# Patient Record
Sex: MALE | Race: WHITE | NOT HISPANIC OR LATINO | Employment: OTHER | URBAN - METROPOLITAN AREA
[De-identification: names, ages, dates, MRNs, and addresses within clinical notes are randomized per-mention and may not be internally consistent; named-entity substitution may affect disease eponyms.]

---

## 2018-01-01 ENCOUNTER — APPOINTMENT (OUTPATIENT)
Dept: GENERAL RADIOLOGY | Facility: HOSPITAL | Age: 63
End: 2018-01-01

## 2018-01-01 ENCOUNTER — HOSPITAL ENCOUNTER (INPATIENT)
Facility: HOSPITAL | Age: 63
LOS: 3 days | Discharge: HOME | End: 2018-01-04
Attending: EMERGENCY MEDICINE | Admitting: HOSPITALIST

## 2018-01-01 ENCOUNTER — APPOINTMENT (OUTPATIENT)
Dept: CT IMAGING | Facility: HOSPITAL | Age: 63
End: 2018-01-01

## 2018-01-01 DIAGNOSIS — S06.5XAA SUBDURAL HEMATOMA (HCC): ICD-10-CM

## 2018-01-01 DIAGNOSIS — S06.0X1A CONCUSSION WITH LOSS OF CONSCIOUSNESS OF 30 MINUTES OR LESS, INITIAL ENCOUNTER: ICD-10-CM
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
## 2018-01-01 DIAGNOSIS — S02.119A FRACTURE OF OCCIPITAL BONE OF SKULL WITH LOSS OF CONSCIOUSNESS (HCC): ICD-10-CM

## 2018-01-01 DIAGNOSIS — S06.9X9A FRACTURE OF OCCIPITAL BONE OF SKULL WITH LOSS OF CONSCIOUSNESS (HCC): ICD-10-CM

## 2018-01-01 DIAGNOSIS — S06.6X1A TRAUMATIC SUBARACHNOID HEMORRHAGE WITH LOSS OF CONSCIOUSNESS OF 30 MINUTES OR LESS, INITIAL ENCOUNTER (HCC): Primary | ICD-10-CM

## 2018-01-01 DIAGNOSIS — Z74.09 IMPAIRED FUNCTIONAL MOBILITY, BALANCE, GAIT, AND ENDURANCE: ICD-10-CM

## 2018-01-01 PROBLEM — I60.9 SAH (SUBARACHNOID HEMORRHAGE): Status: ACTIVE | Noted: 2018-01-01

## 2018-01-01 PROBLEM — S09.8XXA BLUNT HEAD TRAUMA: Status: ACTIVE | Noted: 2018-01-01
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
## 2018-01-01 LAB
ALBUMIN SERPL-MCNC: 3.9 G/DL (ref 3.2–4.8)
ALBUMIN/GLOB SERPL: 1.3 G/DL (ref 1.5–2.5)
ALP SERPL-CCNC: 69 U/L (ref 25–100)
ALT SERPL W P-5'-P-CCNC: 47 U/L (ref 7–40)
ANION GAP SERPL CALCULATED.3IONS-SCNC: 9 MMOL/L (ref 3–11)
APTT PPP: <24 SECONDS (ref 24–31)
AST SERPL-CCNC: 43 U/L (ref 0–33)
BACTERIA UR QL AUTO: ABNORMAL /HPF
BASOPHILS # BLD AUTO: 0.05 10*3/MM3 (ref 0–0.2)
BASOPHILS NFR BLD AUTO: 0.2 % (ref 0–1)
BILIRUB SERPL-MCNC: 0.5 MG/DL (ref 0.3–1.2)
BILIRUB UR QL STRIP: NEGATIVE
BUN BLD-MCNC: 12 MG/DL (ref 9–23)
BUN/CREAT SERPL: 12 (ref 7–25)
CALCIUM SPEC-SCNC: 9.2 MG/DL (ref 8.7–10.4)
CHLORIDE SERPL-SCNC: 106 MMOL/L (ref 99–109)
CLARITY UR: CLEAR
CO2 SERPL-SCNC: 25 MMOL/L (ref 20–31)
COLOR UR: YELLOW
CREAT BLD-MCNC: 1 MG/DL (ref 0.6–1.3)
DEPRECATED RDW RBC AUTO: 43.4 FL (ref 37–54)
EOSINOPHIL # BLD AUTO: 0.09 10*3/MM3 (ref 0–0.3)
EOSINOPHIL NFR BLD AUTO: 0.3 % (ref 0–3)
ERYTHROCYTE [DISTWIDTH] IN BLOOD BY AUTOMATED COUNT: 12.3 % (ref 11.3–14.5)
GFR SERPL CREATININE-BSD FRML MDRD: 75 ML/MIN/1.73
GLOBULIN UR ELPH-MCNC: 3 GM/DL
GLUCOSE BLD-MCNC: 161 MG/DL (ref 70–100)
GLUCOSE BLDC GLUCOMTR-MCNC: 192 MG/DL (ref 70–130)
GLUCOSE UR STRIP-MCNC: ABNORMAL MG/DL
HBA1C MFR BLD: 5.3 % (ref 4.8–5.6)
HCT VFR BLD AUTO: 43.5 % (ref 38.9–50.9)
HGB BLD-MCNC: 15.1 G/DL (ref 13.1–17.5)
HGB UR QL STRIP.AUTO: NEGATIVE
HYALINE CASTS UR QL AUTO: ABNORMAL /LPF
IMM GRANULOCYTES # BLD: 0.15 10*3/MM3 (ref 0–0.03)
IMM GRANULOCYTES NFR BLD: 0.6 % (ref 0–0.6)
INR PPP: 1.02
KETONES UR QL STRIP: ABNORMAL
LEUKOCYTE ESTERASE UR QL STRIP.AUTO: NEGATIVE
LYMPHOCYTES # BLD AUTO: 1.3 10*3/MM3 (ref 0.6–4.8)
LYMPHOCYTES NFR BLD AUTO: 4.8 % (ref 24–44)
MCH RBC QN AUTO: 33.5 PG (ref 27–31)
MCHC RBC AUTO-ENTMCNC: 34.7 G/DL (ref 32–36)
MCV RBC AUTO: 96.5 FL (ref 80–99)
MONOCYTES # BLD AUTO: 2.46 10*3/MM3 (ref 0–1)
MONOCYTES NFR BLD AUTO: 9.1 % (ref 0–12)
NEUTROPHILS # BLD AUTO: 22.89 10*3/MM3 (ref 1.5–8.3)
NEUTROPHILS NFR BLD AUTO: 85 % (ref 41–71)
NITRITE UR QL STRIP: NEGATIVE
PH UR STRIP.AUTO: 8.5 [PH] (ref 5–8)
PLATELET # BLD AUTO: 193 10*3/MM3 (ref 150–450)
PMV BLD AUTO: 9.3 FL (ref 6–12)
POTASSIUM BLD-SCNC: 3.6 MMOL/L (ref 3.5–5.5)
PROT SERPL-MCNC: 6.9 G/DL (ref 5.7–8.2)
PROT UR QL STRIP: ABNORMAL
PROTHROMBIN TIME: 11.1 SECONDS (ref 9.6–11.5)
RBC # BLD AUTO: 4.51 10*6/MM3 (ref 4.2–5.76)
RBC # UR: ABNORMAL /HPF
REF LAB TEST METHOD: ABNORMAL
SODIUM BLD-SCNC: 140 MMOL/L (ref 132–146)
SP GR UR STRIP: 1.02 (ref 1–1.03)
SQUAMOUS #/AREA URNS HPF: ABNORMAL /HPF
UROBILINOGEN UR QL STRIP: ABNORMAL
WBC NRBC COR # BLD: 26.94 10*3/MM3 (ref 3.5–10.8)
WBC UR QL AUTO: ABNORMAL /HPF

## 2018-01-01 PROCEDURE — 25010000002 ONDANSETRON PER 1 MG: Performed by: FAMILY MEDICINE

## 2018-01-01 PROCEDURE — 25010000002 DEXAMETHASONE SOD PHOS-NACL 10-0.9 MG/50ML-% SOLUTION: Performed by: NEUROLOGICAL SURGERY

## 2018-01-01 PROCEDURE — 85730 THROMBOPLASTIN TIME PARTIAL: CPT | Performed by: PHYSICIAN ASSISTANT

## 2018-01-01 PROCEDURE — 85610 PROTHROMBIN TIME: CPT | Performed by: PHYSICIAN ASSISTANT

## 2018-01-01 PROCEDURE — 63710000001 INSULIN REGULAR HUMAN PER 5 UNITS: Performed by: INTERNAL MEDICINE

## 2018-01-01 PROCEDURE — 25010000002 PROMETHAZINE PER 50 MG: Performed by: NEUROLOGICAL SURGERY

## 2018-01-01 PROCEDURE — 83036 HEMOGLOBIN GLYCOSYLATED A1C: CPT | Performed by: INTERNAL MEDICINE

## 2018-01-01 PROCEDURE — 25010000002 ONDANSETRON PER 1 MG: Performed by: EMERGENCY MEDICINE

## 2018-01-01 PROCEDURE — G0378 HOSPITAL OBSERVATION PER HR: HCPCS

## 2018-01-01 PROCEDURE — 82962 GLUCOSE BLOOD TEST: CPT

## 2018-01-01 PROCEDURE — 80053 COMPREHEN METABOLIC PANEL: CPT | Performed by: PHYSICIAN ASSISTANT

## 2018-01-01 PROCEDURE — 25810000003 SODIUM CHLORIDE 0.9 % WITH KCL 20 MEQ 20-0.9 MEQ/L-% SOLUTION: Performed by: INTERNAL MEDICINE

## 2018-01-01 PROCEDURE — 71045 X-RAY EXAM CHEST 1 VIEW: CPT

## 2018-01-01 PROCEDURE — 81001 URINALYSIS AUTO W/SCOPE: CPT | Performed by: NEUROLOGICAL SURGERY

## 2018-01-01 PROCEDURE — 99284 EMERGENCY DEPT VISIT MOD MDM: CPT

## 2018-01-01 PROCEDURE — 72125 CT NECK SPINE W/O DYE: CPT

## 2018-01-01 PROCEDURE — 85025 COMPLETE CBC W/AUTO DIFF WBC: CPT | Performed by: PHYSICIAN ASSISTANT

## 2018-01-01 PROCEDURE — 25010000002 DEXAMETHASONE PER 1 MG: Performed by: EMERGENCY MEDICINE

## 2018-01-01 PROCEDURE — 70450 CT HEAD/BRAIN W/O DYE: CPT

## 2018-01-01 PROCEDURE — 25010000002 MORPHINE SULFATE (PF) 2 MG/ML SOLUTION: Performed by: FAMILY MEDICINE

## 2018-01-01 RX ORDER — DEXAMETHASONE IN 0.9 % SOD CHL 10 MG/50ML
10 INTRAVENOUS SOLUTION, PIGGYBACK (ML) INTRAVENOUS ONCE
Status: COMPLETED | OUTPATIENT
Start: 2018-01-01 | End: 2018-01-01

## 2018-01-01 RX ORDER — SODIUM CHLORIDE 0.9 % (FLUSH) 0.9 %
10 SYRINGE (ML) INJECTION AS NEEDED
Status: DISCONTINUED | OUTPATIENT
Start: 2018-01-01 | End: 2018-01-04 | Stop reason: HOSPADM

## 2018-01-01 RX ORDER — NALOXONE HCL 0.4 MG/ML
0.4 VIAL (ML) INJECTION
Status: DISCONTINUED | OUTPATIENT
Start: 2018-01-01 | End: 2018-01-02

## 2018-01-01 RX ORDER — VALSARTAN 160 MG/1
160 TABLET ORAL DAILY
COMMUNITY
End: 2018-02-26

## 2018-01-01 RX ORDER — ACETAMINOPHEN 325 MG/1
650 TABLET ORAL EVERY 4 HOURS PRN
Status: DISCONTINUED | OUTPATIENT
Start: 2018-01-01 | End: 2018-01-04 | Stop reason: HOSPADM

## 2018-01-01 RX ORDER — MAGNESIUM SULFATE HEPTAHYDRATE 40 MG/ML
4 INJECTION, SOLUTION INTRAVENOUS AS NEEDED
Status: DISCONTINUED | OUTPATIENT
Start: 2018-01-01 | End: 2018-01-04 | Stop reason: HOSPADM

## 2018-01-01 RX ORDER — PANTOPRAZOLE SODIUM 40 MG/10ML
40 INJECTION, POWDER, LYOPHILIZED, FOR SOLUTION INTRAVENOUS
Status: DISCONTINUED | OUTPATIENT
Start: 2018-01-02 | End: 2018-01-02

## 2018-01-01 RX ORDER — ENALAPRILAT 2.5 MG/2ML
0.62 INJECTION INTRAVENOUS EVERY 6 HOURS PRN
Status: DISCONTINUED | OUTPATIENT
Start: 2018-01-01 | End: 2018-01-04 | Stop reason: HOSPADM

## 2018-01-01 RX ORDER — ONDANSETRON 2 MG/ML
4 INJECTION INTRAMUSCULAR; INTRAVENOUS EVERY 6 HOURS PRN
Status: DISCONTINUED | OUTPATIENT
Start: 2018-01-01 | End: 2018-01-04 | Stop reason: HOSPADM

## 2018-01-01 RX ORDER — MAGNESIUM SULFATE HEPTAHYDRATE 40 MG/ML
2 INJECTION, SOLUTION INTRAVENOUS AS NEEDED
Status: DISCONTINUED | OUTPATIENT
Start: 2018-01-01 | End: 2018-01-04 | Stop reason: HOSPADM

## 2018-01-01 RX ORDER — SODIUM CHLORIDE 450 MG/100ML
100 INJECTION, SOLUTION INTRAVENOUS CONTINUOUS
Status: DISCONTINUED | OUTPATIENT
Start: 2018-01-01 | End: 2018-01-01

## 2018-01-01 RX ORDER — MORPHINE SULFATE 2 MG/ML
1 INJECTION, SOLUTION INTRAMUSCULAR; INTRAVENOUS EVERY 4 HOURS PRN
Status: DISCONTINUED | OUTPATIENT
Start: 2018-01-01 | End: 2018-01-02

## 2018-01-01 RX ORDER — ACETAMINOPHEN 500 MG
1000 TABLET ORAL ONCE
Status: COMPLETED | OUTPATIENT
Start: 2018-01-01 | End: 2018-01-01

## 2018-01-01 RX ORDER — POTASSIUM CHLORIDE 7.45 MG/ML
10 INJECTION INTRAVENOUS
Status: DISCONTINUED | OUTPATIENT
Start: 2018-01-01 | End: 2018-01-04 | Stop reason: HOSPADM

## 2018-01-01 RX ORDER — ONDANSETRON 2 MG/ML
4 INJECTION INTRAMUSCULAR; INTRAVENOUS EVERY 6 HOURS PRN
Status: DISCONTINUED | OUTPATIENT
Start: 2018-01-01 | End: 2018-01-01 | Stop reason: SDUPTHER

## 2018-01-01 RX ORDER — DEXAMETHASONE SODIUM PHOSPHATE 4 MG/ML
4 INJECTION, SOLUTION INTRA-ARTICULAR; INTRALESIONAL; INTRAMUSCULAR; INTRAVENOUS; SOFT TISSUE EVERY 6 HOURS
Status: DISCONTINUED | OUTPATIENT
Start: 2018-01-01 | End: 2018-01-01 | Stop reason: SDUPTHER

## 2018-01-01 RX ORDER — SODIUM CHLORIDE 0.9 % (FLUSH) 0.9 %
1-10 SYRINGE (ML) INJECTION AS NEEDED
Status: DISCONTINUED | OUTPATIENT
Start: 2018-01-01 | End: 2018-01-04 | Stop reason: HOSPADM

## 2018-01-01 RX ORDER — SODIUM CHLORIDE AND POTASSIUM CHLORIDE 150; 900 MG/100ML; MG/100ML
75 INJECTION, SOLUTION INTRAVENOUS CONTINUOUS
Status: DISCONTINUED | OUTPATIENT
Start: 2018-01-01 | End: 2018-01-04 | Stop reason: HOSPADM

## 2018-01-01 RX ORDER — PANTOPRAZOLE SODIUM 40 MG/10ML
40 INJECTION, POWDER, LYOPHILIZED, FOR SOLUTION INTRAVENOUS EVERY 12 HOURS SCHEDULED
Status: DISCONTINUED | OUTPATIENT
Start: 2018-01-01 | End: 2018-01-01

## 2018-01-01 RX ORDER — NICOTINE POLACRILEX 4 MG
15 LOZENGE BUCCAL
Status: DISCONTINUED | OUTPATIENT
Start: 2018-01-01 | End: 2018-01-04 | Stop reason: HOSPADM

## 2018-01-01 RX ORDER — DEXTROSE MONOHYDRATE 25 G/50ML
25 INJECTION, SOLUTION INTRAVENOUS
Status: DISCONTINUED | OUTPATIENT
Start: 2018-01-01 | End: 2018-01-04 | Stop reason: HOSPADM

## 2018-01-01 RX ORDER — VALSARTAN 160 MG/1
160 TABLET ORAL
Status: DISCONTINUED | OUTPATIENT
Start: 2018-01-01 | End: 2018-01-04 | Stop reason: HOSPADM

## 2018-01-01 RX ORDER — ONDANSETRON 2 MG/ML
4 INJECTION INTRAMUSCULAR; INTRAVENOUS ONCE
Status: COMPLETED | OUTPATIENT
Start: 2018-01-01 | End: 2018-01-01

## 2018-01-01 RX ORDER — HYDROCODONE BITARTRATE AND ACETAMINOPHEN 5; 325 MG/1; MG/1
2 TABLET ORAL EVERY 6 HOURS PRN
Status: DISCONTINUED | OUTPATIENT
Start: 2018-01-01 | End: 2018-01-02

## 2018-01-01 RX ORDER — DEXAMETHASONE SODIUM PHOSPHATE 4 MG/ML
4 INJECTION, SOLUTION INTRA-ARTICULAR; INTRALESIONAL; INTRAMUSCULAR; INTRAVENOUS; SOFT TISSUE EVERY 6 HOURS
Status: DISCONTINUED | OUTPATIENT
Start: 2018-01-01 | End: 2018-01-04 | Stop reason: HOSPADM

## 2018-01-01 RX ORDER — ACETAMINOPHEN 500 MG
1000 TABLET ORAL 3 TIMES DAILY
Status: DISCONTINUED | OUTPATIENT
Start: 2018-01-01 | End: 2018-01-04 | Stop reason: HOSPADM

## 2018-01-01 RX ORDER — OXYCODONE HYDROCHLORIDE AND ACETAMINOPHEN 5; 325 MG/1; MG/1
1 TABLET ORAL EVERY 4 HOURS PRN
Status: DISCONTINUED | OUTPATIENT
Start: 2018-01-01 | End: 2018-01-02

## 2018-01-01 RX ORDER — PROMETHAZINE HYDROCHLORIDE 25 MG/ML
12.5 INJECTION, SOLUTION INTRAMUSCULAR; INTRAVENOUS EVERY 4 HOURS PRN
Status: DISCONTINUED | OUTPATIENT
Start: 2018-01-01 | End: 2018-01-04 | Stop reason: HOSPADM

## 2018-01-01 RX ADMIN — HYDROCODONE BITARTRATE AND ACETAMINOPHEN 2 TABLET: 5; 325 TABLET ORAL at 14:52

## 2018-01-01 RX ADMIN — NICARDIPINE HYDROCHLORIDE 2.5 MG/HR: 25 INJECTION INTRAVENOUS at 21:41

## 2018-01-01 RX ADMIN — POTASSIUM CHLORIDE AND SODIUM CHLORIDE 75 ML/HR: 900; 150 INJECTION, SOLUTION INTRAVENOUS at 16:18

## 2018-01-01 RX ADMIN — OXYCODONE AND ACETAMINOPHEN 1 TABLET: 5; 325 TABLET ORAL at 20:26

## 2018-01-01 RX ADMIN — INSULIN HUMAN 2 UNITS: 100 INJECTION, SOLUTION PARENTERAL at 23:50

## 2018-01-01 RX ADMIN — DEXAMETHASONE SODIUM PHOSPHATE 4 MG: 4 INJECTION, SOLUTION INTRAMUSCULAR; INTRAVENOUS at 20:26

## 2018-01-01 RX ADMIN — ONDANSETRON 4 MG: 2 INJECTION INTRAMUSCULAR; INTRAVENOUS at 13:08

## 2018-01-01 RX ADMIN — Medication 10 MG: at 15:18

## 2018-01-01 RX ADMIN — ACETAMINOPHEN 1000 MG: 500 TABLET ORAL at 20:26

## 2018-01-01 RX ADMIN — HYDROCODONE BITARTRATE AND ACETAMINOPHEN 2 TABLET: 5; 325 TABLET ORAL at 23:50

## 2018-01-01 RX ADMIN — ACETAMINOPHEN 1000 MG: 500 TABLET ORAL at 12:50

## 2018-01-01 RX ADMIN — INSULIN HUMAN 2 UNITS: 100 INJECTION, SOLUTION PARENTERAL at 15:18

## 2018-01-01 RX ADMIN — PROMETHAZINE HYDROCHLORIDE 12.5 MG: 25 INJECTION INTRAMUSCULAR; INTRAVENOUS at 23:55

## 2018-01-01 RX ADMIN — MORPHINE SULFATE 1 MG: 25 INJECTION, SOLUTION, CONCENTRATE INTRAVENOUS at 16:25

## 2018-01-01 RX ADMIN — MORPHINE SULFATE 1 MG: 25 INJECTION, SOLUTION, CONCENTRATE INTRAVENOUS at 21:44

## 2018-01-01 NOTE — PLAN OF CARE
Problem: Pain, Acute (Adult)  Goal: Identify Related Risk Factors and Signs and Symptoms  Outcome: Ongoing (interventions implemented as appropriate)    Goal: Acceptable Pain Control/Comfort Level  Outcome: Ongoing (interventions implemented as appropriate)

## 2018-01-01 NOTE — H&P
"[unfilled]    Patient Care Team:  No Known Provider as PCP - General  No Known Provider as PCP - Family Medicine  [phone]      Chief complaint:  Headache    History of present illness:    This is a 62-year-old male who presented to the emergency department after falling and striking his occiput with the onset of headache, confusion, short-term memory loss and nausea and vomiting.    CT scan was performed and shows the presence of a fracture at the level of the occipital protuberance associated with traumatic subarachnoid hemorrhage it both frontal poles.  There is a small parafalcine subdural hematoma on the left extension along the frontal pole.  No other abnormalities are noted.      Past Medical History:   Diagnosis Date   • GI bleed    • Hypertension    • Ulcer        No Known Allergies      Current Facility-Administered Medications:   •  Insert peripheral IV, , , Once **AND** sodium chloride 0.9 % flush 10 mL, 10 mL, Intravenous, PRN, SUMA Lofton    Current Outpatient Prescriptions:   •  valsartan (DIOVAN) 160 MG tablet, Take 160 mg by mouth Daily., Disp: , Rfl:     Social History     Social History   • Marital status:      Spouse name: N/A   • Number of children: N/A   • Years of education: N/A     Social History Main Topics   • Smoking status: Never Smoker   • Smokeless tobacco: None   • Alcohol use No   • Drug use: No   • Sexual activity: Not Asked     Other Topics Concern   • None     Social History Narrative   • None       History reviewed. No pertinent family history.    Review of Systems  Neurological ROS: negative    Vital Signs  Temp:  [97.5 °F (36.4 °C)] 97.5 °F (36.4 °C)  Heart Rate:  [] 102  Resp:  [16-18] 16  BP: (138-162)/() 153/98  Body mass index is 27.32 kg/(m^2).  No intake or output data in the 24 hours ending 01/01/18 1324  Flowsheet Rows         First Filed Value    Admission Height  175.3 cm (69\") Documented at 01/01/2018 1054    Admission Weight  83.9 kg " (185 lb) Documented at 01/01/2018 1054           Physical Exam:    General Appearance:    Alert, cooperative, in no acute distress   Head:    Has ecchymosis around his left periorbital region.  Patient's scalp incision posteriorly.  Normocephalic, without obvious abnormality, atraumatic   Eyes:            Lids and lashes normal, conjunctivae and sclerae normal, no   icterus, no pallor, corneas clear, PERRLA   Ears:    Ears appear intact with no abnormalities noted   Throat:   No oral lesions, no thrush, oral mucosa moist   Neck:   No adenopathy, supple, trachea midline, no thyromegaly, no   carotid bruit, no JVD   Back:     No kyphosis present, no scoliosis present, no skin lesions,      erythema or scars, no tenderness to percussion or                   palpation,   range of motion normal   Lungs:     Clear to auscultation,respirations regular, even and                  unlabored    Heart:    Regular rhythm and normal rate, normal S1 and S2, no            murmur, no gallop, no rub, no click   Chest Wall:    No abnormalities observed   Abdomen:     Normal bowel sounds, no masses, no organomegaly, soft        non-tender, non-distended, no guarding, no rebound                tenderness   Rectal:     Deferred   Extremities:   Moves all extremities well, no edema, no cyanosis, no             redness   Pulses:   Pulses palpable and equal bilaterally   Skin:   No bleeding, bruising or rash   Lymph nodes:   No palpable adenopathy   Neurologic:   Cranial nerves 2 - 12 grossly intact, sensation intact, DTR       present and equal bilaterally       Results Review:  WBC   Date Value Ref Range Status   01/01/2018 26.94 (H) 3.50 - 10.80 10*3/mm3 Final     RBC   Date Value Ref Range Status   01/01/2018 4.51 4.20 - 5.76 10*6/mm3 Final     Hemoglobin   Date Value Ref Range Status   01/01/2018 15.1 13.1 - 17.5 g/dL Final     Hematocrit   Date Value Ref Range Status   01/01/2018 43.5 38.9 - 50.9 % Final     MCV   Date Value Ref Range  Status   01/01/2018 96.5 80.0 - 99.0 fL Final     MCH   Date Value Ref Range Status   01/01/2018 33.5 (H) 27.0 - 31.0 pg Final     MCHC   Date Value Ref Range Status   01/01/2018 34.7 32.0 - 36.0 g/dL Final     RDW   Date Value Ref Range Status   01/01/2018 12.3 11.3 - 14.5 % Final     RDW-SD   Date Value Ref Range Status   01/01/2018 43.4 37.0 - 54.0 fl Final     MPV   Date Value Ref Range Status   01/01/2018 9.3 6.0 - 12.0 fL Final     Platelets   Date Value Ref Range Status   01/01/2018 193 150 - 450 10*3/mm3 Final     Neutrophil %   Date Value Ref Range Status   01/01/2018 85.0 (H) 41.0 - 71.0 % Final     Lymphocyte %   Date Value Ref Range Status   01/01/2018 4.8 (L) 24.0 - 44.0 % Final     Monocyte %   Date Value Ref Range Status   01/01/2018 9.1 0.0 - 12.0 % Final     Eosinophil %   Date Value Ref Range Status   01/01/2018 0.3 0.0 - 3.0 % Final     Basophil %   Date Value Ref Range Status   01/01/2018 0.2 0.0 - 1.0 % Final     Immature Grans %   Date Value Ref Range Status   01/01/2018 0.6 0.0 - 0.6 % Final     Neutrophils, Absolute   Date Value Ref Range Status   01/01/2018 22.89 (H) 1.50 - 8.30 10*3/mm3 Final     Lymphocytes, Absolute   Date Value Ref Range Status   01/01/2018 1.30 0.60 - 4.80 10*3/mm3 Final     Monocytes, Absolute   Date Value Ref Range Status   01/01/2018 2.46 (H) 0.00 - 1.00 10*3/mm3 Final     Eosinophils, Absolute   Date Value Ref Range Status   01/01/2018 0.09 0.00 - 0.30 10*3/mm3 Final     Basophils, Absolute   Date Value Ref Range Status   01/01/2018 0.05 0.00 - 0.20 10*3/mm3 Final     Immature Grans, Absolute   Date Value Ref Range Status   01/01/2018 0.15 (H) 0.00 - 0.03 10*3/mm3 Final         Patient Active Problem List   Diagnosis Code   • Closed fracture of occipital bone S02.119A   • SDH (subdural hematoma) I62.00   • SAH (subarachnoid hemorrhage) I60.9   • Fall due to ice or snow W00.9XXA   • Blunt head trauma due to fall S09.8XXA   • Traumatic subarachnoid hemorrhage with  loss of consciousness of 30 minutes or less S06.6X1A         Assessment/Plan   1.  Closed head injury   2.  Traumatic subarachnoid hemorrhage.  3.  Subdural hematoma, parafalcine and left frontal pole      He requires intensive care observation over the next 24-46 6 hours.  The concern will be development of cerebral edema and swelling secondary to the traumatic event.          , 01/01/18         1:24 PM

## 2018-01-01 NOTE — ED PROVIDER NOTES
Subjective   HPI Comments: 62-year-old male presents to the emergency department for evaluation after a fall.  The patient reportedly was walking his dog and slipped on ice and fell and hit the back of his head on concrete.  There was positive loss of consciousness.  The neighbors found him confused on the side of the road.  The patient has no recall of the fall or other morning events.  He continually ask what happened.  He is not on any blood thinners.  He has had some mild discomfort in the upper neck.  Past medical history of hypertension.  He is a nonsmoker.  No alcohol use.  He has no current PCP.    Patient is a 62 y.o. male presenting with fall.   History provided by:  Patient  Fall   Mechanism of injury: fall    Injury location:  Head/neck  Head/neck injury location:  Head  Incident location:  Street  Time since incident: just prior to arrival.  Arrived directly from scene: yes    Fall:     Fall occurred: slipped on ice.    Height of fall:  Standing height    Impact surface:  Trosper    Point of impact:  Head  Prior to arrival data:     Orientation at scene: confused.    Loss of consciousness: yes      Amnesic to event: yes    Associated symptoms: headaches, loss of consciousness and neck pain    Associated symptoms: no abdominal pain, no back pain, no chest pain, no difficulty breathing, no nausea, no seizures and no vomiting        Review of Systems   Constitutional: Negative for chills and fever.   HENT: Negative for congestion, ear pain, nosebleeds, rhinorrhea and sore throat.    Eyes: Negative for pain, discharge and visual disturbance.   Respiratory: Negative for shortness of breath and wheezing.    Cardiovascular: Negative for chest pain, palpitations and leg swelling.   Gastrointestinal: Negative for abdominal pain, blood in stool, diarrhea, nausea and vomiting.   Endocrine: Negative.    Genitourinary: Negative for dysuria, hematuria and urgency.   Musculoskeletal: Positive for neck pain. Negative  for arthralgias and back pain.   Skin: Negative for pallor and rash.   Allergic/Immunologic: Negative for immunocompromised state.   Neurological: Positive for loss of consciousness and headaches. Negative for dizziness, seizures, speech difficulty and weakness.   Hematological: Negative for adenopathy. Does not bruise/bleed easily.   Psychiatric/Behavioral: Positive for confusion.       Past Medical History:   Diagnosis Date   • GI bleed    • Hypertension    • Ulcer        No Known Allergies    Past Surgical History:   Procedure Laterality Date   • COLONOSCOPY         History reviewed. No pertinent family history.    Social History     Social History   • Marital status:      Spouse name: N/A   • Number of children: N/A   • Years of education: N/A     Social History Main Topics   • Smoking status: Never Smoker   • Smokeless tobacco: None   • Alcohol use No   • Drug use: No   • Sexual activity: Not Asked     Other Topics Concern   • None     Social History Narrative   • None           Objective   Physical Exam   Constitutional: He appears well-developed and well-nourished. No distress.   HENT:   Right Ear: External ear normal.   Left Ear: External ear normal.   Nose: Nose normal.   Mouth/Throat: Oropharynx is clear and moist.   Small burst laceration to occipital scalp;  No sutures needed.     Eyes: EOM are normal. Pupils are equal, round, and reactive to light. Left eye exhibits no discharge. No scleral icterus.   Neck: Normal range of motion. Neck supple.   Cardiovascular: Normal rate, regular rhythm, normal heart sounds and intact distal pulses.    No murmur heard.  Pulmonary/Chest: Effort normal and breath sounds normal. No respiratory distress. He has no wheezes. He has no rales. He exhibits no tenderness.   Abdominal: Soft. Bowel sounds are normal. There is no tenderness.   Musculoskeletal: Normal range of motion. He exhibits tenderness (mild paravertebral tenderness). He exhibits no edema.    Neurological: He is alert.   Pt has no recall of the fall or this morning's events.  Otherwise, no neuro deficits.     Skin: Skin is warm and dry. He is not diaphoretic.   Psychiatric: He has a normal mood and affect.   Nursing note and vitals reviewed.      Critical Care  Performed by: GONZALO LONG  Authorized by: JENNIFER SOUZA     Critical care provider statement:     Critical care time (minutes):  45    Critical care time was exclusive of:  Separately billable procedures and treating other patients    Critical care was necessary to treat or prevent imminent or life-threatening deterioration of the following conditions: fall with occipital skull fracture and coup-contracoup anterior subdural and subarachnoid hemorrhage.    Critical care was time spent personally by me on the following activities:  Discussions with consultants, development of treatment plan with patient or surrogate, examination of patient, obtaining history from patient or surrogate, ordering and performing treatments and interventions, ordering and review of radiographic studies, pulse oximetry and re-evaluation of patient's condition      Pereyra-Soler score is 1.         ED Course  ED Course    12:48 PM  CT shows non-displaced occipital skull fracture with coup-contracoup anterior subarachnoid and acute subdural hematomas in the bifrontal convexities and parafalcine region.  I spoke with Dr. Bautista who advised to admit to hospitalist and he would see him.      1:18 PM  Per the RN, I was advised that Dr. Bautista was going to admit the pt to ICU instead of a floor bed.  Dr. Wu notified.      No results found for this or any previous visit (from the past 24 hour(s)).  Note: In addition to lab results from this visit, the labs listed above may include labs taken at another facility or during a different encounter within the last 24 hours. Please correlate lab times with ED admission and discharge times for further clarification of the  "services performed during this visit.    CT Cervical Spine Without Contrast   Preliminary Result   1. Partially imaged occipital bone fracture extending to the posterior   margin of the foramen magnum without displacement.   2. No evidence for acute fracture or subluxation of the cervical spine   otherwise identified.       D:  01/01/2018   E:  01/01/2018          CT Head Without Contrast   Preliminary Result   1. Nondisplaced nondepressed fracture of the occipital bone midline   extending to the level of the foramen magnum posterior margin.   2. Coup-Contrecoup type injury with questionable minimal   intraparenchymal contusion at the midline cerebellar region confluence   of sinuses and more significant anterior opposite contrecoup   subarachnoid and acute subdural hematomas noted anteriorly bifrontal   convexities and in parafalcine location.   3. Ventricles and sulci within the anterior and middle cranial fossae   appear somewhat decreased in overall appearance although may be due to   significant subarachnoid hemorrhage although raises suspicion for early   cerebral edema pattern in the setting of acute trauma.       D:  01/01/2018   E:  01/01/2018            Vitals:    01/01/18 1054 01/01/18 1200 01/01/18 1228 01/01/18 1230   BP: 138/89 162/100 144/87 141/88   BP Location: Right arm  Right arm    Patient Position: Lying  Lying    Pulse: 101 91 93 95   Resp: 18  16    Temp: 97.5 °F (36.4 °C)      TempSrc: Oral      SpO2: 98% 94% 97% 96%   Weight: 83.9 kg (185 lb)      Height: 175.3 cm (69\")        Medications   sodium chloride 0.9 % flush 10 mL (not administered)   acetaminophen (TYLENOL) tablet 1,000 mg (not administered)     ECG/EMG Results (last 24 hours)     ** No results found for the last 24 hours. **                    MDM    Final diagnoses:   Traumatic subarachnoid hemorrhage with loss of consciousness of 30 minutes or less, initial encounter   Fracture of occipital bone of skull with loss of " consciousness   Fall, initial encounter   Subdural hematoma   Concussion with loss of consciousness of 30 minutes or less, initial encounter            SUMA Lofton  01/01/18 1249       SUMA Lofton  01/01/18 1322

## 2018-01-01 NOTE — H&P
INTENSIVIST / PULMONARY INITIAL VISIT (CONSULT / H&P) NOTE     Hospital:  LOS: 0 days   Mr. Kt Davis, 62 y.o. male is followed for:   Chief Complaint   Patient presents with   • Fall     Principal Problem:    Closed fracture of occipital bone  Active Problems:    SDH (subdural hematoma)    SAH (subarachnoid hemorrhage)    Fall due to ice or snow    Blunt head trauma due to fall    Traumatic subarachnoid hemorrhage with loss of consciousness of 30 minutes or less         History of Present Illness   63 y/o WF w/ only PMH being HTN and remote UGIB a few years ago, who was out walking his dog this morning around 9:30 am and fell and hit his head (slipped on ice).  He was found by neighbors.  Patient did lose consciousness and was very confused, repeating himself etc.  He seems to be improving now.  CT showed a non-displaced occipital bone fracture, traumatic SAH, and SDH.  He was evaluated by Neurosurgery, Dr. Bautista and they have asked us to help with ICU care.  He will need to be monitored in ICU for cerebral edema.    Past Medical History:   Diagnosis Date   • GI bleed    • Hypertension    • Ulcer      Past Surgical History:   Procedure Laterality Date   • COLONOSCOPY       History reviewed. No pertinent family history.  Social History     Social History   • Marital status:      Spouse name: N/A   • Number of children: N/A   • Years of education: N/A     Occupational History   • Not on file.     Social History Main Topics   • Smoking status: Never Smoker   • Smokeless tobacco: Not on file   • Alcohol use No   • Drug use: No   • Sexual activity: Not on file     Other Topics Concern   • Not on file     Social History Narrative   • No narrative on file     No Known Allergies  No current facility-administered medications on file prior to encounter.      Current Outpatient Prescriptions on File Prior to Encounter   Medication Sig Dispense Refill   • [DISCONTINUED] azithromycin (ZITHROMAX) 250 MG tablet Take 2  tablets the first day, then 1 tablet daily for 4 days. 6 tablet 0   • [DISCONTINUED] MethylPREDNISolone (MEDROL, SERENA,) 4 MG tablet Take as directed on package instructions. 21 tablet 0   • [DISCONTINUED] promethazine-dextromethorphan (PROMETHAZINE-DM) 6.25-15 MG/5ML syrup Take 5 mL by mouth At Night As Needed for Cough. 120 mL 0       ROS:  Per HPI, all other systems were reviewed and were negative        OBJECTIVE     Vital Sign Min/Max for last 24 hours  Temp  Min: 97.5 °F (36.4 °C)  Max: 97.5 °F (36.4 °C)   BP  Min: 138/89  Max: 162/100   Pulse  Min: 89  Max: 102   Resp  Min: 16  Max: 18   SpO2  Min: 94 %  Max: 98 %   No Data Recorded     Telemetry:              General Appearance:  Conversant, in no acute distress  Eyes:  No scleral icterus or pallor, pupils normal  Ears, Nose, Mouth, Throat:  Atraumatic, oropharynx clear  Neck:  Trachea midline, thyroid normal  Respiratory:  Clear to auscultation bilaterally, normal effort, no tenderness to palpation  Cardiovascular:  Regular rate and rhythm, no murmurs, no peripheral edema, no thrill  Gastrointestinal:  Soft, non-tender, non-distended, no hepatosplenomegaly  Skin:  Normal temperature, no rash  Psychiatric:  Alert and oriented x 3, slightly confused  Neuro:  No new focal neurologic deficits observed    SpO2: 97 % SpO2  Min: 94 %  Max: 98 %   Device: room air    Flow Rate:   No Data Recorded     Mechanical Ventilator Settings:                                         Intake/Ouptut 24 hrs (7:00AM - 6:59 AM)  Intake & Output (last 3 days)     None          Lines/Drains/Airways:      Lines, Drains & Airways    Active LDAs     Name:   Placement date:   Placement time:   Site:   Days:    Peripheral IV Line - Single Lumen 01/01/18 1052  18 gauge;1 in length  01/01/18    1052      less than 1                Hematology:    Results from last 7 days  Lab Units 01/01/18  1308   WBC 10*3/mm3 26.94*   HEMOGLOBIN g/dL 15.1   HEMATOCRIT % 43.5   PLATELETS 10*3/mm3 193      Electrolytes, Magnesium and Phosphorus:    Results from last 7 days  Lab Units 01/01/18  1308   SODIUM mmol/L 140   POTASSIUM mmol/L 3.6   CO2 mmol/L 25.0     Renal:    Results from last 7 days  Lab Units 01/01/18  1308   CREATININE mg/dL 1.00   BUN mg/dL 12     Estimated Creatinine Clearance: 89.7 mL/min (by C-G formula based on Cr of 1).  Estimated Creatinine Clearance: 89.7 mL/min (by C-G formula based on Cr of 1).  Hepatic:    Results from last 7 days  Lab Units 01/01/18  1308   ALK PHOS U/L 69   BILIRUBIN mg/dL 0.5   ALT (SGPT) U/L 47*   AST (SGOT) U/L 43*     Arterial Blood Gases:              No results found for: LACTATE    Ct Head Without Contrast    Result Date: 1/1/2018  Narrative: EXAMINATION: CT HEAD WO CONTRAST-  INDICATION: Fall.  TECHNIQUE: Axial CT of the head without intravenous contrast administration.  The radiation dose reduction device was turned on for each scan per the ALARA (As Low as Reasonably Achievable) protocol.  COMPARISON: None.  FINDINGS: No midline shift is identified. Ventricles and sulci appear mildly decreased for expected of a patient of this age concerning for potential early cerebral edema pattern, however, basal cisterns are patent. Scattered subarachnoid hemorrhage within the bifrontal and bilateral temporal lobes along with hyperattenuation along the bifrontal convexities and parafalcine region anteriorly measuring up to 8 mm in maximum thickness consistent with acute subdural hematoma. Minimal high attenuation adjacent to the confluence of the sinuses along with heterogeneous signal attenuation right cerebellar hemisphere posteriorly concerning for subtle intraparenchymal contusion. Fourth ventricle and foramen magnum widely patent. Globes and orbits unremarkable. Visualized paranasal sinuses and mastoid air cells are grossly clear and well pneumatized. Irregular lucency extending through the occipital bone to the level of the foramen magnum concerning for nondisplaced  fracture in the setting of acute trauma without depressed fragment.      Impression: 1. Nondisplaced nondepressed fracture of the occipital bone midline extending to the level of the foramen magnum posterior margin. 2. Coup-Contrecoup type injury with questionable minimal intraparenchymal contusion at the midline cerebellar region confluence of sinuses and more significant anterior opposite contrecoup subarachnoid and acute subdural hematomas noted anteriorly bifrontal convexities and in parafalcine location. 3. Ventricles and sulci within the anterior and middle cranial fossae appear somewhat decreased in overall appearance although may be due to significant subarachnoid hemorrhage although raises suspicion for early cerebral edema pattern in the setting of acute trauma.  D:  01/01/2018 E:  01/01/2018      Ct Cervical Spine Without Contrast    Result Date: 1/1/2018  Narrative: EXAMINATION: CT CERVICAL SPINE WO CONTRAST-  INDICATION: Fall, AMS.  TECHNIQUE: CT cervical spine without intravenous contrast administration.  The radiation dose reduction device was turned on for each scan per the ALARA (As Low as Reasonably Achievable) protocol.  COMPARISON: None.  FINDINGS: There is flattening of the normal cervical lordosis which may represent muscle spasm and/or patient positioning without focal subluxation identified. Vertebral body heights are preserved with intervertebral disc height loss of the mid and lower cervical segments consistent with degenerative change. Facets are well aligned.  No evidence for discrete fracture is identified.  Lateral masses of C1 are well-seated on C2. Dens is normal in appearance. Partially imaged nondisplaced fracture posterior ring of the foramen magnum identified. Axial datasets evaluation without paraspinal hematoma or soft tissue abnormality of concern.      Impression: 1. Partially imaged occipital bone fracture extending to the posterior margin of the foramen magnum without  displacement. 2. No evidence for acute fracture or subluxation of the cervical spine otherwise identified.  D:  01/01/2018 E:  01/01/2018        Relevant imaging studies and labs from 01/01/18 were reviewed and interpreted by me    Medications (drips):    niCARdipine   sodium chloride         acetaminophen 1,000 mg Oral TID   dexamethasone 4 mg Intravenous Q6H   dexamethasone 10 mg Intravenous Once   pantoprazole 40 mg Intravenous Q12H   valsartan 160 mg Oral Q24H       Assessment/Plan   IMPRESSION / PLAN     Inpatient Problem List:  62 y.o.male:  Hospital Problem List     * (Principal)Closed fracture of occipital bone    SDH (subdural hematoma)    SAH (subarachnoid hemorrhage)    Fall due to ice or snow    Blunt head trauma due to fall    Traumatic subarachnoid hemorrhage with loss of consciousness of 30 minutes or less           Impression:  62 y.o.male with relevant PMH of HTN admitted 1/1/2018 w/ Closed Head Injury, Concussion, Traumatic SAH/SDH, non displaced occipital fracture after slipping on ice    Plan:  -Closed Head Injury / Traumatic SAH/SDH - per NS, monitor for cerebral edema    -CXR to screen for aspiration pneumonia    -SCDs / Protonix    -IVF    -NPO    Critical Care time spent in direct patient care: 30 minutes (excluding procedure time, if applicable) including high complexity decision making to assess, manipulate, and support vital organ system failure in this individual who has impairment of one or more vital organ systems such that there is a high probability of imminent or life threatening deterioration in the patient’s condition.       Tucker Yo MD  Intensive Care Medicine  01/01/18 2:01 PM

## 2018-01-02 ENCOUNTER — APPOINTMENT (OUTPATIENT)
Dept: CT IMAGING | Facility: HOSPITAL | Age: 63
End: 2018-01-02

## 2018-01-02 PROBLEM — I10 HYPERTENSION: Status: ACTIVE | Noted: 2018-01-02

## 2018-01-02 LAB
ALBUMIN SERPL-MCNC: 3.9 G/DL (ref 3.2–4.8)
ALBUMIN/GLOB SERPL: 1.3 G/DL (ref 1.5–2.5)
ALP SERPL-CCNC: 56 U/L (ref 25–100)
ALT SERPL W P-5'-P-CCNC: 39 U/L (ref 7–40)
ANION GAP SERPL CALCULATED.3IONS-SCNC: 9 MMOL/L (ref 3–11)
ANION GAP SERPL CALCULATED.3IONS-SCNC: 9 MMOL/L (ref 3–11)
AST SERPL-CCNC: 34 U/L (ref 0–33)
BASOPHILS # BLD AUTO: 0.01 10*3/MM3 (ref 0–0.2)
BASOPHILS NFR BLD AUTO: 0 % (ref 0–1)
BILIRUB SERPL-MCNC: 0.6 MG/DL (ref 0.3–1.2)
BUN BLD-MCNC: 14 MG/DL (ref 9–23)
BUN BLD-MCNC: 14 MG/DL (ref 9–23)
BUN/CREAT SERPL: 14 (ref 7–25)
BUN/CREAT SERPL: 14 (ref 7–25)
CALCIUM SPEC-SCNC: 9.3 MG/DL (ref 8.7–10.4)
CALCIUM SPEC-SCNC: 9.3 MG/DL (ref 8.7–10.4)
CHLORIDE SERPL-SCNC: 104 MMOL/L (ref 99–109)
CHLORIDE SERPL-SCNC: 104 MMOL/L (ref 99–109)
CO2 SERPL-SCNC: 24 MMOL/L (ref 20–31)
CO2 SERPL-SCNC: 24 MMOL/L (ref 20–31)
CREAT BLD-MCNC: 1 MG/DL (ref 0.6–1.3)
CREAT BLD-MCNC: 1 MG/DL (ref 0.6–1.3)
DEPRECATED RDW RBC AUTO: 44.5 FL (ref 37–54)
EOSINOPHIL # BLD AUTO: 0 10*3/MM3 (ref 0–0.3)
EOSINOPHIL NFR BLD AUTO: 0 % (ref 0–3)
ERYTHROCYTE [DISTWIDTH] IN BLOOD BY AUTOMATED COUNT: 12.5 % (ref 11.3–14.5)
GFR SERPL CREATININE-BSD FRML MDRD: 75 ML/MIN/1.73
GFR SERPL CREATININE-BSD FRML MDRD: 75 ML/MIN/1.73
GLOBULIN UR ELPH-MCNC: 2.9 GM/DL
GLUCOSE BLD-MCNC: 154 MG/DL (ref 70–100)
GLUCOSE BLD-MCNC: 154 MG/DL (ref 70–100)
GLUCOSE BLDC GLUCOMTR-MCNC: 126 MG/DL (ref 70–130)
GLUCOSE BLDC GLUCOMTR-MCNC: 142 MG/DL (ref 70–130)
GLUCOSE BLDC GLUCOMTR-MCNC: 144 MG/DL (ref 70–130)
GLUCOSE BLDC GLUCOMTR-MCNC: 148 MG/DL (ref 70–130)
HCT VFR BLD AUTO: 42.6 % (ref 38.9–50.9)
HGB BLD-MCNC: 14.6 G/DL (ref 13.1–17.5)
IMM GRANULOCYTES # BLD: 0.15 10*3/MM3 (ref 0–0.03)
IMM GRANULOCYTES NFR BLD: 0.5 % (ref 0–0.6)
LYMPHOCYTES # BLD AUTO: 0.64 10*3/MM3 (ref 0.6–4.8)
LYMPHOCYTES NFR BLD AUTO: 2.2 % (ref 24–44)
MAGNESIUM SERPL-MCNC: 1.9 MG/DL (ref 1.3–2.7)
MCH RBC QN AUTO: 33.3 PG (ref 27–31)
MCHC RBC AUTO-ENTMCNC: 34.3 G/DL (ref 32–36)
MCV RBC AUTO: 97.3 FL (ref 80–99)
MONOCYTES # BLD AUTO: 1.99 10*3/MM3 (ref 0–1)
MONOCYTES NFR BLD AUTO: 6.8 % (ref 0–12)
NEUTROPHILS # BLD AUTO: 26.46 10*3/MM3 (ref 1.5–8.3)
NEUTROPHILS NFR BLD AUTO: 90.5 % (ref 41–71)
PHOSPHATE SERPL-MCNC: 1.6 MG/DL (ref 2.4–5.1)
PLATELET # BLD AUTO: 167 10*3/MM3 (ref 150–450)
PMV BLD AUTO: 10.1 FL (ref 6–12)
POTASSIUM BLD-SCNC: 4.2 MMOL/L (ref 3.5–5.5)
POTASSIUM BLD-SCNC: 4.2 MMOL/L (ref 3.5–5.5)
PROT SERPL-MCNC: 6.8 G/DL (ref 5.7–8.2)
RBC # BLD AUTO: 4.38 10*6/MM3 (ref 4.2–5.76)
SODIUM BLD-SCNC: 137 MMOL/L (ref 132–146)
SODIUM BLD-SCNC: 137 MMOL/L (ref 132–146)
WBC NRBC COR # BLD: 29.25 10*3/MM3 (ref 3.5–10.8)

## 2018-01-02 PROCEDURE — 84100 ASSAY OF PHOSPHORUS: CPT | Performed by: INTERNAL MEDICINE

## 2018-01-02 PROCEDURE — 82962 GLUCOSE BLOOD TEST: CPT

## 2018-01-02 PROCEDURE — 80053 COMPREHEN METABOLIC PANEL: CPT | Performed by: INTERNAL MEDICINE

## 2018-01-02 PROCEDURE — 92523 SPEECH SOUND LANG COMPREHEN: CPT

## 2018-01-02 PROCEDURE — 25010000002 MORPHINE SULFATE (PF) 2 MG/ML SOLUTION: Performed by: FAMILY MEDICINE

## 2018-01-02 PROCEDURE — 99232 SBSQ HOSP IP/OBS MODERATE 35: CPT | Performed by: INTERNAL MEDICINE

## 2018-01-02 PROCEDURE — 25010000002 DEXAMETHASONE PER 1 MG: Performed by: EMERGENCY MEDICINE

## 2018-01-02 PROCEDURE — 80048 BASIC METABOLIC PNL TOTAL CA: CPT | Performed by: NEUROLOGICAL SURGERY

## 2018-01-02 PROCEDURE — 85025 COMPLETE CBC W/AUTO DIFF WBC: CPT | Performed by: NEUROLOGICAL SURGERY

## 2018-01-02 PROCEDURE — 25810000003 SODIUM CHLORIDE 0.9 % WITH KCL 20 MEQ 20-0.9 MEQ/L-% SOLUTION: Performed by: INTERNAL MEDICINE

## 2018-01-02 PROCEDURE — 83735 ASSAY OF MAGNESIUM: CPT | Performed by: INTERNAL MEDICINE

## 2018-01-02 PROCEDURE — 70450 CT HEAD/BRAIN W/O DYE: CPT

## 2018-01-02 RX ORDER — OXYCODONE HYDROCHLORIDE 5 MG/1
10 TABLET ORAL EVERY 4 HOURS PRN
Status: DISCONTINUED | OUTPATIENT
Start: 2018-01-02 | End: 2018-01-04 | Stop reason: HOSPADM

## 2018-01-02 RX ORDER — OXYCODONE HYDROCHLORIDE 5 MG/1
5 TABLET ORAL EVERY 4 HOURS PRN
Status: DISCONTINUED | OUTPATIENT
Start: 2018-01-02 | End: 2018-01-02

## 2018-01-02 RX ORDER — OXYCODONE AND ACETAMINOPHEN 10; 325 MG/1; MG/1
1 TABLET ORAL EVERY 4 HOURS PRN
Status: DISCONTINUED | OUTPATIENT
Start: 2018-01-02 | End: 2018-01-04 | Stop reason: HOSPADM

## 2018-01-02 RX ADMIN — MORPHINE SULFATE 1 MG: 25 INJECTION, SOLUTION, CONCENTRATE INTRAVENOUS at 02:06

## 2018-01-02 RX ADMIN — DEXAMETHASONE SODIUM PHOSPHATE 4 MG: 4 INJECTION, SOLUTION INTRAMUSCULAR; INTRAVENOUS at 13:39

## 2018-01-02 RX ADMIN — PANTOPRAZOLE SODIUM 40 MG: 40 INJECTION, POWDER, FOR SOLUTION INTRAVENOUS at 06:12

## 2018-01-02 RX ADMIN — DEXAMETHASONE SODIUM PHOSPHATE 4 MG: 4 INJECTION, SOLUTION INTRAMUSCULAR; INTRAVENOUS at 02:05

## 2018-01-02 RX ADMIN — DEXAMETHASONE SODIUM PHOSPHATE 4 MG: 4 INJECTION, SOLUTION INTRAMUSCULAR; INTRAVENOUS at 08:10

## 2018-01-02 RX ADMIN — OXYCODONE HYDROCHLORIDE 10 MG: 5 TABLET ORAL at 20:02

## 2018-01-02 RX ADMIN — MORPHINE SULFATE 1 MG: 25 INJECTION, SOLUTION, CONCENTRATE INTRAVENOUS at 06:16

## 2018-01-02 RX ADMIN — VALSARTAN 160 MG: 160 TABLET, FILM COATED ORAL at 08:10

## 2018-01-02 RX ADMIN — SODIUM PHOSPHATE, MONOBASIC, MONOHYDRATE 30 MMOL: 276; 142 INJECTION, SOLUTION INTRAVENOUS at 11:49

## 2018-01-02 RX ADMIN — ACETAMINOPHEN 1000 MG: 500 TABLET ORAL at 15:09

## 2018-01-02 RX ADMIN — OXYCODONE AND ACETAMINOPHEN 1 TABLET: 5; 325 TABLET ORAL at 04:01

## 2018-01-02 RX ADMIN — POTASSIUM CHLORIDE AND SODIUM CHLORIDE 75 ML/HR: 900; 150 INJECTION, SOLUTION INTRAVENOUS at 04:26

## 2018-01-02 RX ADMIN — DEXAMETHASONE SODIUM PHOSPHATE 4 MG: 4 INJECTION, SOLUTION INTRAMUSCULAR; INTRAVENOUS at 19:44

## 2018-01-02 RX ADMIN — MAGNESIUM SULFATE IN WATER 4 G: 40 INJECTION, SOLUTION INTRAVENOUS at 08:11

## 2018-01-02 NOTE — PLAN OF CARE
Problem: Patient Care Overview (Adult)  Goal: Plan of Care Review  Outcome: Ongoing (interventions implemented as appropriate)   01/02/18 1531   Coping/Psychosocial Response Interventions   Plan Of Care Reviewed With patient   Patient Care Overview   Progress (eval)   Outcome Evaluation   Outcome Summary/Follow up Plan Consult received for cognitive-communication eval due to concussion/confusion. Basic cognitive-communication skills appeared intact. Pt did demonstrate mild difficulty completing reading/writing tasks 2' intermittent diplopia. Provided edu re: how concussions can affect cognitive-communication and strategies to implement. SLP will f/u over next day or 2 as pt cont to recover to provide further edu/high-level assessment, PRN.

## 2018-01-02 NOTE — PROGRESS NOTES
Discharge Planning Assessment  [facility]     Patient Name: Kt Davis  MRN: [id number]  Today's Date: 1/2/2018    Admit Date: 1/1/2018          Discharge Needs Assessment       01/02/18 1100    Living Environment    Lives With spouse    Living Arrangements house    Home Accessibility bed and bath on same level;stairs to enter home    Number of Stairs to Enter Home 3    Stair Railings at Home none    Type of Financial/Environmental Concern --   Pt has [employer] insurance for medication.    Transportation Available car    Living Environment Comment Pt lives with [place] in [place]     Living Environment    Provides Primary Care For no one    Primary Care Provided By spouse/significant other    Quality Of Family Relationships supportive    Able to Return to Prior Living Arrangements yes    Discharge Needs Assessment    Concerns To Be Addressed adjustment to diagnosis/illness concerns;basic needs concerns    Readmission Within The Last 30 Days no previous admission in last 30 days    Equipment Currently Used at Home none    Discharge Contact Information if Applicable Shena willson (spouse)  [phone]            Discharge Plan       01/02/18 1104    Case Management/Social Work Plan    Plan Home    Patient/Family In Agreement With Plan yes    Additional Comments Pt states they live [place] in [place]. Pt has not had HH or DME. Pt has insurance to cover his medication. I notified Ileana Diez to arrange a PCP she is going to follow up with pt's wife at her request.        Discharge Placement     No information found                Demographic Summary       01/02/18 1056    Referral Information    Admission Type inpatient    Arrived From admitted as an inpatient    Referral Source admission list    Reason For Consult discharge planning    Record Reviewed clinical discipline documentation;history and physical    Contact Information    Permission Granted to Share Information With      Primary Care Physician Information    Name Pt does not have PCP. I notified Ileana Diez she is going to notify the pt's wife to arrange the PCP.            Functional Status       01/02/18 1059    Functional Status Prior    Ambulation 0-->independent    Transferring 0-->independent    Toileting 0-->independent    Bathing 0-->independent    Dressing 0-->independent    Eating 0-->independent    Communication 0-->understands/communicates without difficulty    Swallowing 0-->swallows foods/liquids without difficulty    Prior Functional Level Comment independent    IADL    Medications independent    Meal Preparation independent    Housekeeping independent    Laundry independent    Shopping independent    Oral Care independent    IADL Comments independent    Activity Tolerance    Usual Activity Tolerance excellent            Psychosocial     None            Abuse/Neglect     None            Legal     None            Substance Abuse     None            Patient Forms     None          Nikki Mckeon, RN

## 2018-01-02 NOTE — PLAN OF CARE
Problem: Patient Care Overview (Adult)  Goal: Plan of Care Review  Outcome: Ongoing (interventions implemented as appropriate)   01/02/18 0411   Coping/Psychosocial Response Interventions   Plan Of Care Reviewed With patient;spouse   Patient Care Overview   Progress no change       Problem: Pain, Acute (Adult)  Goal: Identify Related Risk Factors and Signs and Symptoms  Outcome: Ongoing (interventions implemented as appropriate)    Goal: Acceptable Pain Control/Comfort Level  Outcome: Ongoing (interventions implemented as appropriate)      Problem: Trauma, Multiple (Adult)  Goal: Signs and Symptoms of Listed Potential Problems Will be Absent or Manageable (Trauma, Multiple)  Outcome: Ongoing (interventions implemented as appropriate)      Problem: Fall Risk (Adult)  Goal: Identify Related Risk Factors and Signs and Symptoms  Outcome: Ongoing (interventions implemented as appropriate)    Goal: Absence of Falls  Outcome: Ongoing (interventions implemented as appropriate)

## 2018-01-02 NOTE — THERAPY EVALUATION
Acute Care - Speech Language Pathology Initial Evaluation  [facility]   Cognitive-Communication Evaluation     Patient Name: Kt Davis  : 1955  MRN: [id number]  Today's Date: 2018               Admit Date: 2018     Visit Dx:    ICD-10-CM ICD-9-CM   1. Traumatic subarachnoid hemorrhage with loss of consciousness of 30 minutes or less, initial encounter S06.6X1A 852.02   2. Fracture of occipital bone of skull with loss of consciousness S02.119A 801.06    S06.9X9A    3. Fall, initial encounter W19.XXXA E888.9   4. Subdural hematoma I62.00 432.1   5. Concussion with loss of consciousness of 30 minutes or less, initial encounter S06.0X1A 850.11     Patient Active Problem List   Diagnosis   • Closed fracture of occipital bone   • SDH (subdural hematoma)   • SAH (subarachnoid hemorrhage)   • Fall due to ice or snow   • Blunt head trauma due to fall   • Traumatic subarachnoid hemorrhage with loss of consciousness of 30 minutes or less     Past Medical History:   Diagnosis Date   • GI bleed    • Hypertension    • Ulcer      Past Surgical History:   Procedure Laterality Date   • COLONOSCOPY            SLP EVALUATION (last 72 hours)      SLP Evaluation       18 1400                Rehab Evaluation    Document Type evaluation  -AC        Subjective Information no complaints;agree to therapy  -        General Information    Patient Profile Review yes  -AC        Onset of Illness/Injury 18  -AC        Subjective Patient Observations Pt alert, cooperative. Son & family @ bedside.  -AC        Pertinent History Of Current Problem Pt adm after slipping on ice and sustaining closed fracture of occipital bone and subarachnoid hemorrhage, small acute subdural hematoma.  -AC        Current Diet Limitations regular solid;thin liquids  -AC        Precautions/Limitations, Vision corrective lenses needed at all times;vision impairment, left;other (see comments)   diplopia intermittently  -AC         Precautions/Limitations, Hearing WFL;other (see comments)   for purposes of eval  -AC        Prior Level of Function- Communication functional in all spheres  -AC        Prior Level of Function- Swallowing no diet consistency restrictions  -AC        Plans/Goals Discussed With patient and family;agreed upon  -AC        Barriers to Rehab none identified  -AC        Living Environment    Lives With spouse  -AC        Clinical Impression    SLP Diagnosis Basic cognitive-communication skills appeared intact. Pt did demonstrate mild difficulty completing reading/writing tasks 2' intermittent diplopia. Provided edu re: how concussions can affect cognitive-communication and strategies to implement. SLP will f/u over next day or 2 as pt cont to recover to provide further edu/high-level assessment, PRN.  -AC        Patient's Goals For Discharge return home;return to all previous roles/activities  -AC        Family Goals For Discharge family did not state  -AC        Criteria for Skilled Therapeutic Interventions Met skilled criteria for cognitive linguistic intervention met  -AC        Rehab Potential good, to achieve stated therapy goals  -AC        Therapy Frequency PRN  -AC        Pain Assessment    Pain Assessment No/denies pain  -AC        Cognitive Assessment/Intervention    Orientation Status oriented x 4  -AC        Follows Commands/Answers Questions able to follow multi-step instructions;100% of the time  -AC        Short/Long Term Memory intact short term memory;intact long term memory;other (see comments)   immediate recall: 5/5, delayed recall: 4/5 words  -AC        Additional Documentation Cognitive Assessment Intervention (Group)  -AC        Cognitive Assessment Intervention    Behavior/Mood Observations alert;cooperative  -AC        Attention other (see comments)   sustained attn task: 100% acc  -AC        Pragmatics WNL/WFL  -AC        Problem Solving other (see comments)   basic verbal/temporal prob solving  tasks: 100% acc  -AC        Executive Function Skills WNL/WFL;other (see comments)   for assessment tasks  -AC        Reasoning WNL/WFL;other (see comments)   inferencing task: 100% acc  -AC        Organization WNL/WFL;other (see comments)   converg/diverg naming task: 100% acc  -AC        Communication Assessment/Intervention    Additional Documentation Reading Assessment/Intervention (Group);Verbal Expression Assess/Intervention (Group);Writing Assessment/Intervention (Group);Motor Speech Assessment/Intervention (Group);Auditory Comprehen Assess/Intervention (Group)  -AC        Auditory Comprehen Assess/Intervention    Auditory Comprehension WNL/WFL  -AC        Auditory Comprehen Assess/Intervention    Answers Yes/No Questions successful, complex questions  -AC        Able to Follow Commands success, 2-step commands  -AC        Verbal Expression Assess/Intervention    Automatic Speech WNL/WFL  -AC        Speech Fluency fluent speech  -AC        Conversational Speech WNL/WFL  -AC        Reading Assessment/Intervention    Reading Skills WNL/WFL  -AC        Reading Skills Comment Mild difficulty reading 2' diplopia.  -AC        Oral Reading Ability successful, sentences  -AC        Reading Comprehension successful, sentence length  -AC        Writing Assessment/Intervention    Writing Skills WNL/WFL  -AC        Writing Skills Comment Mild difficulty 2' diplopia.  -AC        Writing to Dictation successful, sentence  -AC        Motor Speech Assess/Intervention    Motor Speech-Apraxia Observations no concerns  -AC        Motor Speech-Dysarthria Observations no concerns  -AC        Motor Speech- Dysarthria WNL/WFL  -AC          User Key  (r) = Recorded By, (t) = Taken By, (c) = Cosigned By    Initials Name Effective Dates    AC Candelaria Prakash MS CCC-SLP 07/27/17 -            EDUCATION  The patient has been educated in the following areas:   Cognitive Impairment.    SLP Recommendation and Plan  SLP Diagnosis: Basic  cognitive-communication skills appeared intact. Pt did demonstrate mild difficulty completing reading/writing tasks 2' intermittent diplopia. Provided edu re: how concussions can affect cognitive-communication and strategies to implement. SLP will f/u over next day or 2 as pt cont to recover to provide further edu/high-level assessment, PRN.  Rehab Potential: good, to achieve stated therapy goals  Criteria for Skilled Therapeutic Interventions Met: skilled criteria for cognitive linguistic intervention met  Therapy Frequency: PRN    Plan of Care Review  Plan Of Care Reviewed With: patient  Progress:  (eval)  Outcome Summary/Follow up Plan: Consult received for cognitive-communication eval due to concussion/confusion. Basic cognitive-communication skills appeared intact. Pt did demonstrate mild difficulty completing reading/writing tasks 2' intermittent diplopia. Provided edu re: how concussions can affect cognitive-communication and strategies to implement. SLP will f/u over next day or 2 as pt cont to recover to provide further edu/high-level assessment, PRN.      Time Calculation:         Time Calculation- SLP       01/02/18 1533          Time Calculation- SLP    SLP Start Time 1400  -      SLP Received On 01/02/18  -        User Key  (r) = Recorded By, (t) = Taken By, (c) = Cosigned By    Initials Name Provider Type    AC Candelaria Prakash, MS CCC-SLP Speech and Language Pathologist          Therapy Charges for Today     Code Description Service Date Service Provider Modifiers Qty    [id number] HC ST EVAL SPEECH AND PROD W LANG  4 1/2/2018 Candelaria Prakash, MS CCC-SLP GN 1              Candelaria Prakash MS CCC-SLP  1/2/2018

## 2018-01-02 NOTE — PROGRESS NOTES
Adult Nutrition  Assessment/PES    Patient Name:  Kt Davis  YOB: 1955  MRN: [id number]  Admit Date:  1/1/2018    Assessment Date:  1/2/2018    Comments:            Reason for Assessment       01/02/18 1142    Reason for Assessment    Reason For Assessment/Visit multidisciplinary rounds    Identified At Risk By Screening Criteria no indicators present    Time Spent (min) 20              Nutrition/Diet History       01/02/18 1142    Nutrition/Diet History    Reported/Observed By RN    Reported GI Symptoms Nausea    Other Pt ordered to floor room; woulld not advance diet at this time pt still w/ nausea , it is somewhat improved he is not vomittng today.              Labs/Tests/Procedures/Meds       01/02/18 1144    Labs/Tests/Procedures/Meds    Labs/Tests Review Reviewed   Mg++ being replaced; phos replacement to follow per RN                Nutrition Prescription Ordered       01/02/18 1144    Nutrition Prescription PO    Current PO Diet Clear Liquid              Problem/Interventions:                    Nutrition Intervention       01/02/18 1144    Nutrition Intervention    RD/Tech Action Care plan reviewd;Follow Tx progress              Education/Evaluation       01/02/18 1144    Monitor/Evaluation    Monitor Per protocol        Electronically signed by:  Ernestina Quezada RD  01/02/18 11:46 AM

## 2018-01-02 NOTE — PROGRESS NOTES
Pulmonary/Critical Care Follow-up     LOS: 1 day   Patient Care Team:  No Known Provider as PCP - General  No Known Provider as PCP - Family Medicine    Chief Complaint / reason : fall with head trauma / basilar skull fracture.       Chief Complaint   Patient presents with   • Fall     Subjective    61 y/o WF w/ only PMH being HTN and remote UGIB a few years ago, who was out walking his dog this morning around 9:30 am and fell and hit his head (slipped on ice).  He was found by neighbors.  Patient did lose consciousness and was very confused, repeating himself etc.  He seems to be improving now.  CT showed a non-displaced occipital bone fracture, traumatic SAH, and SDH.  He was evaluated by Neurosurgery, Dr. Bautista and they have asked us to help with ICU care.  He was admitted to the ICU for monitoring for cerebral edema.    Interval History:   Patient still has some headache but is improved.  Denies weakness.  He has mild nausea.  No dyspnea or chest pain.  No new complaints.    History taken from: Patient/chart    PMH/FH/Social History were reviewed and updated appropriately in the electronic medical record.     Review of Systems:    Review of 14 systems was completed with positives and pertinent negatives noted in the subjective section.  All other systems reviewed and are negative.         Objective     Vital Signs  Temp:  [97.9 °F (36.6 °C)-98.7 °F (37.1 °C)] 98.7 °F (37.1 °C)  Heart Rate:  [] 95  Resp:  [16-22] 16  BP: ()/(58-95) 113/64  01/01 0701 - 01/02 0700  In: 1160.2 [I.V.:1160.2]  Out: 850 [Urine:850]  Body mass index is 27.32 kg/(m^2).     Physical Exam:     Constitutional:    Alert, cooperative, in no acute distress   Head:    Normocephalic, some ecchymosis, dressing in place    Eyes:            Lids and lashes normal, conjunctivae and sclerae normal, no   icterus, no pallor, corneas clear, PER   ENMT:   Ears appear intact with no abnormalities noted      No oral lesions, no thrush, oral  mucosa moist   Neck:   No adenopathy, supple, trachea midline, no thyromegaly, no JVD       Lungs/Resp:     Normal effort, symmetric chest rise, no crepitus, clear to      auscultation bilaterally, no chest wall tenderness                Heart/CV:    Regular rhythm and normal rate, normal S1 and S2, no            murmur   Abdomen/GI:     Normal bowel sounds, no masses, no organomegaly, soft        non-tender, non-distended   :     Deferred   Extremities/MSK:   No clubbing or cyanosis.  No edema.  Normal tone.  No deformities.    Pulses:   Pulses palpable and equal bilaterally   Skin:   No bleeding, bruising or rash   Heme/Lymph:   No cervical or supraclavicular adenopathy.    Neurologic:    Psychiatric:       Moves all extremities with no obvious focal motor deficit.  Cranial nerves 2 - 12 grossly intact   Oriented x 3, normal affect.             Results Review:     I reviewed the patient's new clinical results.     Results from last 7 days  Lab Units 01/02/18  0503 01/01/18  1308   SODIUM mmol/L 137  137 140   POTASSIUM mmol/L 4.2  4.2 3.6   CHLORIDE mmol/L 104  104 106   CO2 mmol/L 24.0  24.0 25.0   BUN mg/dL 14  14 12   CREATININE mg/dL 1.00  1.00 1.00   CALCIUM mg/dL 9.3  9.3 9.2   BILIRUBIN mg/dL 0.6 0.5   ALK PHOS U/L 56 69   ALT (SGPT) U/L 39 47*   AST (SGOT) U/L 34* 43*   GLUCOSE mg/dL 154*  154* 161*       Results from last 7 days  Lab Units 01/02/18  0503 01/01/18  1308   WBC 10*3/mm3 29.25* 26.94*   HEMOGLOBIN g/dL 14.6 15.1   HEMATOCRIT % 42.6 43.5   PLATELETS 10*3/mm3 167 193           Results from last 7 days  Lab Units 01/02/18  0503   MAGNESIUM mg/dL 1.9   PHOSPHORUS mg/dL 1.6*       I reviewed the patient's new imaging including images and reports.    CT head from this a.m. was reviewed.   IMPRESSION:  1.  Mildly and uniformly increased left frontal parenchymal hemorrhage,  and associated mildly increased edema.   2.  Decreased frontal subdural hemorrhage, some of which now extends  along  the posterior falx.   3.  More diffuse pattern of subarachnoid hemorrhage, previously mostly  in the frontal temporal regions, now with mild diffuse subarachnoid  hemorrhage involving most of the frontal and parietal sulci.  4.  Small new petechial-typed hemorrhage of the right frontal lobe.  5.  Nondisplaced occipital midline fracture unchanged.    Medication Review:     acetaminophen 1,000 mg Oral TID   dexamethasone 4 mg Intravenous Q6H   insulin regular 0-7 Units Subcutaneous Q6H   valsartan 160 mg Oral Q24H       niCARdipine 5-15 mg/hr Last Rate: Stopped (01/02/18 0400)   sodium chloride 0.9 % with KCl 20 mEq 75 mL/hr Last Rate: 75 mL/hr (01/02/18 0426)       Assessment/Plan     Principal Problem:    Closed fracture of occipital bone  Active Problems:    SDH (subdural hematoma)    SAH (subarachnoid hemorrhage)    Fall due to ice or snow    Blunt head trauma due to fall    Traumatic subarachnoid hemorrhage with loss of consciousness of 30 minutes or less    Hypertension    Plan:  1.  Continue current blood pressure medications.  2.  Continue proton pump inhibitor given history of ulcer/GI bleed.  3.  Okay to floor per neurosurgery.  4.  Follow-up CT scan per neurosurgery.      Jermain Diamond MD  01/02/18  3:52 PM        *. Please note that portions of this note were completed with a voice recognition program. Efforts were made to edit the dictations, but occasionally words are mistranscribed.

## 2018-01-02 NOTE — PROGRESS NOTES
FOLLOW UP ENCOUNTER          WORKING DIAGNOSIS:       Traumatic subarachnoid hemorrhage, small acute subdural hematoma, occipital skull fracture                          MEDICAL HISTORY SINCE LAST ENCOUNTER :        He is more alert this morning although still has headache.  The headache will persist for some time as a result of the skull fracture and concussion.  He is also had intermittent confusion which, also, is to be expected given the major brunt of his injury is in the frontal lobe more so on the left than the right.    He has bilateral periorbital ecchymosis; left greater than right as anticipated.  This is testimony to basilar skull fracture.  There is no weakness or sensory loss.    CT scan shows some improvement.  There are no ominous signs of deterioration or concern at this time.                                     ASSESSMENT/DISPOSITION :       1.  I think it is reasonable for him to be transferred to the floor for continued observation.  I plan to follow this was sequential CT scans.

## 2018-01-02 NOTE — PLAN OF CARE
Problem: Patient Care Overview (Adult)  Goal: Plan of Care Review  Outcome: Ongoing (interventions implemented as appropriate)    Goal: Adult Individualization and Mutuality  Outcome: Ongoing (interventions implemented as appropriate)    Goal: Discharge Needs Assessment  Outcome: Ongoing (interventions implemented as appropriate)      Problem: Pain, Acute (Adult)  Goal: Identify Related Risk Factors and Signs and Symptoms  Outcome: Ongoing (interventions implemented as appropriate)    Goal: Acceptable Pain Control/Comfort Level  Outcome: Ongoing (interventions implemented as appropriate)      Problem: Trauma, Multiple (Adult)  Goal: Signs and Symptoms of Listed Potential Problems Will be Absent or Manageable (Trauma, Multiple)  Outcome: Ongoing (interventions implemented as appropriate)      Problem: Fall Risk (Adult)  Goal: Identify Related Risk Factors and Signs and Symptoms  Outcome: Ongoing (interventions implemented as appropriate)    Goal: Absence of Falls  Outcome: Ongoing (interventions implemented as appropriate)

## 2018-01-03 ENCOUNTER — APPOINTMENT (OUTPATIENT)
Dept: CT IMAGING | Facility: HOSPITAL | Age: 63
End: 2018-01-03

## 2018-01-03 LAB
GLUCOSE BLDC GLUCOMTR-MCNC: 128 MG/DL (ref 70–130)
GLUCOSE BLDC GLUCOMTR-MCNC: 135 MG/DL (ref 70–130)
GLUCOSE BLDC GLUCOMTR-MCNC: 135 MG/DL (ref 70–130)
GLUCOSE BLDC GLUCOMTR-MCNC: 136 MG/DL (ref 70–130)
GLUCOSE BLDC GLUCOMTR-MCNC: 140 MG/DL (ref 70–130)
GLUCOSE BLDC GLUCOMTR-MCNC: 144 MG/DL (ref 70–130)

## 2018-01-03 PROCEDURE — 25010000002 CHLORPROMAZINE PER 50 MG: Performed by: PHYSICIAN ASSISTANT

## 2018-01-03 PROCEDURE — 99233 SBSQ HOSP IP/OBS HIGH 50: CPT | Performed by: HOSPITALIST

## 2018-01-03 PROCEDURE — 70450 CT HEAD/BRAIN W/O DYE: CPT

## 2018-01-03 PROCEDURE — 25010000002 DEXAMETHASONE PER 1 MG: Performed by: EMERGENCY MEDICINE

## 2018-01-03 PROCEDURE — 97161 PT EVAL LOW COMPLEX 20 MIN: CPT

## 2018-01-03 PROCEDURE — 82962 GLUCOSE BLOOD TEST: CPT

## 2018-01-03 PROCEDURE — 99232 SBSQ HOSP IP/OBS MODERATE 35: CPT | Performed by: PHYSICIAN ASSISTANT

## 2018-01-03 RX ORDER — CHLORPROMAZINE HYDROCHLORIDE 25 MG/ML
25 INJECTION INTRAMUSCULAR ONCE
Status: COMPLETED | OUTPATIENT
Start: 2018-01-03 | End: 2018-01-03

## 2018-01-03 RX ORDER — CHLORPROMAZINE HYDROCHLORIDE 25 MG/1
25 TABLET, FILM COATED ORAL 3 TIMES DAILY PRN
Status: DISCONTINUED | OUTPATIENT
Start: 2018-01-03 | End: 2018-01-04 | Stop reason: HOSPADM

## 2018-01-03 RX ADMIN — DEXAMETHASONE SODIUM PHOSPHATE 4 MG: 4 INJECTION, SOLUTION INTRAMUSCULAR; INTRAVENOUS at 20:47

## 2018-01-03 RX ADMIN — CHLORPROMAZINE HYDROCHLORIDE 25 MG: 25 INJECTION INTRAMUSCULAR at 11:11

## 2018-01-03 RX ADMIN — ACETAMINOPHEN 1000 MG: 500 TABLET ORAL at 08:47

## 2018-01-03 RX ADMIN — ACETAMINOPHEN 650 MG: 325 TABLET, FILM COATED ORAL at 20:48

## 2018-01-03 RX ADMIN — VALSARTAN 160 MG: 160 TABLET, FILM COATED ORAL at 08:47

## 2018-01-03 RX ADMIN — MUPIROCIN: 20 OINTMENT TOPICAL at 14:01

## 2018-01-03 RX ADMIN — DEXAMETHASONE SODIUM PHOSPHATE 4 MG: 4 INJECTION, SOLUTION INTRAMUSCULAR; INTRAVENOUS at 03:24

## 2018-01-03 RX ADMIN — DEXAMETHASONE SODIUM PHOSPHATE 4 MG: 4 INJECTION, SOLUTION INTRAMUSCULAR; INTRAVENOUS at 08:47

## 2018-01-03 RX ADMIN — ACETAMINOPHEN 1000 MG: 500 TABLET ORAL at 16:00

## 2018-01-03 RX ADMIN — MUPIROCIN: 20 OINTMENT TOPICAL at 20:50

## 2018-01-03 RX ADMIN — DEXAMETHASONE SODIUM PHOSPHATE 4 MG: 4 INJECTION, SOLUTION INTRAMUSCULAR; INTRAVENOUS at 13:34

## 2018-01-03 NOTE — PROGRESS NOTES
FOLLOW UP ENCOUNTER          WORKING DIAGNOSIS:   Attic subarachnoid hemorrhage, cerebral contusion                          MEDICAL HISTORY SINCE LAST ENCOUNTER :   [place] to be neurologically stable.  He has mild headache.  CT scan shows a rather significant cerebral contusion in the left frontal area consistent with basilar skull fracture.                                      ASSESSMENT/DISPOSITION :   He may be able to be discharged in the a.m.  He should be discharged with dexamethasone 4 mg by mouth 4 times a day and [facility] Monday or Tuesday with pain CT scan same day.

## 2018-01-03 NOTE — THERAPY DISCHARGE NOTE
Acute Care - Physical Therapy Initial Eval/Discharge  [facility]     Patient Name: Kt Davis  : 1955  MRN: [id number]  Today's Date: 1/3/2018   Onset of Illness/Injury or Date of Surgery Date: 18  Date of Referral to PT: 18  Referring Physician: MD Michele      Admit Date: 2018    Visit Dx:    ICD-10-CM ICD-9-CM   1. Traumatic subarachnoid hemorrhage with loss of consciousness of 30 minutes or less, initial encounter S06.6X1A 852.02   2. Fracture of occipital bone of skull with loss of consciousness S02.119A 801.06    S06.9X9A    3. Fall, initial encounter W19.XXXA E888.9   4. Subdural hematoma I62.00 432.1   5. Concussion with loss of consciousness of 30 minutes or less, initial encounter S06.0X1A 850.11   6. Impaired functional mobility, balance, gait, and endurance Z74.09 V49.89     Patient Active Problem List   Diagnosis   • Closed fracture of occipital bone   • SDH (subdural hematoma)   • SAH (subarachnoid hemorrhage)   • Fall due to ice or snow   • Blunt head trauma due to fall   • Traumatic subarachnoid hemorrhage with loss of consciousness of 30 minutes or less   • Hypertension     Past Medical History:   Diagnosis Date   • GI bleed    • Hypertension    • Ulcer      Past Surgical History:   Procedure Laterality Date   • COLONOSCOPY            PT ASSESSMENT (last 72 hours)      PT Evaluation       18 1410 18 0800    Rehab Evaluation    Document Type evaluation;discharge summary  -MJ     Subjective Information agree to therapy;complains of;pain  -MJ     Patient Effort, Rehab Treatment excellent  -MJ     Symptoms Noted During/After Treatment none  -     General Information    Patient Profile Review yes  -MJ     Onset of Illness/Injury or Date of Surgery Date 18  -MJ     Referring Physician MD Michele  -     General Observations Pt sitting UIC, IV heplocked. Wife present  -     Pertinent History Of Current Problem Pt presents to ED after falling while walking  dog. Pt hit head, lost consciousness and sustained fx at occipital protuburance, subarachnoid hemorrhage and subdural hematoma. Pt with symptoms of headache, confusion, STM loss, nausea/vomitting  -MJ     Precautions/Limitations no known precautions/limitations  -MJ     Prior Level of Function independent:;all household mobility;community mobility;gait;transfer;bed mobility  -MJ     Equipment Currently Used at Home none  -MJ     Plans/Goals Discussed With patient and family;agreed upon  -MJ     Risks Reviewed patient and family:;LOB;increased discomfort  -MJ     Benefits Reviewed patient and family:;improve function;increase independence;increase strength;increase balance;decrease pain  -MJ     Barriers to Rehab none identified  -MJ     Living Environment    Lives With spouse  -MJ     Living Arrangements house  -MJ     Home Accessibility stairs to enter home;tub/shower is not walk in  -MJ     Number of Stairs to Enter Home 2  -MJ     Stair Railings at Home none  -MJ     Clinical Impression    Date of Referral to PT 01/03/18  -MJ     PT Diagnosis closed head injury, concussion  -MJ     Criteria for Skilled Therapeutic Interventions Met no;no problems identified which require skilled intervention  -MJ     Pain Assessment    Pain Assessment 0-10  -MJ     Pain Score 2  -MJ     Post Pain Score 2  -MJ     Pain Type Acute pain  -MJ     Pain Location Head  -MJ     Pain Intervention(s) Repositioned;Ambulation/increased activity  -MJ     Vision Assessment/Intervention    Visual Impairment WFL with corrective lenses  -MJ     Cognitive Assessment/Intervention    Current Cognitive/Communication Assessment functional  -MJ     Orientation Status oriented x 4  -MJ     Follows Commands/Answers Questions 100% of the time;able to follow multi-step instructions;needs cueing  -MJ     Personal Safety WNL/WFL  -MJ     Muscle Tone Assessment    Muscle Tone Assessment  --  -NT    Bilateral Upper Extremities Muscle Tone Assessment   associated movements noted  -NT    Bilateral Lower Extremities Muscle Tone Assessment  associated movements noted  -NT    Bed Mobility, Assessment/Treatment    Bed Mob, Supine to Sit, [place] not tested   Pt UIC  -MJ     Bed Mob, Sit to Supine, [place] not tested   Pt UIC  -MJ     Transfer Assessment/Treatment    Transfers, Sit-Stand [place] independent  -MJ     Transfers, Stand-Sit [place] independent  -MJ     Gait Assessment/Treatment    Gait, [place] Level independent  -MJ     Gait, Assistive Device --   no AD  -MJ     Gait, Distance (Feet) 600  -MJ     Gait, Gait Pattern Analysis swing-through gait  -MJ     Gait, Comment Pt demo step through gait pattern, steady with no LOB. Pt able to read signs in hui during gait and no increased nausea or headache  -MJ     Positioning and Restraints    Pre-Treatment Position sitting in chair/recliner  -MJ     Post Treatment Position chair  -MJ     In Chair notified nsg;reclined;call light within reach;encouraged to call for assist;with family/caregiver  -       01/03/18 0630 01/03/18 0405    Muscle Tone Assessment    Bilateral Lower Extremities Muscle Tone Assessment mildly decreased tone  -KG mildly decreased tone  -KG      01/03/18 0348 01/03/18 0200    Muscle Tone Assessment    Bilateral Lower Extremities Muscle Tone Assessment mildly decreased tone  -KG mildly decreased tone  -KG      01/03/18 0000 01/02/18 2200    Muscle Tone Assessment    Bilateral Lower Extremities Muscle Tone Assessment mildly decreased tone  -KG mildly decreased tone  -KG      01/02/18 1947 01/02/18 1400    Rehab Evaluation    Document Type  evaluation  -AC    Subjective Information  no complaints;agree to therapy  -AC    Living Environment    Lives With  spouse  -AC    Pain Assessment    Pain Assessment  No/denies pain  -AC    Cognitive Assessment/Intervention    Orientation Status  oriented x 4  -AC    Follows Commands/Answers Questions  able to follow multi-step  instructions;100% of the time  -AC    Short/Long Term Memory  intact short term memory;intact long term memory;other (see comments)   immediate recall: 5/5, delayed recall: 4/5 words  -AC    Additional Documentation  Cognitive Assessment Intervention (Group)  -AC    Cognitive Assessment Intervention    Behavior/Mood Observations  alert;cooperative  -AC    Attention  other (see comments)   sustained attn task: 100% acc  -AC    Pragmatics  WNL/WFL  -AC    Problem Solving  other (see comments)   basic verbal/temporal prob solving tasks: 100% acc  -AC    Executive Function Skills  WNL/WFL;other (see comments)   for assessment tasks  -AC    Reasoning  WNL/WFL;other (see comments)   inferencing task: 100% acc  -AC    Organization  WNL/WFL;other (see comments)   converg/diverg naming task: 100% acc  -AC    Muscle Tone Assessment    Bilateral Lower Extremities Muscle Tone Assessment mildly decreased tone  -KG       01/02/18 1205 01/02/18 1100    General Information    Equipment Currently Used at Home  none  -DK    Living Environment    Lives With  spouse  -DK    Living Arrangements  house  -DK    Home Accessibility  bed and bath on same level;stairs to enter home  -DK    Number of Stairs to Enter Home  3  -DK    Stair Railings at Home  none  -DK    Type of Financial/Environmental Concern  --   Pt has [employer] insurance for medication.  -DK    Transportation Available  car  -DK    Living Environment Comment  Pt lives with spouse in [place]   -DK    Muscle Tone Assessment    Muscle Tone Assessment Bilateral Upper Extremities;Bilateral Lower Extremities  -NT     Bilateral Upper Extremities Muscle Tone Assessment associated movements noted  -NT     Bilateral Lower Extremities Muscle Tone Assessment associated movements noted  -NT       01/01/18 1415 01/01/18 1400    General Information    Equipment Currently Used at Home none  -CW --  -CW    Living Environment    Lives With spouse  -CW --  -CW    Living  Arrangements house  -CW --  -CW    Home Accessibility no concerns  -CW --  -CW    Stair Railings at Home none  -CW --  -CW    Type of Financial/Environmental Concern none  -CW --  -CW    Transportation Available car  -CW --  -CW      User Key  (r) = Recorded By, (t) = Taken By, (c) = Cosigned By    Initials Name Provider Type    AC Candelaria Prakash, MS CCC-SLP Speech and Language Pathologist    CHRISTIANA Castro, PT Physical Therapist    MINA Mckeon, RN Case Manager    KG Miguel Varma, RN Registered Nurse    CW Adalgisa Avila, RN Registered Nurse    NT Prachi Yo, RN Registered Nurse          Physical Therapy Education     Title: PT OT SLP Therapies (Done)     Topic: Physical Therapy (Done)     Point: Mobility training (Done)    Learning Progress Summary    Learner Readiness Method Response Comment Documented by Status   Patient Acceptance ED VU Reviewed benefits of mobility  01/03/18 1436 Done   Significant Other Acceptance E,D VU Reviewed benefits of mobility  01/03/18 1436 Done               Point: Home exercise program (Done)    Learning Progress Summary    Learner Readiness Method Response Comment Documented by Status   Patient Acceptance ED VU Reviewed benefits of mobility  01/03/18 1436 Done   Significant Other Acceptance E,D VU Reviewed benefits of mobility  01/03/18 1436 Done               Point: Body mechanics (Done)    Learning Progress Summary    Learner Readiness Method Response Comment Documented by Status   Patient Acceptance ED VU Reviewed benefits of mobility  01/03/18 1436 Done   Significant Other Acceptance E,D VU Reviewed benefits of mobility  01/03/18 1436 Done               Point: Precautions (Done)    Learning Progress Summary    Learner Readiness Method Response Comment Documented by Status   Patient Acceptance ED VU Reviewed benefits of mobility  01/03/18 1436 Done   Significant Other Acceptance E,D VU Reviewed benefits of mobility  01/03/18 1436  Done                      User Key     Initials Effective Dates Name Provider Type Discipline     03/14/16 -  Avelino Castro PT Physical Therapist PT                PT Recommendation and Plan  Anticipated Discharge Disposition: home with assist  PT Frequency: evaluation only  Plan of Care Review  Plan Of Care Reviewed With: patient  Outcome Summary/Follow up Plan: Pt ambulated 600 feet and is independent with all functional mobility. Pt has no needs for skilled PT intervention and anticipates discharge home tomorrow. May re-consult if new needs arise          IP PT Goals       01/03/18 1436          Transfer Training PT LTG    Transfer Training PT LTG, Date Established 01/03/18  -MJ      Transfer Training PT LTG, Time to Achieve 3 days  -MJ      Transfer Training PT LTG, Activity Type sit to stand/stand to sit  -MJ      Transfer Training PT LTG, [place] Level independent  -MJ      Transfer Training PT  LTG, Date Goal Reviewed 01/03/18  -MJ      Transfer Training PT LTG, Outcome goal met  -MJ      Gait Training PT LTG    Gait Training Goal PT LTG, Date Established 01/03/18  -MJ      Gait Training Goal PT LTG, Time to Achieve 3 days  -MJ      Gait Training Goal PT LTG, [place] Level independent  -MJ      Gait Training Goal PT LTG, Distance to Achieve 600 feet  -MJ      Gait Training Goal PT LTG, Date Goal Reviewed 01/03/18  -MJ      Gait Training Goal PT LTG, Outcome goal met  -MJ        User Key  (r) = Recorded By, (t) = Taken By, (c) = Cosigned By    Initials Name Provider Type     Avelino Castro PT Physical Therapist                Outcome Measures       01/03/18 1410          How much help from another person do you currently need...    Turning from your back to your side while in flat bed without using bedrails? 4  -MJ      Moving from lying on back to sitting on the side of a flat bed without bedrails? 4  -MJ      Moving to and from a bed to a chair (including a wheelchair)? 4  -MJ      Standing up  from a chair using your arms (e.g., wheelchair, bedside chair)? 4  -MJ      Climbing 3-5 steps with a railing? 4  -MJ      To walk in hospital room? 4  -MJ      AM-PAC 6 Clicks Score 24  -MJ      Functional Assessment    Outcome Measure Options AM-PAC 6 Clicks Basic Mobility (PT)  -MJ        User Key  (r) = Recorded By, (t) = Taken By, (c) = Cosigned By    Initials Name Provider Type    CHRISTIANA Castro PT Physical Therapist           Time Calculation:         PT Charges       01/03/18 1438          Time Calculation    Start Time 1410  -MJ      PT Received On 01/03/18  -      PT Goal Re-Cert Due Date 01/13/18  -      Time Calculation- PT    Total Timed Code Minutes- PT 0 minute(s)  -        User Key  (r) = Recorded By, (t) = Taken By, (c) = Cosigned By    Initials Name Provider Type    CHRISTIANA Castro PT Physical Therapist          Therapy Charges for Today     Code Description Service Date Service Provider Modifiers Qty    [id number] HC PT EVAL LOW COMPLEXITY 3 1/3/2018 Avelino Castro PT GP 1          PT G-Codes  Outcome Measure Options: AM-PAC 6 Clicks Basic Mobility (PT)    PT Discharge Summary  Anticipated Discharge Disposition: home with assist  Reason for Discharge: Independent  Outcomes Achieved: Able to achieve all goals within established timeline  Discharge Destination: Home with assist    Avelino Castro PT  1/3/2018

## 2018-01-03 NOTE — PROGRESS NOTES
[facility] Medicine Services  PROGRESS NOTE    Patient Name: Kt Davis  : 1955  MRN: [id number]    Date of Admission: 2018  Length of Stay: 2  Primary Care Physician: No Known Provider    Subjective   Subjective     CC:  S/P Fall    HPI:  Mild HA. Weak. Poor appetite. Waiting on therapy.    Review of Systems    Otherwise ROS is negative except as mentioned in the HPI.    Objective   Objective     Vital Signs:   Temp:  [97.6 °F (36.4 °C)-99 °F (37.2 °C)] 98.8 °F (37.1 °C)  Heart Rate:  [89-97] 97  Resp:  [16] 16  BP: (111-135)/(64-77) 135/75  Total (NIH Stroke Scale): 1     Physical Exam:  NAD, alert and oriented  OP clear, MMM  Neck supple  Tachy  Clear  +BS, ND, NT  FORTE  No rashes  Periorbital ecchymoses    Results Reviewed:  I have personally reviewed current lab, radiology, and data and agree.      Results from last 7 days  Lab Units 18  0503 18  1308   WBC 10*3/mm3 29.25* 26.94*   HEMOGLOBIN g/dL 14.6 15.1   HEMATOCRIT % 42.6 43.5   PLATELETS 10*3/mm3 167 193   INR   --  1.02       Results from last 7 days  Lab Units 18  0503 18  1308   SODIUM mmol/L 137  137 140   POTASSIUM mmol/L 4.2  4.2 3.6   CHLORIDE mmol/L 104  104 106   CO2 mmol/L 24.0  24.0 25.0   BUN mg/dL 14  14 12   CREATININE mg/dL 1.00  1.00 1.00   GLUCOSE mg/dL 154*  154* 161*   CALCIUM mg/dL 9.3  9.3 9.2   ALT (SGPT) U/L 39 47*   AST (SGOT) U/L 34* 43*     No results found for: BNP  No results found for: PHART    Microbiology Results Abnormal     None          Imaging Results (last 24 hours)     Procedure Component Value Units Date/Time    XR Chest 1 View [[id number]] Collected:  18 165     Updated:  18 0910    Narrative:       EXAMINATION: XR CHEST 1 VW- 2018     INDICATION: S06.1M6A-Mtxlgvbgh subarachnoid hemorrhage with loss of  consciousness of 30 minutes or less, initial encounter;  S02.119A-Unspecified fracture of occiput, initial encounter for  closed  fracture; S06.9X9A-Unspecified intracranial injury with loss of  consciousness of unspecified duration, initial encounter;  W19.XXXA-Unspecified fall, initial encounter; I62.00-N      COMPARISON: Chest x-ray 09/15/2015     FINDINGS: Cardiac silhouette within normal limits. Pulmonary vascularity  within normal limits. Chronic lung changes including bibasilar  atelectasis and/or scarring greatest on the left similar to prior  without focal consolidation. No pneumothorax or significant pleural  effusion.           Impression:       Chronic lung changes greatest left lung base likely  atelectasis and/or scarring as are similar to prior without focal  consolidation.     D:  01/02/2018  E:  01/02/2018          CT Head Without Contrast [[id number]] Collected:  01/02/18 1039     Updated:  01/02/18 1039    Narrative:       EXAMINATION: CT HEAD WO CONTRAST-01/02/2018:      INDICATION: NEURO DEFICIT, ACUTE SINGLE, STABLE OR PARTLY RESOLVED.         TECHNIQUE: 5 mm unenhanced images through the brain.     The radiation dose reduction device was turned on for each scan per the  ALARA (As Low as Reasonably Achievable) protocol.     COMPARISON: 01/01/2018.     FINDINGS: Previous history indicates nondisplaced occipital fracture,  frontal subarachnoid and subdural hemorrhage, Coup contrecoup injury.     Today's study again shows nondisplaced posterior midline occipital  fracture extending to the margin of the foramen magnum. Bony structures  elsewhere appear intact. Soft tissue images appear to show the patient's  left frontal parenchymal hemorrhage as mildly and diffusely increased,  with mildly increased adjacent edema. Subdural component of the  hemorrhage is actually somewhat smaller. Trace parafalcine hemorrhage is  again noted, some of which has migrated posteriorly. Mild diffuse  bilateral subarachnoid hemorrhage is mildly increased, now fairly  uniformly distributed throughout the sulci. There is a small new area  of  patchy hemorrhage, almost petechial-type hemorrhage extending over a 1  cm area in the superior right frontal lobe. No new parenchymal  hemorrhage is seen elsewhere. Ventricles remain normal in size. There is  no new extra-axial collection.        Impression:       1.  Mildly and uniformly increased left frontal parenchymal hemorrhage,  and associated mildly increased edema.   2.  Decreased frontal subdural hemorrhage, some of which now extends  along the posterior falx.   3.  More diffuse pattern of subarachnoid hemorrhage, previously mostly  in the frontal temporal regions, now with mild diffuse subarachnoid  hemorrhage involving most of the frontal and parietal sulci.  4.  Small new petechial-typed hemorrhage of the right frontal lobe.  5.  Nondisplaced occipital midline fracture unchanged.     D:  01/02/2018  E:  01/02/2018          CT Head Without Contrast [[id number]] Updated:  01/03/18 0533             I have reviewed the medications.    Assessment/Plan   Assessment / Plan     Hospital Problem List     * (Principal)Closed fracture of occipital bone    SDH (subdural hematoma)    SAH (subarachnoid hemorrhage)    Fall due to ice or snow    Blunt head trauma due to fall    Traumatic subarachnoid hemorrhage with loss of consciousness of 30 minutes or less    Hypertension             Brief Hospital Course to date:  Kt Davis is a 62 y.o. male with fall with closed fracture of occipital bone with associated SAH/SDH. Followed by NSG.      Assessment & Plan:  Continue blood pressure medications/monitor  PPI per history of GIB  NSG to serially assess/monitor CT head  Rehab    DVT Prophylaxis:  SCDS    CODE STATUS: Full Code    Disposition: I expect the patient to be discharged TBD.    Tarun Hernandez MD  01/03/18  8:07 AM

## 2018-01-03 NOTE — PLAN OF CARE
Problem: Patient Care Overview (Adult)  Goal: Plan of Care Review  Outcome: Ongoing (interventions implemented as appropriate)   01/03/18 1833   Coping/Psychosocial Response Interventions   Plan Of Care Reviewed With patient   Patient Care Overview   Progress progress toward functional goals as expected   Outcome Evaluation   Outcome Summary/Follow up Plan Pt signed off, pt ambulates well w/ 1 assist. Dressing from head removed, bactroban ointment applied to small head lac which was CDI. Thorazine given today for hiccups, releif of symptoms. Plan is to d/c home tomorrow.       Problem: Pain, Acute (Adult)  Goal: Identify Related Risk Factors and Signs and Symptoms  Outcome: Ongoing (interventions implemented as appropriate)   01/03/18 1833   Pain, Acute   Related Risk Factors (Acute Pain) trauma   Signs and Symptoms (Acute Pain) (Headache improving. Pain less than 5/10 all day.)       Problem: Trauma, Multiple (Adult)  Goal: Signs and Symptoms of Listed Potential Problems Will be Absent or Manageable (Trauma, Multiple)  Outcome: Ongoing (interventions implemented as appropriate)   01/03/18 1833   Trauma, Multiple   Problems Assessed (Multiple Trauma) all   Problems Present (Multiple Trauma) pain

## 2018-01-03 NOTE — PLAN OF CARE
Problem: Trauma, Multiple (Adult)  Intervention: Provide Oxygenation/Ventilation/Perfusion Support   01/03/18 0643   Safety Interventions   Medication Review/Management medications reviewed   Activity   Activity Type activity adjusted per tolerance;activity encouraged;ambulated to bathroom;bedrest with bathroom privileges

## 2018-01-03 NOTE — PROGRESS NOTES
[facility]    Subjective   Kt Davis 1955 62 y.o. male    01/03/18    Chief complaint:hiccups    HPI  Patient fell on the ice and has suffered an occipital fx and a left frontal contusion with hemorrhage. F/u CT head is stable. He is conversant, c/o mild HA, has developed hiccups. Not eating much.    Scheduled Meds:  acetaminophen 1,000 mg Oral TID   chlorproMAZINE 25 mg Intramuscular Once   dexamethasone 4 mg Intravenous Q6H   insulin regular 0-7 Units Subcutaneous Q6H   valsartan 160 mg Oral Q24H       PRN Meds:.acetaminophen  •  chlorproMAZINE  •  dextrose  •  dextrose  •  enalaprilat  •  glucagon (human recombinant)  •  magnesium sulfate **OR** magnesium sulfate **OR** magnesium sulfate  •  ondansetron  •  oxyCODONE  •  oxyCODONE-acetaminophen  •  potassium chloride  •  potassium phosphate infusion greater than 15 mMoles **OR** potassium phosphate infusion greater than 15 mMoles **OR** potassium phosphate **OR** sodium phosphate IVPB **OR** sodium phosphate IVPB **OR** sodium phosphate IVPB  •  promethazine  •  sodium chloride  •  Insert peripheral IV **AND** sodium chloride    WBC   Date Value Ref Range Status   01/02/2018 29.25 (H) 3.50 - 10.80 10*3/mm3 Final     RBC   Date Value Ref Range Status   01/02/2018 4.38 4.20 - 5.76 10*6/mm3 Final       Patient Active Problem List    Diagnosis   • Hypertension [I10]   • Closed fracture of occipital bone [S02.119A]   • SDH (subdural hematoma) [I62.00]   • SAH (subarachnoid hemorrhage) [I60.9]   • Fall due to ice or snow [W00.9XXA]   • Blunt head trauma due to fall [S09.8XXA]   • Traumatic subarachnoid hemorrhage with loss of consciousness of 30 minutes or less [S06.6X1A]     PE:  Small laceration to the back of head which is stable. Eye swelling is improved. CRANIAL NERVES:  Cranial nerve II:  Visual fields are full to confrontation.  Cranial nerves III, IV and VI:  PERRLADC.  Extraocular movements are  intact.  Nystagmus is not present.  Cranial nerve V:  Facial sensation is intact to light touch.  Cranial nerve VII:  Muscles of facial expression reveal no asymmetry.  Cranial nerve VIII:  Hearing is intact to finger rub bilaterally.  Cranial nerves IX and X:  Palate elevates symmetrically.  Cranial nerve XI:  Shoulder shrug is intact.  Cranial nerve XII:  Tongue is midline without evidence of atrophy or fasciculation.    A/A/O/C    Assessment/Plan   F/u CT head shows large left frontal contusion and occipital fx. He is neurologically intact. His ecchymosis is stable, his eye swelling has improved.He has a small laceration on the back of his head which is stable, he can shower and wash hair today. PT will eval patient today. He has developed singultus and we will treat with thorazine, IM and PO. Would plan for d/c tomorrow. Will re-eval in am.    Nichelle Veloz PA-C

## 2018-01-03 NOTE — PROGRESS NOTES
Continued Stay Note  [facility]     Patient Name: Kt Davis  MRN: [id number]  Today's Date: 1/3/2018    Admit Date: 1/1/2018          Discharge Plan       01/03/18 1608    Case Management/Social Work Plan    Plan Home    Patient/Family In Agreement With Plan yes    Additional Comments Case mgt f/u. PT eval noted, ambulated 600ft independently, no equipment needs. Speech eval noted, will await any further recommendations. His plan is to return home, he and wife are retired, wife is home to assist as needed.. No d/c needs identifed.              Discharge Codes     None        Expected Discharge Date and Time     Expected Discharge Date Expected Discharge Time    Jan 4, 2018             Sonja C Kellerman, RN

## 2018-01-03 NOTE — PLAN OF CARE
Problem: Patient Care Overview (Adult)  Goal: Plan of Care Review  Outcome: Ongoing (interventions implemented as appropriate)   01/03/18 1436   Coping/Psychosocial Response Interventions   Plan Of Care Reviewed With patient   Outcome Evaluation   Outcome Summary/Follow up Plan Pt ambulated 600 feet and is independent with all functional mobility. Pt has no needs for skilled PT intervention and anticipates discharge home tomorrow. May re-consult if new needs arise       Problem: Inpatient Physical Therapy  Goal: Transfer Training Goal 1 LTG- PT  Outcome: Outcome(s) achieved Date Met: 01/03/18 01/03/18 1436   Transfer Training PT LTG   Transfer Training PT LTG, Date Established 01/03/18   Transfer Training PT LTG, Time to Achieve 3 days   Transfer Training PT LTG, Activity Type sit to stand/stand to sit   Transfer Training PT LTG, [place] Level independent   Transfer Training PT LTG, Date Goal Reviewed 01/03/18   Transfer Training PT LTG, Outcome goal met     Goal: Gait Training Goal LTG- PT  Outcome: Outcome(s) achieved Date Met: 01/03/18 01/03/18 1436   Gait Training PT LTG   Gait Training Goal PT LTG, Date Established 01/03/18   Gait Training Goal PT LTG, Time to Achieve 3 days   Gait Training Goal PT LTG, [place] Level independent   Gait Training Goal PT LTG, Distance to Achieve 600 feet   Gait Training Goal PT LTG, Date Goal Reviewed 01/03/18   Gait Training Goal PT LTG, Outcome goal met

## 2018-01-04 ENCOUNTER — TELEPHONE (OUTPATIENT)
Dept: NEUROSURGERY | Facility: CLINIC | Age: 63
End: 2018-01-04

## 2018-01-04 VITALS
HEIGHT: 69 IN | BODY MASS INDEX: 27.4 KG/M2 | TEMPERATURE: 98.8 F | HEART RATE: 88 BPM | WEIGHT: 185 LBS | SYSTOLIC BLOOD PRESSURE: 127 MMHG | DIASTOLIC BLOOD PRESSURE: 74 MMHG | RESPIRATION RATE: 18 BRPM | OXYGEN SATURATION: 98 %

## 2018-01-04 DIAGNOSIS — I60.9 SAH (SUBARACHNOID HEMORRHAGE) (HCC): Primary | ICD-10-CM

## 2018-01-04 LAB
GLUCOSE BLDC GLUCOMTR-MCNC: 135 MG/DL (ref 70–130)
GLUCOSE BLDC GLUCOMTR-MCNC: 144 MG/DL (ref 70–130)

## 2018-01-04 PROCEDURE — 99239 HOSP IP/OBS DSCHRG MGMT >30: CPT | Performed by: NURSE PRACTITIONER

## 2018-01-04 PROCEDURE — 82962 GLUCOSE BLOOD TEST: CPT

## 2018-01-04 PROCEDURE — 25010000002 DEXAMETHASONE PER 1 MG: Performed by: EMERGENCY MEDICINE

## 2018-01-04 RX ORDER — CHLORPROMAZINE HYDROCHLORIDE 25 MG/1
25 TABLET, FILM COATED ORAL 3 TIMES DAILY PRN
Qty: 30 TABLET | Refills: 0 | Status: SHIPPED | OUTPATIENT
Start: 2018-01-04 | End: 2018-01-29

## 2018-01-04 RX ORDER — DEXAMETHASONE 4 MG/1
4 TABLET ORAL 4 TIMES DAILY
Qty: 56 TABLET | Refills: 0 | Status: SHIPPED | OUTPATIENT
Start: 2018-01-04 | End: 2018-01-09

## 2018-01-04 RX ADMIN — DEXAMETHASONE SODIUM PHOSPHATE 4 MG: 4 INJECTION, SOLUTION INTRAMUSCULAR; INTRAVENOUS at 03:00

## 2018-01-04 RX ADMIN — VALSARTAN 160 MG: 160 TABLET, FILM COATED ORAL at 08:13

## 2018-01-04 RX ADMIN — DEXAMETHASONE SODIUM PHOSPHATE 4 MG: 4 INJECTION, SOLUTION INTRAMUSCULAR; INTRAVENOUS at 08:13

## 2018-01-04 RX ADMIN — MUPIROCIN: 20 OINTMENT TOPICAL at 08:13

## 2018-01-04 RX ADMIN — ACETAMINOPHEN 1000 MG: 500 TABLET ORAL at 08:13

## 2018-01-04 NOTE — PLAN OF CARE
Problem: Patient Care Overview (Adult)  Goal: Plan of Care Review  Outcome: Ongoing (interventions implemented as appropriate)      Problem: Pain, Acute (Adult)  Goal: Identify Related Risk Factors and Signs and Symptoms  Outcome: Ongoing (interventions implemented as appropriate)   01/04/18 0124   Pain, Acute   Related Risk Factors (Acute Pain) patient perception;knowledge deficit

## 2018-01-04 NOTE — PAYOR COMM NOTE
"Magaly Lorenzo RN Utilization Review [phone]  Fax# [phone]  Ref# [id number]    Kt Davis (62 y.o. Male)     Date of Birth Social Security Number Address Home Phone MRN    1955  [address] [place] [address] [phone] [id number]    [employer] Marital Status          None        Admission Date Admission Type Admitting Provider Attending Provider Department, Room/Bed    1/1/18 Emergency Tarun Hernandez MD  [address], S[address]/1    Discharge Date Discharge Disposition Discharge Destination        1/4/2018 Home or Self Care Home            Attending Provider: (none)    Allergies:  No Known Allergies    Isolation:  None   Infection:  None   Code Status:  FULL    Ht:  175.3 cm (69\")   Wt:  83.9 kg (185 lb)    Admission Cmt:  None   Principal Problem:  Closed fracture of occipital bone [S02.119A]                 Active Insurance as of 1/1/2018     Primary Coverage     Payor Plan Insurance Group Employer/Plan Group    [facility] [employer] [facility] [employer] [facility] [id number]     Payor Plan Address Payor Plan Phone Number Effective From Effective To    [address] [phone] 1/1/2016     [place] [address]       Subscriber Name Subscriber Birth Date Member ID       KT DAVIS 1955 [id number]                 Emergency Contacts      (Rel.) Home Phone Work Phone Mobile Phone    Shena Davis (Spouse) [phone] -- --            Discharge Summary     No notes of this type exist for this encounter.        Discharge Order     Start     Ordered    01/04/18 1033  Discharge patient  Once     Expected Discharge Date:  01/04/18    Discharge Disposition:  Home or Self Care        01/04/18 1035          "

## 2018-01-04 NOTE — DISCHARGE SUMMARY
[facility] Medicine Services  DISCHARGE SUMMARY    Patient Name: Kt Davis  : 1955  MRN: [id number]    Date of Admission: 2018  Date of Discharge:  2018  Primary Care Physician: No Known Provider    Consults     No orders found from 12/3/2017 to 2018.        Hospital Course     Presenting Problem:   Traumatic subarachnoid hemorrhage with loss of consciousness of 30 minutes or less, initial encounter [S06.6X1A]  Traumatic subarachnoid hemorrhage with loss of consciousness of 30 minutes or less, initial encounter [S06.6X1A]    Active Hospital Problems (** Indicates Principal Problem)    Diagnosis Date Noted   • **Closed fracture of occipital bone [S02.119A] 2018   • Hypertension [I10] 2018   • SDH (subdural hematoma) [I62.00] 2018   • SAH (subarachnoid hemorrhage) [I60.9] 2018   • Fall due to ice or snow [W00.9XXA] 2018   • Blunt head trauma due to fall [S09.8XXA] 2018   • Traumatic subarachnoid hemorrhage with loss of consciousness of 30 minutes or less [S06.6X1A] 2018      Resolved Hospital Problems    Diagnosis Date Noted Date Resolved   No resolved problems to display.          Hospital Course:  Kt Davis is a 62 y.o. male with a PMH of HTN and remote upper GI bleed who was out walking his dog on the morning of 17, when he fell and hit his head after slipping on ice.  He was found by his neighbors and did lose consciousness.  He was noted to be very confused and repeating himself.  He was brought to the ED for evaluation and was found to have a non-displaced occipital bone fracture, traumatic CAH, and SDH.  He was evaluated by Dr. Bautista with neurosurgery and was admitted to the ICU for close monitoring due to the possibility of cerebral edema.  He was monitored closely and was stabilized and sent to the hospital floor for continued observation.  He developed the hiccups during this hospitalization and responded  well to thorazine.  He was also started on dexamethasone by NS as treatment for potential edema.  He is now medically stable and ready to be discharged home.  He will follow up closely with Dr. Bautista at the beginning of next week with a repeat CT scan.  He will be sent home with RX for thorazine and dexamethasone.  He will be set up with a pcp at the time of discharge as well.             Day of Discharge     HPI:   Resting comfortably in bed this morning with no new complaints.  Anxious to go home. Wife at bedside with multiple questions, which I answered to the best of my ability.     Review of Systems  Gen- No fevers, chills  CV- No chest pain, palpitations  Resp- No cough, dyspnea  GI- No N/V/D, abd pain      Otherwise ROS is negative except as mentioned in the HPI.    Vital Signs:   Temp:  [98.4 °F (36.9 °C)-98.8 °F (37.1 °C)] 98.8 °F (37.1 °C)  Heart Rate:  [] 88  Resp:  [16-18] 18  BP: (127-136)/(70-79) 127/74     Physical Exam:    Constitutional: No acute distress, awake, alert, resting comfortably in bed  HENT: NCAT, mucous membranes moist  Respiratory: Clear to auscultation bilaterally, respiratory effort normal   Cardiovascular: RRR, no murmurs, rubs, or gallops, palpable pedal pulses bilaterally  Gastrointestinal: Positive bowel sounds, soft, nontender, nondistended  Musculoskeletal: No bilateral ankle edema  Psychiatric: Appropriate affect, cooperative  Neurologic: Oriented x 3, strength symmetric in all extremities, Cranial Nerves grossly intact to confrontation, speech clear  Skin: No rashes, left orbital ecchymosis noted    Pertinent  and/or Most Recent Results       Results from last 7 days  Lab Units 01/02/18  0503 01/01/18  1308   WBC 10*3/mm3 29.25* 26.94*   HEMOGLOBIN g/dL 14.6 15.1   HEMATOCRIT % 42.6 43.5   PLATELETS 10*3/mm3 167 193   SODIUM mmol/L 137  137 140   POTASSIUM mmol/L 4.2  4.2 3.6   CHLORIDE mmol/L 104  104 106   CO2 mmol/L 24.0  24.0 25.0   BUN mg/dL 14  14 12    CREATININE mg/dL 1.00  1.00 1.00   GLUCOSE mg/dL 154*  154* 161*   CALCIUM mg/dL 9.3  9.3 9.2       Results from last 7 days  Lab Units 01/02/18  0503 01/01/18  1308   BILIRUBIN mg/dL 0.6 0.5   ALK PHOS U/L 56 69   ALT (SGPT) U/L 39 47*   AST (SGOT) U/L 34* 43*   PROTIME Seconds  --  11.1   INR   --  1.02   APTT seconds  --  <24.0*           Invalid input(s): TG, LDLREALC    Results from last 7 days  Lab Units 01/01/18  1308   HEMOGLOBIN A1C % 5.30     Brief Urine Lab Results  (Last result in the past 365 days)      Color   Clarity   Blood   Leuk Est   Nitrite   Protein   CREAT   Urine HCG        01/01/18 1846 Yellow Clear Negative Negative Negative 30 mg/dL (1+)(A)               Microbiology Results Abnormal     None          Imaging Results (all)     Procedure Component Value Units Date/Time    CT Head Without Contrast [[id number]] Collected:  01/01/18 1152     Updated:  01/01/18 1524    Narrative:       EXAMINATION: CT HEAD WO CONTRAST-      INDICATION: Fall.     TECHNIQUE: Axial CT of the head without intravenous contrast  administration.     The radiation dose reduction device was turned on for each scan per the  ALARA (As Low as Reasonably Achievable) protocol.     COMPARISON: None.     FINDINGS: No midline shift is identified. Ventricles and sulci appear  mildly decreased for expected of a patient of this age concerning for  potential early cerebral edema pattern, however, basal cisterns are  patent. Scattered subarachnoid hemorrhage within the bifrontal and  bilateral temporal lobes along with hyperattenuation along the bifrontal  convexities and parafalcine region anteriorly measuring up to 8 mm in  maximum thickness consistent with acute subdural hematoma. Minimal high  attenuation adjacent to the confluence of the sinuses along with  heterogeneous signal attenuation right cerebellar hemisphere posteriorly  concerning for subtle intraparenchymal contusion. Fourth ventricle and  foramen magnum widely  patent. Globes and orbits unremarkable. Visualized  paranasal sinuses and mastoid air cells are grossly clear and well  pneumatized. Irregular lucency extending through the occipital bone to  the level of the foramen magnum concerning for nondisplaced fracture in  the setting of acute trauma without depressed fragment.       Impression:       1. Nondisplaced nondepressed fracture of the occipital bone midline  extending to the level of the foramen magnum posterior margin.  2. Coup-Contrecoup type injury with questionable minimal  intraparenchymal contusion at the midline cerebellar region confluence  of sinuses and more significant anterior opposite contrecoup  subarachnoid and acute subdural hematomas noted anteriorly bifrontal  convexities and in parafalcine location.  3. Ventricles and sulci within the anterior and middle cranial fossae  appear somewhat decreased in overall appearance although may be due to  significant subarachnoid hemorrhage although raises suspicion for early  cerebral edema pattern in the setting of acute trauma.     D:  01/01/2018  E:  01/01/2018     This report was finalized on 1/1/2018 3:22 PM by Dr. José Miguel Aleman.       CT Cervical Spine Without Contrast [[id number]] Collected:  01/01/18 1143     Updated:  01/01/18 1824    Narrative:       EXAMINATION: CT CERVICAL SPINE WO CONTRAST-      INDICATION: Fall, AMS.      TECHNIQUE: CT cervical spine without intravenous contrast  administration.     The radiation dose reduction device was turned on for each scan per the  ALARA (As Low as Reasonably Achievable) protocol.     COMPARISON: None.     FINDINGS: There is flattening of the normal cervical lordosis which may  represent muscle spasm and/or patient positioning without focal  subluxation identified. Vertebral body heights are preserved with  intervertebral disc height loss of the mid and lower cervical segments  consistent with degenerative change. Facets are well aligned.  No  evidence for  discrete fracture is identified.  Lateral masses of C1 are  well-seated on C2. Dens is normal in appearance. Partially imaged  nondisplaced fracture posterior ring of the foramen magnum identified.  Axial datasets evaluation without paraspinal hematoma or soft tissue  abnormality of concern.       Impression:       1. Partially imaged occipital bone fracture extending to the posterior  margin of the foramen magnum without displacement.  2. No evidence for acute fracture or subluxation of the cervical spine  otherwise identified.     D:  01/01/2018  E:  01/01/2018     This report was finalized on 1/1/2018 6:22 PM by Dr. José Miguel Aleman.       XR Chest 1 View [[id number]] Collected:  01/01/18 1656     Updated:  01/03/18 0824    Narrative:       EXAMINATION: XR CHEST 1 VW- 01/01/2018     INDICATION: S06.3B7E-Ormxfjrrp subarachnoid hemorrhage with loss of  consciousness of 30 minutes or less, initial encounter;  S02.119A-Unspecified fracture of occiput, initial encounter for closed  fracture; S06.9X9A-Unspecified intracranial injury with loss of  consciousness of unspecified duration, initial encounter;  W19.XXXA-Unspecified fall, initial encounter; I62.00-N      COMPARISON: Chest x-ray 09/15/2015     FINDINGS: Cardiac silhouette within normal limits. Pulmonary vascularity  within normal limits. Chronic lung changes including bibasilar  atelectasis and/or scarring greatest on the left similar to prior  without focal consolidation. No pneumothorax or significant pleural  effusion.           Impression:       Chronic lung changes greatest left lung base likely  atelectasis and/or scarring as are similar to prior without focal  consolidation.     D:  01/02/2018  E:  01/02/2018     This report was finalized on 1/3/2018 8:22 AM by Dr. José Miguel Aleman.       CT Head Without Contrast [[id number]] Collected:  01/02/18 1039     Updated:  01/03/18 0900    Narrative:       EXAMINATION: CT HEAD WO CONTRAST-01/02/2018:      INDICATION:  NEURO DEFICIT, ACUTE SINGLE, STABLE OR PARTLY RESOLVED.         TECHNIQUE: 5 mm unenhanced images through the brain.     The radiation dose reduction device was turned on for each scan per the  ALARA (As Low as Reasonably Achievable) protocol.     COMPARISON: 01/01/2018.     FINDINGS: Previous history indicates nondisplaced occipital fracture,  frontal subarachnoid and subdural hemorrhage, Coup contrecoup injury.     Today's study again shows nondisplaced posterior midline occipital  fracture extending to the margin of the foramen magnum. Bony structures  elsewhere appear intact. Soft tissue images appear to show the patient's  left frontal parenchymal hemorrhage as mildly and diffusely increased,  with mildly increased adjacent edema. Subdural component of the  hemorrhage is actually somewhat smaller. Trace parafalcine hemorrhage is  again noted, some of which has migrated posteriorly. Mild diffuse  bilateral subarachnoid hemorrhage is mildly increased, now fairly  uniformly distributed throughout the sulci. There is a small new area of  patchy hemorrhage, almost petechial-type hemorrhage extending over a 1  cm area in the superior right frontal lobe. No new parenchymal  hemorrhage is seen elsewhere. Ventricles remain normal in size. There is  no new extra-axial collection.        Impression:       1.  Mildly and uniformly increased left frontal parenchymal hemorrhage,  and associated mildly increased edema.   2.  Decreased frontal subdural hemorrhage, some of which now extends  along the posterior falx.   3.  More diffuse pattern of subarachnoid hemorrhage, previously mostly  in the frontotemporal regions, now with mild diffuse subarachnoid  hemorrhage involving most of the frontal and parietal sulci.  4.  Small new petechial-typed hemorrhage of the right frontal lobe.  5.  Nondisplaced occipital midline fracture unchanged.     D:  01/02/2018  E:  01/02/2018     This report was finalized on 1/3/2018 8:58 AM by   Tarun Nascimento MD.       CT Head Without Contrast [[id number]] Collected:  01/03/18 1031     Updated:  01/03/18 1031    Narrative:       EXAMINATION: CT HEAD WO CONTRAST-      INDICATION: Headache.     TECHNIQUE: 5 mm unenhanced images through the brain.     The radiation dose reduction device was turned on for each scan per the  ALARA (As Low as Reasonably Achievable) protocol.     COMPARISON: 01/02/2018 head CT scan.     FINDINGS: Note is again made of patient's nondisplaced midline occipital  fracture. Calvarium elsewhere appears intact. Paranasal sinuses and  mastoids appear clear. Soft tissue images show stable appearance of the  patient's primarily left-sided foraminal contusion and associated  hemorrhage. Anterior parafalcine hemorrhage appears stable. The more  diffuse posterior parafalcine hemorrhage is largely resolved. The  diffuse subarachnoid hemorrhage seen on yesterday's study is much more  subtle, minimal in extent. Lateral ventricles remain normal in size. No  definite intraventricular hemorrhage is appreciated. Trace hemorrhage in  the right frontal lobe, resembling petechial hemorrhage, is less dense  and less extensive today. There is no evidence of new edema, infarct or  new hemorrhage elsewhere.       Impression:       Stable appearance of patient's primarily left frontal lobe  contusion and associated hemorrhage. Decreasing subdural and  subarachnoid hemorrhage elsewhere. No new or progressive intracranial  abnormality compared to yesterday's exam.     D:  01/03/2018  E:  01/03/2018                    Discharge Details      Kt Davis   Home Medication Instructions JOHN:[id number]    Printed on:01/04/18 1647   Medication Information                      chlorproMAZINE (THORAZINE) 25 MG tablet  Take 1 tablet by mouth 3 (Three) Times a Day As Needed for Nausea.             dexamethasone (DECADRON) 4 MG tablet  Take 1 tablet by mouth 4 (Four) Times a Day for 14 days.             valsartan  (DIOVAN) 160 MG tablet  Take 160 mg by mouth Daily.                   Discharge Disposition:  Home or Self Care    Discharge Diet:  Diet Instructions     Diet: Regular; Thin       Discharge Diet:  Regular   Fluid Consistency:  Thin                 Discharge Activity:   Activity Instructions     Activity as Tolerated                       Future Appointments  Date Time Provider Department Center   1/9/2018 10:30 AM [facility] CT 1 BH [facility] CT SO [facility]   1/9/2018 12:20 PM Kt Bautista MD [facility] None       Additional Instructions for the Follow-ups that You Need to Schedule     Discharge Follow-up with PCP    As directed    Follow Up Details:  please arrange new patient follow up with [facility]           Discharge Follow-up with Specified Provider: Neurosurgery    As directed    To:  Neurosurgery    Follow Up Details:  monday or tuesday with follow up CT scan                     Time Spent on Discharge:  45 min    MK Guo  01/04/18  4:49 PM

## 2018-01-09 ENCOUNTER — HOSPITAL ENCOUNTER (OUTPATIENT)
Dept: CT IMAGING | Facility: HOSPITAL | Age: 63
Discharge: HOME | End: 2018-01-09
Admitting: PHYSICIAN ASSISTANT

## 2018-01-09 ENCOUNTER — OFFICE VISIT (OUTPATIENT)
Dept: NEUROSURGERY | Facility: CLINIC | Age: 63
End: 2018-01-09

## 2018-01-09 VITALS
HEIGHT: 69 IN | TEMPERATURE: 97.7 F | SYSTOLIC BLOOD PRESSURE: 110 MMHG | WEIGHT: 176.8 LBS | DIASTOLIC BLOOD PRESSURE: 75 MMHG | BODY MASS INDEX: 26.19 KG/M2

## 2018-01-09 DIAGNOSIS — S09.8XXD BLUNT HEAD TRAUMA, SUBSEQUENT ENCOUNTER: ICD-10-CM

## 2018-01-09 DIAGNOSIS — I60.9 SAH (SUBARACHNOID HEMORRHAGE) (HCC): ICD-10-CM

## 2018-01-09 DIAGNOSIS — I60.9 SAH (SUBARACHNOID HEMORRHAGE) (HCC): Primary | ICD-10-CM

## 2018-01-09 PROCEDURE — 99213 OFFICE O/P EST LOW 20 MIN: CPT | Performed by: NEUROLOGICAL SURGERY

## 2018-01-09 PROCEDURE — 70450 CT HEAD/BRAIN W/O DYE: CPT

## 2018-01-09 RX ORDER — DEXAMETHASONE 4 MG/1
4 TABLET ORAL 3 TIMES DAILY
Qty: 42 TABLET | Refills: 0 | Status: SHIPPED | OUTPATIENT
Start: 2018-01-09 | End: 2018-01-23

## 2018-01-09 RX ORDER — DEXAMETHASONE 4 MG/1
4 TABLET ORAL 3 TIMES DAILY
Qty: 42 TABLET | Refills: 0
Start: 2018-01-09 | End: 2018-01-09

## 2018-01-09 NOTE — PATIENT INSTRUCTIONS
Call Dr. Bautista on a Monday or Tuesday with an update.   Ask for [place],  and leave a message for  Dr. Bautista.  He will call you back at the end of the day as soon as he can.     [phone]

## 2018-01-09 NOTE — PROGRESS NOTES
Kt Davis  1955  [id number]                       CURRENT WORKING DIAGNOSIS:  [Bifrontal contusion ]         MEDICAL HISTORY SINCE LAST ENCOUNTER:  [He is shown some improvement yet still has some cognitive dysfunction as well as personality issues.  He has very poor appetite.  He reports for CT scan.  He continues with dexamethasone 4 mg 4 times a day. ]           Past Medical History:   Diagnosis Date   • GI bleed    • Hypertension    • Ulcer               Past Surgical History:   Procedure Laterality Date   • APPENDECTOMY  1999   • COLONOSCOPY     • URETHRAL DILATION                Family History   Problem Relation Age of Onset   • Cancer Mother      colon   • Parkinsonism Father    • Heart disease Father               Social History     Social History   • Marital status:      Spouse name: N/A   • Number of children: N/A   • Years of education: N/A     Occupational History   • Not on file.     Social History Main Topics   • Smoking status: Never Smoker   • Smokeless tobacco: Never Used   • Alcohol use No   • Drug use: No   • Sexual activity: Defer     Other Topics Concern   • Not on file     Social History Narrative            No Known Allergies           Current Outpatient Prescriptions:   •  chlorproMAZINE (THORAZINE) 25 MG tablet, Take 1 tablet by mouth 3 (Three) Times a Day As Needed for Nausea., Disp: 30 tablet, Rfl: 0  •  dexamethasone (DECADRON) 4 MG tablet, Take 1 tablet by mouth 3 (Three) Times a Day for 14 days., Disp: 42 tablet, Rfl: 0  •  valsartan (DIOVAN) 160 MG tablet, Take 160 mg by mouth Daily., Disp: , Rfl:          Review of Systems   Constitutional: Positive for appetite change. Negative for activity change, chills, diaphoresis, fatigue, fever and unexpected weight change.   HENT: Positive for tinnitus. Negative for congestion, dental problem, drooling, ear discharge, ear pain, facial swelling, hearing loss, mouth sores, nosebleeds, postnasal drip, rhinorrhea, sinus pressure,  "sneezing, sore throat, trouble swallowing and voice change.    Eyes: Positive for visual disturbance. Negative for photophobia, pain, discharge, redness and itching.   Respiratory: Negative for apnea, cough, choking, chest tightness, shortness of breath, wheezing and stridor.    Cardiovascular: Negative for chest pain, palpitations and leg swelling.   Gastrointestinal: Negative for abdominal distention, abdominal pain, anal bleeding, blood in stool, constipation, diarrhea, nausea, rectal pain and vomiting.   Endocrine: Negative for cold intolerance, heat intolerance, polydipsia, polyphagia and polyuria.   Genitourinary: Negative for decreased urine volume, difficulty urinating, dysuria, enuresis, flank pain, frequency, genital sores, hematuria and urgency.   Musculoskeletal: Negative for arthralgias, back pain, gait problem, joint swelling, myalgias, neck pain and neck stiffness.   Skin: Negative for color change, pallor, rash and wound.   Allergic/Immunologic: Negative for environmental allergies, food allergies and immunocompromised state.   Neurological: Positive for dizziness and headaches. Negative for tremors, seizures, syncope, facial asymmetry, speech difficulty, weakness, light-headedness and numbness.   Hematological: Negative for adenopathy. Does not bruise/bleed easily.   Psychiatric/Behavioral: Negative for agitation, behavioral problems, confusion, decreased concentration, dysphoric mood, hallucinations, self-injury, sleep disturbance and suicidal ideas. The patient is not nervous/anxious and is not hyperactive.                Vitals:    01/09/18 1101   BP: 110/75   BP Location: Right arm   Patient Position: Sitting   Cuff Size: Adult   Temp: 97.7 °F (36.5 °C)   Weight: 80.2 kg (176 lb 12.8 oz)   Height: 175.3 cm (69.02\")               EXAMINATION: The periorbital ecchymosis on the left is slowly receding.  I do not find evidence of weakness or sensory loss.  He does have some issues with his vision; " tinnitus and intermittent vertigo.            MEDICAL DECISION MAKING: CT scan shows hemorrhagic contusion and a left frontal lobe; non-hemorrhagic contusion on the right.  It is stable with very slight improvement.           ASSESSMENT/DISPOSITION: I've suggested reducing dexamethasone to 4 mg by mouth 3 times a day; repeating his CT scan in 2 weeks.  I will see him on the same day.  I would suggest he may benefit from [facility] brain injury outpatient therapy.  Also we'll referred to ophthalmology in 2 weeks.              I APPRECIATE THE OPPORTUNITY OF THIS REFERRAL. PLEASE CALL IF ANY  QUESTIONS [phone]    Scribed for Kt Bautista MD by Radhika Du CMA. 1/9/2018  11:30 AM     I have read and concur with the information provided by the scribe.  Kt Bautista MD

## 2018-01-12 ENCOUNTER — TELEPHONE (OUTPATIENT)
Dept: NEUROSURGERY | Facility: CLINIC | Age: 63
End: 2018-01-12

## 2018-01-12 NOTE — TELEPHONE ENCOUNTER
This is likely secondary to the high dose of steroids patient has been on and should improve as we taper him off.  I talked to his wife about encouraging him to drink water and avoid caffeinated beverages. Sugar-free gum may help stimulate some saliva production during the day.  He has not had much of an appetite since being home.  I suggested even trying some protein shakes to get some nutrients in his system

## 2018-01-15 NOTE — TELEPHONE ENCOUNTER
Pt's wife called and stated her her  now has thrush from all the medication he is taking. I have spoken with Natalie RAMIREZ. And she asked me to order Nystatin solution. I have routed to her for signature and will call the pt's wife and let her know this medicine will be sent to their pharmacy, encounter closed.     His wife also mentioned that he is dizzy upon standing, she mentioned that he has consumed a lot of mountain dew recently and thinks he might be dehydrated. I advised her to hold off on the mountain dew as it will increase dehydration and feed the thrush. I encouraged her to have the pt drink at least 8- 8 oz glasses of plain water for the next three days. I have spoken with Natalie RAMIREZ about this and she agreed. She will call us if his condition worsens. Encounter closed.

## 2018-01-22 ENCOUNTER — TELEPHONE (OUTPATIENT)
Dept: NEUROSURGERY | Facility: CLINIC | Age: 63
End: 2018-01-22

## 2018-01-22 NOTE — TELEPHONE ENCOUNTER
Provider: Michele    Caller: pt's wife  Time of call:  11:24  Phone #:  [phone]  Surgery:  N/A  Surgery Date:  N/A  Last visit: 01/09/18     Next visit: 01/29/18           Reason for call:Pt was seen on 01/09/18 after hospital admit for SAH. He currently does not have an active PCP since retiring from [facility] 2 months ago. His wife called and stated the following:         Pt has been taking Valsartan and his BP typically runs around 130/70. His wife states his BP is now running around 99/55 and he is complaining of increased weakness. She has held off on giving him his BP medicine this morning. I asked her if the pt was still experiencing dizziness since her last call and she stated he isn't but he is still not drinking enough water. He has a scheduled appointment with a new PCP in April, but his wife would like to know what to do in the meantime.

## 2018-01-22 NOTE — TELEPHONE ENCOUNTER
Spoke with Shena   Here in the office, it was 110/75 (on 1/9/18) which was low for him. He was still taking his valsartan.    He is taking his 4mg TID of the steroids at this point.  He has been complaining of fatigue without focal weakness or dizziness. He has to sit down after walking, and is taking more frequent naps.    She held his valsartan today and his bp was 102/59    I advised her to hold the valsartan and monitor his BP twice to three times a day for the next week until their appointment with Dr. Bautista. If the numbers start to climb, they will call back this week. She agreed    (They are supposed to see a new PCP in April, but could get in a few weeks earlier if they need to)

## 2018-01-29 ENCOUNTER — HOSPITAL ENCOUNTER (OUTPATIENT)
Dept: CT IMAGING | Facility: HOSPITAL | Age: 63
Discharge: HOME | End: 2018-01-29
Attending: NEUROLOGICAL SURGERY | Admitting: NEUROLOGICAL SURGERY

## 2018-01-29 ENCOUNTER — OFFICE VISIT (OUTPATIENT)
Dept: NEUROSURGERY | Facility: CLINIC | Age: 63
End: 2018-01-29

## 2018-01-29 VITALS
DIASTOLIC BLOOD PRESSURE: 62 MMHG | SYSTOLIC BLOOD PRESSURE: 122 MMHG | HEIGHT: 69 IN | BODY MASS INDEX: 26.36 KG/M2 | TEMPERATURE: 97.3 F | WEIGHT: 178 LBS

## 2018-01-29 DIAGNOSIS — S09.8XXD BLUNT HEAD TRAUMA, SUBSEQUENT ENCOUNTER: ICD-10-CM

## 2018-01-29 DIAGNOSIS — H83.2X9 VESTIBULAR DISEQUILIBRIUM, UNSPECIFIED LATERALITY: ICD-10-CM

## 2018-01-29 DIAGNOSIS — I60.9 SAH (SUBARACHNOID HEMORRHAGE) (HCC): Primary | ICD-10-CM

## 2018-01-29 PROCEDURE — 99212 OFFICE O/P EST SF 10 MIN: CPT | Performed by: NEUROLOGICAL SURGERY

## 2018-01-29 PROCEDURE — 70450 CT HEAD/BRAIN W/O DYE: CPT

## 2018-01-29 RX ORDER — DEXAMETHASONE 4 MG/1
4 TABLET ORAL 2 TIMES DAILY WITH MEALS
Qty: 30 TABLET | Refills: 0 | Status: SHIPPED | OUTPATIENT
Start: 2018-01-29 | End: 2018-01-29 | Stop reason: SDUPTHER

## 2018-01-29 RX ORDER — DEXAMETHASONE 4 MG/1
4 TABLET ORAL 2 TIMES DAILY WITH MEALS
Qty: 30 TABLET | Refills: 0
Start: 2018-01-29 | End: 2018-02-26

## 2018-01-29 RX ORDER — DEXAMETHASONE 4 MG/1
4 TABLET ORAL 3 TIMES DAILY
COMMUNITY
End: 2018-01-29

## 2018-01-29 NOTE — PATIENT INSTRUCTIONS
Dexamethasone :   Wean down- begin taking 2 tabs per day for 3 days, then one per day until Monday. Then stop.

## 2018-01-29 NOTE — PROGRESS NOTES
Kt MYRICK Ryan  1955  [id number]                       CURRENT WORKING DIAGNOSIS:  [CEREBRAL CONTUSION ]         MEDICAL HISTORY SINCE LAST ENCOUNTER:  [REPORTING FOR FOLLOW UP. OVERALL BETTER. MODERATE VERTIGO; LEFT EAR STOPPED UP. LESS EMOTIONAL LIABILITY. CT SCAN IMPROVED  ]           Past Medical History:   Diagnosis Date   • GI bleed    • Hypertension    • Ulcer               Past Surgical History:   Procedure Laterality Date   • APPENDECTOMY  1999   • COLONOSCOPY     • URETHRAL DILATION                Family History   Problem Relation Age of Onset   • Cancer Mother      colon   • Parkinsonism Father    • Heart disease Father               Social History     Social History   • Marital status:      Spouse name: N/A   • Number of children: N/A   • Years of education: N/A     Occupational History   • Not on file.     Social History Main Topics   • Smoking status: Never Smoker   • Smokeless tobacco: Never Used   • Alcohol use No   • Drug use: No   • Sexual activity: Defer     Other Topics Concern   • Not on file     Social History Narrative            No Known Allergies           Current Outpatient Prescriptions:   •  dexamethasone (DECADRON) 4 MG tablet, Take 4 mg by mouth 3 (Three) Times a Day., Disp: , Rfl:   •  valsartan (DIOVAN) 160 MG tablet, Take 160 mg by mouth Daily., Disp: , Rfl:          Review of Systems   Unable to perform ROS: Other (feet swelling)   Constitutional: Negative for activity change, appetite change, chills, diaphoresis, fatigue, fever and unexpected weight change.   HENT: Positive for congestion. Negative for dental problem, drooling, ear discharge, ear pain, facial swelling, hearing loss, mouth sores, nosebleeds, postnasal drip, rhinorrhea, sinus pressure, sneezing, sore throat, tinnitus, trouble swallowing and voice change.    Eyes: Negative for photophobia, pain, discharge, redness, itching and visual disturbance.   Respiratory: Negative for apnea, cough, choking, chest  "tightness, shortness of breath, wheezing and stridor.    Cardiovascular: Negative for chest pain, palpitations and leg swelling.   Gastrointestinal: Negative for abdominal distention, abdominal pain, anal bleeding, blood in stool, constipation, diarrhea, nausea, rectal pain and vomiting.   Endocrine: Negative for cold intolerance, heat intolerance, polydipsia, polyphagia and polyuria.   Genitourinary: Negative for decreased urine volume, difficulty urinating, dysuria, enuresis, flank pain, frequency, genital sores, hematuria and urgency.   Musculoskeletal: Negative for arthralgias, back pain, gait problem, joint swelling, myalgias, neck pain and neck stiffness.   Skin: Negative for color change, pallor, rash and wound.   Allergic/Immunologic: Negative for environmental allergies, food allergies and immunocompromised state.   Neurological: Positive for dizziness and weakness. Negative for tremors, seizures, syncope, facial asymmetry, speech difficulty, light-headedness, numbness and headaches.   Hematological: Negative for adenopathy. Does not bruise/bleed easily.   Psychiatric/Behavioral: Negative for agitation, behavioral problems, confusion, decreased concentration, dysphoric mood, hallucinations, self-injury, sleep disturbance and suicidal ideas. The patient is not nervous/anxious and is not hyperactive.    All other systems reviewed and are negative.              Vitals:    01/29/18 1004   BP: 122/62   BP Location: Right arm   Patient Position: Sitting   Temp: 97.3 °F (36.3 °C)   TempSrc: Temporal Artery    Weight: 80.7 kg (178 lb)   Height: 175.3 cm (69.02\")               EXAMINATION: [ALERT,BRIGHT NO FOCALITY ]            MEDICAL DECISION MAKING: [ CEREBRAL CONTUSION IMPROVING]           ASSESSMENT/DISPOSITION: [PT FOR INNER EAR EVAL; [facility] BRAIN INJURY OUTPATIENT, REDUCE STEROIDS MRI IN 3 WEEKS ]              I APPRECIATE THE OPPORTUNITY OF THIS REFERRAL. PLEASE CALL IF ANY       QUESTIONS " [phone]  Scribed for Kt Bautista MD by Radhika Du CMA. 1/29/2018  10:32 AM

## 2018-02-22 ENCOUNTER — HOSPITAL ENCOUNTER (OUTPATIENT)
Dept: MRI IMAGING | Facility: HOSPITAL | Age: 63
Discharge: HOME | End: 2018-02-22
Attending: NEUROLOGICAL SURGERY | Admitting: NEUROLOGICAL SURGERY

## 2018-02-22 PROCEDURE — A9577 INJ MULTIHANCE: HCPCS | Performed by: NEUROLOGICAL SURGERY

## 2018-02-22 PROCEDURE — 0 GADOBENATE DIMEGLUMINE 529 MG/ML SOLUTION: Performed by: NEUROLOGICAL SURGERY

## 2018-02-22 PROCEDURE — 70553 MRI BRAIN STEM W/O & W/DYE: CPT

## 2018-02-22 PROCEDURE — 82565 ASSAY OF CREATININE: CPT

## 2018-02-22 RX ADMIN — GADOBENATE DIMEGLUMINE 15 ML: 529 INJECTION, SOLUTION INTRAVENOUS at 10:45

## 2018-02-23 LAB — CREAT BLDA-MCNC: 1 MG/DL (ref 0.6–1.3)

## 2018-02-26 ENCOUNTER — OFFICE VISIT (OUTPATIENT)
Dept: NEUROSURGERY | Facility: CLINIC | Age: 63
End: 2018-02-26

## 2018-02-26 VITALS
SYSTOLIC BLOOD PRESSURE: 124 MMHG | TEMPERATURE: 97.6 F | HEIGHT: 69 IN | DIASTOLIC BLOOD PRESSURE: 78 MMHG | BODY MASS INDEX: 25.92 KG/M2 | WEIGHT: 175 LBS

## 2018-02-26 DIAGNOSIS — S06.332D: Primary | ICD-10-CM

## 2018-02-26 PROCEDURE — 99212 OFFICE O/P EST SF 10 MIN: CPT | Performed by: NEUROLOGICAL SURGERY

## 2018-02-26 NOTE — PROGRESS NOTES
Kt Davis  1955  [id number]                       CURRENT WORKING DIAGNOSIS:   Bifrontal cerebral contusions         MEDICAL HISTORY SINCE LAST ENCOUNTER:  This 63-year-old male is been followed with bifrontal cerebral contusions secondary to an accident.  He is shown significant improvement.  He has been going to [facility] and has achieved at least 90% of his cognitive function.  He reports today for follow-up with MRI.  Denies headache, speech issues or weakness.            Past Medical History:   Diagnosis Date   • GI bleed    • Hypertension    • Ulcer               Past Surgical History:   Procedure Laterality Date   • APPENDECTOMY  1999   • COLONOSCOPY     • URETHRAL DILATION                Family History   Problem Relation Age of Onset   • Cancer Mother      colon   • Parkinsonism Father    • Heart disease Father               Social History     Social History   • Marital status:      Spouse name: N/A   • Number of children: N/A   • Years of education: N/A     Occupational History   • Not on file.     Social History Main Topics   • Smoking status: Never Smoker   • Smokeless tobacco: Never Used   • Alcohol use No   • Drug use: No   • Sexual activity: Defer     Other Topics Concern   • Not on file     Social History Narrative            No Known Allergies         No current outpatient prescriptions on file.         Review of Systems   Unable to perform ROS: Other (feet swelling)   Constitutional: Negative for activity change, appetite change, chills, diaphoresis, fatigue, fever and unexpected weight change.   HENT: Positive for congestion. Negative for dental problem, drooling, ear discharge, ear pain, facial swelling, hearing loss, mouth sores, nosebleeds, postnasal drip, rhinorrhea, sinus pressure, sneezing, sore throat, tinnitus, trouble swallowing and voice change.    Eyes: Negative for photophobia, pain, discharge, redness, itching and visual disturbance.   Respiratory:  "Negative for apnea, cough, choking, chest tightness, shortness of breath, wheezing and stridor.    Cardiovascular: Negative for chest pain, palpitations and leg swelling.   Gastrointestinal: Negative for abdominal distention, abdominal pain, anal bleeding, blood in stool, constipation, diarrhea, nausea, rectal pain and vomiting.   Endocrine: Negative for cold intolerance, heat intolerance, polydipsia, polyphagia and polyuria.   Genitourinary: Negative for decreased urine volume, difficulty urinating, dysuria, enuresis, flank pain, frequency, genital sores, hematuria and urgency.   Musculoskeletal: Negative for arthralgias, back pain, gait problem, joint swelling, myalgias, neck pain and neck stiffness.   Skin: Negative for color change, pallor, rash and wound.   Allergic/Immunologic: Negative for environmental allergies, food allergies and immunocompromised state.   Neurological: Positive for dizziness and weakness. Negative for tremors, seizures, syncope, facial asymmetry, speech difficulty, light-headedness, numbness and headaches.   Hematological: Negative for adenopathy. Does not bruise/bleed easily.   Psychiatric/Behavioral: Negative for agitation, behavioral problems, confusion, decreased concentration, dysphoric mood, hallucinations, self-injury, sleep disturbance and suicidal ideas. The patient is not nervous/anxious and is not hyperactive.    All other systems reviewed and are negative.              Vitals:    02/26/18 1540   BP: 124/78   BP Location: Right arm   Patient Position: Sitting   Cuff Size: Adult   Temp: 97.6 °F (36.4 °C)   TempSrc: Temporal Artery    Weight: 79.4 kg (175 lb)   Height: 175.3 cm (69.02\")               EXAMINATION: No weakness sensory loss or reflex asymmetry noted            MEDICAL DECISION MAKING: MRI shows continued resolution of the frontal contusions.  The marked edema has resolved.           ASSESSMENT/DISPOSITION: Unsteady improvement both radiographically and clinically.  " I do not think to continued neurosurgical follow-up is indicated or warranted at this time.  I have discussed this in detail with the patient and his wife.  I'm happy to see him should have any symptoms and may have been told to call me on as-needed basis.              I APPRECIATE THE OPPORTUNITY OF THIS REFERRAL. PLEASE CALL IF ANY       QUESTIONS [phone]

## 2018-04-02 ENCOUNTER — OFFICE VISIT (OUTPATIENT)
Dept: FAMILY MEDICINE CLINIC | Facility: CLINIC | Age: 63
End: 2018-04-02

## 2018-04-02 VITALS
BODY MASS INDEX: 27.13 KG/M2 | SYSTOLIC BLOOD PRESSURE: 126 MMHG | HEIGHT: 69 IN | WEIGHT: 183.2 LBS | OXYGEN SATURATION: 98 % | DIASTOLIC BLOOD PRESSURE: 82 MMHG | RESPIRATION RATE: 18 BRPM | HEART RATE: 89 BPM

## 2018-04-02 DIAGNOSIS — Z00.00 HEALTHCARE MAINTENANCE: Primary | ICD-10-CM

## 2018-04-02 DIAGNOSIS — Z87.19 HISTORY OF ESOPHAGEAL STRICTURE: ICD-10-CM

## 2018-04-02 LAB
25(OH)D3 SERPL-MCNC: 17.6 NG/ML
ALBUMIN SERPL-MCNC: 3.8 G/DL (ref 3.2–4.8)
ALBUMIN/GLOB SERPL: 1.6 G/DL (ref 1.5–2.5)
ALP SERPL-CCNC: 69 U/L (ref 25–100)
ALT SERPL W P-5'-P-CCNC: 16 U/L (ref 7–40)
ANION GAP SERPL CALCULATED.3IONS-SCNC: 11 MMOL/L (ref 3–11)
ARTICHOKE IGE QN: 105 MG/DL (ref 0–130)
AST SERPL-CCNC: 19 U/L (ref 0–33)
BASOPHILS # BLD AUTO: 0.04 10*3/MM3 (ref 0–0.2)
BASOPHILS NFR BLD AUTO: 0.5 % (ref 0–1)
BILIRUB BLD-MCNC: NEGATIVE MG/DL
BILIRUB SERPL-MCNC: 0.3 MG/DL (ref 0.3–1.2)
BUN BLD-MCNC: 9 MG/DL (ref 9–23)
BUN/CREAT SERPL: 10 (ref 7–25)
CALCIUM SPEC-SCNC: 9.2 MG/DL (ref 8.7–10.4)
CHLORIDE SERPL-SCNC: 105 MMOL/L (ref 99–109)
CHOLEST SERPL-MCNC: 182 MG/DL (ref 0–200)
CLARITY, POC: CLEAR
CO2 SERPL-SCNC: 26 MMOL/L (ref 20–31)
COLOR UR: YELLOW
CREAT BLD-MCNC: 0.9 MG/DL (ref 0.6–1.3)
CRP SERPL-MCNC: 0.43 MG/DL (ref 0–1)
DEPRECATED RDW RBC AUTO: 50.1 FL (ref 37–54)
EOSINOPHIL # BLD AUTO: 0.11 10*3/MM3 (ref 0–0.3)
EOSINOPHIL NFR BLD AUTO: 1.3 % (ref 0–3)
ERYTHROCYTE [DISTWIDTH] IN BLOOD BY AUTOMATED COUNT: 14.3 % (ref 11.3–14.5)
GFR SERPL CREATININE-BSD FRML MDRD: 85 ML/MIN/1.73
GLOBULIN UR ELPH-MCNC: 2.4 GM/DL
GLUCOSE BLD-MCNC: 93 MG/DL (ref 70–100)
GLUCOSE UR STRIP-MCNC: NEGATIVE MG/DL
HBA1C MFR BLD: 4.9 % (ref 4.8–5.6)
HCT VFR BLD AUTO: 37.7 % (ref 38.9–50.9)
HCV AB SER DONR QL: NORMAL
HDLC SERPL-MCNC: 57 MG/DL (ref 40–60)
HGB BLD-MCNC: 11.7 G/DL (ref 13.1–17.5)
HIV1+2 AB SER QL: NORMAL
IMM GRANULOCYTES # BLD: 0.01 10*3/MM3 (ref 0–0.03)
IMM GRANULOCYTES NFR BLD: 0.1 % (ref 0–0.6)
KETONES UR QL: NEGATIVE
LEUKOCYTE EST, POC: NEGATIVE
LYMPHOCYTES # BLD AUTO: 1.96 10*3/MM3 (ref 0.6–4.8)
LYMPHOCYTES NFR BLD AUTO: 24 % (ref 24–44)
MCH RBC QN AUTO: 29.8 PG (ref 27–31)
MCHC RBC AUTO-ENTMCNC: 31 G/DL (ref 32–36)
MCV RBC AUTO: 95.9 FL (ref 80–99)
MONOCYTES # BLD AUTO: 1.02 10*3/MM3 (ref 0–1)
MONOCYTES NFR BLD AUTO: 12.5 % (ref 0–12)
NEUTROPHILS # BLD AUTO: 5.01 10*3/MM3 (ref 1.5–8.3)
NEUTROPHILS NFR BLD AUTO: 61.6 % (ref 41–71)
NITRITE UR-MCNC: NEGATIVE MG/ML
PH UR: 7 [PH] (ref 5–8)
PLATELET # BLD AUTO: 243 10*3/MM3 (ref 150–450)
PMV BLD AUTO: 9.9 FL (ref 6–12)
POTASSIUM BLD-SCNC: 4.2 MMOL/L (ref 3.5–5.5)
PROT SERPL-MCNC: 6.2 G/DL (ref 5.7–8.2)
PROT UR STRIP-MCNC: NEGATIVE MG/DL
PSA SERPL-MCNC: 0.25 NG/ML (ref 0–4)
RBC # BLD AUTO: 3.93 10*6/MM3 (ref 4.2–5.76)
RBC # UR STRIP: NEGATIVE /UL
SODIUM BLD-SCNC: 142 MMOL/L (ref 132–146)
SP GR UR: 1.01 (ref 1–1.03)
T4 FREE SERPL-MCNC: 0.76 NG/DL (ref 0.89–1.76)
TRIGL SERPL-MCNC: 244 MG/DL (ref 0–150)
TSH SERPL DL<=0.05 MIU/L-ACNC: 0.84 MIU/ML (ref 0.35–5.35)
URATE SERPL-MCNC: 5.8 MG/DL (ref 3.7–9.2)
UROBILINOGEN UR QL: NORMAL
WBC NRBC COR # BLD: 8.15 10*3/MM3 (ref 3.5–10.8)

## 2018-04-02 PROCEDURE — 84439 ASSAY OF FREE THYROXINE: CPT | Performed by: FAMILY MEDICINE

## 2018-04-02 PROCEDURE — 83036 HEMOGLOBIN GLYCOSYLATED A1C: CPT | Performed by: FAMILY MEDICINE

## 2018-04-02 PROCEDURE — 86803 HEPATITIS C AB TEST: CPT | Performed by: FAMILY MEDICINE

## 2018-04-02 PROCEDURE — 84550 ASSAY OF BLOOD/URIC ACID: CPT | Performed by: FAMILY MEDICINE

## 2018-04-02 PROCEDURE — 81003 URINALYSIS AUTO W/O SCOPE: CPT | Performed by: FAMILY MEDICINE

## 2018-04-02 PROCEDURE — 86140 C-REACTIVE PROTEIN: CPT | Performed by: FAMILY MEDICINE

## 2018-04-02 PROCEDURE — G0103 PSA SCREENING: HCPCS | Performed by: FAMILY MEDICINE

## 2018-04-02 PROCEDURE — G0432 EIA HIV-1/HIV-2 SCREEN: HCPCS | Performed by: FAMILY MEDICINE

## 2018-04-02 PROCEDURE — 80061 LIPID PANEL: CPT | Performed by: FAMILY MEDICINE

## 2018-04-02 PROCEDURE — 82306 VITAMIN D 25 HYDROXY: CPT | Performed by: FAMILY MEDICINE

## 2018-04-02 PROCEDURE — 85025 COMPLETE CBC W/AUTO DIFF WBC: CPT | Performed by: FAMILY MEDICINE

## 2018-04-02 PROCEDURE — 80053 COMPREHEN METABOLIC PANEL: CPT | Performed by: FAMILY MEDICINE

## 2018-04-02 PROCEDURE — 36415 COLL VENOUS BLD VENIPUNCTURE: CPT | Performed by: FAMILY MEDICINE

## 2018-04-02 PROCEDURE — 99386 PREV VISIT NEW AGE 40-64: CPT | Performed by: FAMILY MEDICINE

## 2018-04-02 PROCEDURE — 84443 ASSAY THYROID STIM HORMONE: CPT | Performed by: FAMILY MEDICINE

## 2018-04-02 NOTE — PROGRESS NOTES
Subjective   Kt Davis is a 63 y.o. male.     History of Present Illness   New patient to the office.  He is here for his annual fasting wellness evaluation.  He describes a traumatic brain injury January 1, 2018 with ongoing neurosurgical evaluations and brain imaging.  He is current on his immunizations.    Review of Systems   Constitutional: Negative.    HENT: Negative.    Eyes: Positive for visual disturbance (plans to see eye doctor).   Respiratory: Negative.    Cardiovascular: Negative.    Gastrointestinal: Negative.    Endocrine: Negative.    Genitourinary: Negative.    Musculoskeletal: Negative.    Skin: Negative.    Allergic/Immunologic: Negative.    Neurological: Negative.    Hematological: Negative.    Psychiatric/Behavioral: Negative.        Objective   Physical Exam   Constitutional: He is oriented to person, place, and time. He appears well-developed and well-nourished. He is cooperative.   HENT:   Head: Normocephalic and atraumatic.   Right Ear: Hearing and external ear normal.   Left Ear: Hearing and external ear normal.   Nose: Nose normal.   Mouth/Throat: Uvula is midline, oropharynx is clear and moist and mucous membranes are normal.   Eyes: Conjunctivae and EOM are normal. Pupils are equal, round, and reactive to light. No scleral icterus.   Neck: Trachea normal and normal range of motion. Neck supple. No JVD present. Carotid bruit is not present. No thyromegaly present.   Cardiovascular: Normal rate, regular rhythm, normal heart sounds and intact distal pulses.    Pulmonary/Chest: Effort normal and breath sounds normal.   Abdominal: Soft. Bowel sounds are normal. There is no hepatosplenomegaly. There is no tenderness.   Musculoskeletal: Normal range of motion.   Lymphadenopathy:     He has no cervical adenopathy.   Neurological: He is alert and oriented to person, place, and time. He has normal strength and normal reflexes. No sensory deficit. Gait normal.   Skin: Skin is warm and dry.    Psychiatric: He has a normal mood and affect. His speech is normal and behavior is normal. Judgment and thought content normal. Cognition and memory are normal.   Nursing note and vitals reviewed.      Assessment/Plan   Diagnoses and all orders for this visit:    Healthcare maintenance  -     POC Urinalysis Dipstick, Automated  -     Comprehensive Metabolic Panel  -     CBC & Differential  -     Lipid Panel  -     TSH  -     T4, Free  -     Uric Acid  -     C-reactive Protein  -     PSA Screen  -     HIV-1 / O / 2 Ag / Antibody 4th Generation  -     Hepatitis C Antibody  -     Hemoglobin A1c  -     Vitamin D 25 Hydroxy    History of esophageal stricture  -     Ambulatory Referral to Gastroenterology    The patient is here for a health maintenance visit.  Currently, the patient consumes a healthy diet and has an adequate exercise regimen. Screening lab work is ordered.  Immunizations are reported as current.  Advice and education is given regarding nutrition, aerobic exercise, routine dental evaluations, routine eye exams, reproductive health, cardiovascular risk reduction, sunscreen use, self skin examination (annual dermatology evaluations) and seat belt use (general overall safety).  Further recommendations after lab evaluation.  Annual wellness evaluations recommended.

## 2018-04-05 NOTE — PROGRESS NOTES
Your vitamin D level is low.  Please take vitamin D 2000 IU daily.  Otherwise the rest of your lab results are all stable.  Please see the gastroenterologist as recommended for your anemia.  If you have any questions or concerns, please don't hesitate to contact us.  Thank you.

## 2018-10-18 ENCOUNTER — FLU SHOT (OUTPATIENT)
Dept: FAMILY MEDICINE CLINIC | Facility: CLINIC | Age: 63
End: 2018-10-18

## 2018-10-18 DIAGNOSIS — Z23 NEED FOR INFLUENZA VACCINATION: ICD-10-CM

## 2018-10-18 PROCEDURE — 90686 IIV4 VACC NO PRSV 0.5 ML IM: CPT | Performed by: FAMILY MEDICINE

## 2018-10-18 PROCEDURE — 90471 IMMUNIZATION ADMIN: CPT | Performed by: FAMILY MEDICINE

## 2020-02-25 ENCOUNTER — OFFICE VISIT (OUTPATIENT)
Dept: FAMILY MEDICINE CLINIC | Facility: CLINIC | Age: 65
End: 2020-02-25

## 2020-02-25 VITALS
HEART RATE: 90 BPM | BODY MASS INDEX: 29.8 KG/M2 | OXYGEN SATURATION: 95 % | TEMPERATURE: 98 F | RESPIRATION RATE: 20 BRPM | SYSTOLIC BLOOD PRESSURE: 142 MMHG | WEIGHT: 196.6 LBS | DIASTOLIC BLOOD PRESSURE: 78 MMHG | HEIGHT: 68 IN

## 2020-02-25 DIAGNOSIS — K40.90 INGUINAL HERNIA OF LEFT SIDE WITHOUT OBSTRUCTION OR GANGRENE: ICD-10-CM

## 2020-02-25 DIAGNOSIS — Z00.00 MEDICARE ANNUAL WELLNESS VISIT, INITIAL: Primary | ICD-10-CM

## 2020-02-25 DIAGNOSIS — I10 ESSENTIAL HYPERTENSION: ICD-10-CM

## 2020-02-25 DIAGNOSIS — Z12.5 ENCOUNTER FOR SCREENING FOR MALIGNANT NEOPLASM OF PROSTATE: ICD-10-CM

## 2020-02-25 PROCEDURE — 80053 COMPREHEN METABOLIC PANEL: CPT | Performed by: FAMILY MEDICINE

## 2020-02-25 PROCEDURE — 83036 HEMOGLOBIN GLYCOSYLATED A1C: CPT | Performed by: FAMILY MEDICINE

## 2020-02-25 PROCEDURE — G0438 PPPS, INITIAL VISIT: HCPCS | Performed by: FAMILY MEDICINE

## 2020-02-25 PROCEDURE — 85025 COMPLETE CBC W/AUTO DIFF WBC: CPT | Performed by: FAMILY MEDICINE

## 2020-02-25 PROCEDURE — G0103 PSA SCREENING: HCPCS | Performed by: FAMILY MEDICINE

## 2020-02-25 PROCEDURE — 80061 LIPID PANEL: CPT | Performed by: FAMILY MEDICINE

## 2020-02-25 PROCEDURE — 84443 ASSAY THYROID STIM HORMONE: CPT | Performed by: FAMILY MEDICINE

## 2020-02-25 RX ORDER — ACETAMINOPHEN 160 MG
1000 TABLET,DISINTEGRATING ORAL DAILY
COMMUNITY
End: 2021-11-09

## 2020-02-25 RX ORDER — VALSARTAN 160 MG/1
160 TABLET ORAL DAILY
Qty: 90 TABLET | Refills: 1 | Status: SHIPPED | OUTPATIENT
Start: 2020-02-25 | End: 2020-08-25

## 2020-02-25 NOTE — PROGRESS NOTES
Kt Davis is a 65 y.o. male who presents today to establish care andcomplete annual exam.    Chief Complaint   Patient presents with   • Establish Care   • Annual Exam        HPI     Review of Systems     PHQ-9 Depression Screening  Little interest or pleasure in doing things? 0   Feeling down, depressed, or hopeless? 0   Trouble falling or staying asleep, or sleeping too much?     Feeling tired or having little energy?     Poor appetite or overeating?     Feeling bad about yourself - or that you are a failure or have let yourself or your family down?     Trouble concentrating on things, such as reading the newspaper or watching television?     Moving or speaking so slowly that other people could have noticed? Or the opposite - being so fidgety or restless that you have been moving around a lot more than usual?     Thoughts that you would be better off dead, or of hurting yourself in some way?     PHQ-9 Total Score 0   If you checked off any problems, how difficult have these problems made it for you to do your work, take care of things at home, or get along with other people?         Past Medical History:   Diagnosis Date   • History of esophageal stricture 2015   • History of gastric ulcer    • Hypertension    • Occipital fracture (CMS/HCC) 2018   • Post-traumatic brain syndrome 2018        Past Surgical History:   Procedure Laterality Date   • APPENDECTOMY  1999   • COLONOSCOPY  2015   • ESOPHAGEAL DILATATION  2015   • TONSILLECTOMY AND ADENOIDECTOMY  1959   • UPPER GASTROINTESTINAL ENDOSCOPY  2015        Family History   Problem Relation Age of Onset   • Colon cancer Mother    • Parkinsonism Father    • Heart disease Father    • Allergies Daughter    • Asthma Daughter    • No Known Problems Son    • Other Maternal Grandmother         Unknown   • Heart block Maternal Grandfather    • Macular degeneration Paternal Grandmother    • Diverticulitis Paternal Grandfather    • Emphysema Paternal Grandfather      "    Social History     Socioeconomic History   • Marital status:      Spouse name: Shena   • Number of children: 2   • Years of education: College   • Highest education level: Not on file   Occupational History   • Occupation:      Employer: [employer]   Tobacco Use   • Smoking status: Never Smoker   • Smokeless tobacco: Never Used   Substance and Sexual Activity   • Alcohol use: No   • Drug use: No   • Sexual activity: Yes     Partners: Female     Comment:         Current Outpatient Medications on File Prior to Visit   Medication Sig Dispense Refill   • cholecalciferol (VITAMIN D3) 25 MCG (1000 UT) tablet Take 1,000 Units by mouth Daily.     • Multiple Vitamins-Minerals (MULTIVITAL PO) Take  by mouth.       No current facility-administered medications on file prior to visit.        No Known Allergies     Visit Vitals  /78   Pulse 90   Temp 98 °F (36.7 °C) (Temporal)   Resp 20   Ht 172.7 cm (68\")   Wt 89.2 kg (196 lb 9.6 oz)   SpO2 95%   BMI 29.89 kg/m²      Body mass index is 29.89 kg/m².    Physical Exam     Results for orders placed or performed in visit on 04/02/18   Comprehensive Metabolic Panel   Result Value Ref Range    Glucose 93 70 - 100 mg/dL    BUN 9 9 - 23 mg/dL    Creatinine 0.90 0.60 - 1.30 mg/dL    Sodium 142 132 - 146 mmol/L    Potassium 4.2 3.5 - 5.5 mmol/L    Chloride 105 99 - 109 mmol/L    CO2 26.0 20.0 - 31.0 mmol/L    Calcium 9.2 8.7 - 10.4 mg/dL    Total Protein 6.2 5.7 - 8.2 g/dL    Albumin 3.80 3.20 - 4.80 g/dL    ALT (SGPT) 16 7 - 40 U/L    AST (SGOT) 19 0 - 33 U/L    Alkaline Phosphatase 69 25 - 100 U/L    Total Bilirubin 0.3 0.3 - 1.2 mg/dL    eGFR Non African Amer 85 >60 mL/min/1.73    Globulin 2.4 gm/dL    A/G Ratio 1.6 1.5 - 2.5 g/dL    BUN/Creatinine Ratio 10.0 7.0 - 25.0    Anion Gap 11.0 3.0 - 11.0 mmol/L   Lipid Panel   Result Value Ref Range    Total Cholesterol 182 0 - 200 mg/dL    Triglycerides 244 (H) 0 - 150 mg/dL    HDL Cholesterol 57 " 40 - 60 mg/dL    LDL Cholesterol  105 0 - 130 mg/dL   TSH   Result Value Ref Range    TSH 0.836 0.350 - 5.350 mIU/mL   T4, Free   Result Value Ref Range    Free T4 0.76 (L) 0.89 - 1.76 ng/dL   Uric Acid   Result Value Ref Range    Uric Acid 5.8 3.7 - 9.2 mg/dL   C-reactive Protein   Result Value Ref Range    C-Reactive Protein 0.43 0.00 - 1.00 mg/dL   PSA Screen   Result Value Ref Range    PSA 0.250 0.000 - 4.000 ng/mL   HIV-1 / O / 2 Ag / Antibody 4th Generation   Result Value Ref Range    HIV-1/ HIV-2 Non-Reactive Non-Reactive   Hepatitis C Antibody   Result Value Ref Range    Hepatitis C Ab Non-Reactive Non-Reactive   Hemoglobin A1c   Result Value Ref Range    Hemoglobin A1C 4.90 4.80 - 5.60 %   Vitamin D 25 Hydroxy   Result Value Ref Range    25 Hydroxy, Vitamin D 17.6 ng/ml   CBC Auto Differential   Result Value Ref Range    WBC 8.15 3.50 - 10.80 10*3/mm3    RBC 3.93 (L) 4.20 - 5.76 10*6/mm3    Hemoglobin 11.7 (L) 13.1 - 17.5 g/dL    Hematocrit 37.7 (L) 38.9 - 50.9 %    MCV 95.9 80.0 - 99.0 fL    MCH 29.8 27.0 - 31.0 pg    MCHC 31.0 (L) 32.0 - 36.0 g/dL    RDW 14.3 11.3 - 14.5 %    RDW-SD 50.1 37.0 - 54.0 fl    MPV 9.9 6.0 - 12.0 fL    Platelets 243 150 - 450 10*3/mm3    Neutrophil % 61.6 41.0 - 71.0 %    Lymphocyte % 24.0 24.0 - 44.0 %    Monocyte % 12.5 (H) 0.0 - 12.0 %    Eosinophil % 1.3 0.0 - 3.0 %    Basophil % 0.5 0.0 - 1.0 %    Immature Grans % 0.1 0.0 - 0.6 %    Neutrophils, Absolute 5.01 1.50 - 8.30 10*3/mm3    Lymphocytes, Absolute 1.96 0.60 - 4.80 10*3/mm3    Monocytes, Absolute 1.02 (H) 0.00 - 1.00 10*3/mm3    Eosinophils, Absolute 0.11 0.00 - 0.30 10*3/mm3    Basophils, Absolute 0.04 0.00 - 0.20 10*3/mm3    Immature Grans, Absolute 0.01 0.00 - 0.03 10*3/mm3   POC Urinalysis Dipstick, Automated   Result Value Ref Range    Color Yellow Yellow, Straw, Dark Yellow, [place]    Clarity, UA Clear Clear    Glucose, UA Negative Negative, 1000 mg/dL (3+) mg/dL    Bilirubin Negative Negative    Ketones, UA  Negative Negative    Specific Gravity  1.015 1.005 - 1.030    Blood, UA Negative Negative    pH, Urine 7.0 5.0 - 8.0    Protein, POC Negative Negative mg/dL    Urobilinogen, UA Normal Normal    Leukocytes Negative Negative    Nitrite, UA Negative Negative        Problems Addressed this Visit     None          No follow-ups on file.    Parts of this office note have been dictated by voice recognition software. Grammatical and/or spelling errors may be present.     Tani Vazquez MD  2/25/2020      Answers for HPI/ROS submitted by the patient on 2/18/2020   What is the primary reason for your visit?: Physical

## 2020-02-25 NOTE — PROGRESS NOTES
The ABCs of the Annual Wellness Visit  Initial Medicare Wellness Visit    Chief Complaint   Patient presents with   • Establish Care   • Annual Exam       Subjective   History of Present Illness:  Kt Davis is a 65 y.o. male who presents for an Initial Medicare Wellness Visit.    The Pt states that he noticed the development of what he believes is a hernia in his left inguinal region. He reports that he had a URI and suffered from several episodes of coughing that seemed to cause the development of the mass in his left groin. He denies pain and erythema and states that he is able to reduce the mass and it is not indurated. He reports that the mass in his left inguinal region has become more prominent and is out more than it stays reduced. He reports there may be mild enlargement over the last 6 months, but otherwise no change in Sx.    HEALTH RISK ASSESSMENT    Recent Hospitalizations:  No hospitalization(s) within the last year.    Current Medical Providers:  Patient Care Team:  Provider, No Known as PCP - General  Provider, No Known as PCP - Family Medicine    Smoking Status:  Social History     Tobacco Use   Smoking Status Never Smoker   Smokeless Tobacco Never Used       Alcohol Consumption:  Social History     Substance and Sexual Activity   Alcohol Use No       Depression Screen:   PHQ-2/PHQ-9 Depression Screening 2/25/2020   Little interest or pleasure in doing things 0   Feeling down, depressed, or hopeless 0   Total Score 0       Fall Risk Screen:  STEADI Fall Risk Assessment has not been completed.    Health Habits and Functional and Cognitive Screening:  Functional & Cognitive Status 2/25/2020   Do you have difficulty preparing food and eating? No   Do you have difficulty bathing yourself, getting dressed or grooming yourself? No   Do you have difficulty using the toilet? No   Do you have difficulty moving around from place to place? No   Do you have trouble with steps or getting out of a bed or a  chair? No   Current Diet Well Balanced Diet   Dental Exam Up to date   Eye Exam Not up to date   Exercise (times per week) 3 times per week   Current Exercise Activities Include Walking   Do you need help using the phone?  No   Are you deaf or do you have serious difficulty hearing?  No   Do you need help with transportation? No   Do you need help shopping? No   Do you need help preparing meals?  No   Do you need help with housework?  No   Do you need help with laundry? No   Do you need help taking your medications? No   Do you need help managing money? No   Do you ever drive or ride in a car without wearing a seat belt? No   Have you felt unusual stress, anger or loneliness in the last month? No   Who do you live with? Spouse   If you need help, do you have trouble finding someone available to you? No   Have you been bothered in the last four weeks by sexual problems? No   Do you have difficulty concentrating, remembering or making decisions? No         Does the patient have evidence of cognitive impairment? No    Asprin use counseling:Does not need ASA (and currently is not on it)    Age-appropriate Screening Schedule:  Refer to the list below for future screening recommendations based on patient's age, sex and/or medical conditions. Orders for these recommended tests are listed in the plan section. The patient has been provided with a written plan.    Health Maintenance   Topic Date Due   • ZOSTER VACCINE (2 of 2) 03/05/2021 (Originally 2/25/2020)   • TDAP/TD VACCINES (2 - Td) 01/01/2023   • COLONOSCOPY  12/02/2025   • INFLUENZA VACCINE  Completed          The following portions of the patient's history were reviewed and updated as appropriate: allergies, current medications, past family history, past medical history, past social history, past surgical history and problem list.    Outpatient Medications Prior to Visit   Medication Sig Dispense Refill   • cholecalciferol (VITAMIN D3) 25 MCG (1000 UT) tablet Take  1,000 Units by mouth Daily.     • Multiple Vitamins-Minerals (MULTIVITAL PO) Take  by mouth.       No facility-administered medications prior to visit.        Patient Active Problem List   Diagnosis   • Closed fracture of occipital bone (CMS/HCC)   • SDH (subdural hematoma) (CMS/[facility])   • SAH (subarachnoid hemorrhage) (CMS/[facility])   • Fall due to ice or snow   • Blunt head trauma due to fall   • Traumatic subarachnoid hemorrhage with loss of consciousness of 30 minutes or less (CMS/[facility])   • Hypertension   • Inguinal hernia of left side without obstruction or gangrene   • Medicare annual wellness visit, initial       Advanced Care Planning:  ACP discussion was held with the patient during this visit. Patient has an advance directive, copy requested.    Review of Systems   Constitutional: Negative for chills, diaphoresis, fatigue, fever and unexpected weight change.   HENT: Negative for congestion, postnasal drip, sinus pressure and sinus pain.    Eyes: Negative for visual disturbance.   Respiratory: Negative for cough, shortness of breath and wheezing.    Cardiovascular: Negative for chest pain, palpitations and leg swelling.   Gastrointestinal: Negative for abdominal pain, blood in stool, constipation, diarrhea, nausea and vomiting.        Left inguinal hernia   Endocrine: Negative for cold intolerance and heat intolerance.   Genitourinary: Negative for difficulty urinating.   Musculoskeletal: Negative for arthralgias and back pain.   Skin: Negative for rash.   Neurological: Negative for dizziness, light-headedness and headaches.   Psychiatric/Behavioral: Negative for suicidal ideas.       Compared to one year ago, the patient feels his physical health is the same.  Compared to one year ago, the patient feels his mental health is the same.    Reviewed chart for potential of high risk medication in the elderly: yes  Reviewed chart for potential of harmful drug interactions in the elderly:yes    Objective         Vitals:  "   02/25/20 1346   BP: 142/78   Pulse: 90   Resp: 20   Temp: 98 °F (36.7 °C)   TempSrc: Temporal   SpO2: 95%   Weight: 89.2 kg (196 lb 9.6 oz)   Height: 172.7 cm (68\")   PainSc: 0-No pain       Body mass index is 29.89 kg/m².  Discussed the patient's BMI with him. The BMI is in the acceptable range.    Physical Exam   Constitutional: He is oriented to person, place, and time. He appears well-developed and well-nourished. No distress.   HENT:   Head: Normocephalic and atraumatic.   Eyes: Pupils are equal, round, and reactive to light. EOM are normal.   Neck: Normal range of motion. Neck supple.   Cardiovascular: Normal rate, regular rhythm, normal heart sounds and intact distal pulses. Exam reveals no gallop and no friction rub.   No murmur heard.  Pulmonary/Chest: Effort normal and breath sounds normal. No stridor. No respiratory distress. He has no wheezes. He has no rales.   Abdominal: Soft. Bowel sounds are normal. He exhibits no distension and no mass. There is no tenderness. There is no rebound and no guarding. A hernia (left inguinal, reducible, non-tender, no erythema, not indurated) is present.   Musculoskeletal: Normal range of motion. He exhibits no edema.   Neurological: He is alert and oriented to person, place, and time.   Skin: Skin is warm and dry. He is not diaphoretic.   Psychiatric: He has a normal mood and affect. His behavior is normal.   Vitals reviewed.            Assessment/Plan   Medicare Risks and Personalized Health Plan  CMS Preventative Services Quick Reference  Advance Directive Discussion  Cardiovascular risk  Dementia/Memory   Depression/Dysphoria  Diabetic Lab Screening   Fall Risk  Prostate Cancer Screening     The above risks/problems have been discussed with the patient.  Pertinent information has been shared with the patient in the After Visit Summary.  Follow up plans and orders are seen below in the Assessment/Plan Section.    Diagnoses and all orders for this visit:    1. " Medicare annual wellness visit, initial (Primary)  Assessment & Plan:  The patient is here for health maintenance visit.  Currently, the patient consumes a healthy diet and has an adequate exercise regimen.  Screening lab work is ordered.  Immunizations were reviewed today.  Advice and education was given regarding nutrition, aerobic exercise, routine dental evaluations, routine eye exams, reproductive health, cardiovascular risk reduction, sunscreen use, self skin examination (annual dermatology evaluations) and seatbelt use (general overall safety).  Further recommendations will be given if needed after lab evaluation.  Annual wellness evaluation is recommended.      Orders:  -     CBC & Differential  -     Comprehensive Metabolic Panel  -     Hemoglobin A1c  -     Lipid Panel  -     TSH Rfx On Abnormal To Free T4  -     PSA Screen  -     CBC Auto Differential    2. Inguinal hernia of left side without obstruction or gangrene  Assessment & Plan:  Reducible inguinal hernia on the left side.  Patient was given referral to general surgery for further evaluation and treatment.  RTC precautions given.    Orders:  -     Ambulatory Referral to General Surgery    3. Essential hypertension  Assessment & Plan:  Hypertension is worsening.  Medication changes per orders.  Blood pressure will be reassessed at the next regular appointment.    Orders:  -     valsartan (DIOVAN) 160 MG tablet; Take 1 tablet by mouth Daily.  Dispense: 90 tablet; Refill: 1    4. Encounter for screening for malignant neoplasm of prostate   -     PSA Screen    Follow Up:  Return in about 6 months (around 8/25/2020) for Follow-up HTN.     An After Visit Summary and PPPS were given to the patient.

## 2020-02-25 NOTE — ASSESSMENT & PLAN NOTE
Reducible inguinal hernia on the left side.  Patient was given referral to general surgery for further evaluation and treatment.  RTC precautions given.

## 2020-02-25 NOTE — PATIENT INSTRUCTIONS
Medicare Wellness  Personal Prevention Plan of Service     Date of Office Visit:  2020  Encounter Provider:  Tani Vazquez MD  Place of Service:  [facility]  Patient Name: Kt Davis  :  1955    As part of the Medicare Wellness portion of your visit today, we are providing you with this personalized preventive plan of services (PPPS). This plan is based upon recommendations of the [place] Preventive Services Task Force (USPSTF) and the Advisory Committee on Immunization Practices (ACIP).    This lists the preventive care services that should be considered, and provides dates of when you are due. Items listed as completed are up-to-date and do not require any further intervention.    Health Maintenance   Topic Date Due   • Pneumococcal Vaccine Once at 65 Years Old  2019   • ZOSTER VACCINE (2 of 2) 2021 (Originally 2020)   • MEDICARE ANNUAL WELLNESS  2021   • TDAP/TD VACCINES (2 - Td) 2023   • COLONOSCOPY  2025   • HEPATITIS C SCREENING  Completed   • INFLUENZA VACCINE  Completed       Orders Placed This Encounter   Procedures   • Comprehensive Metabolic Panel   • Hemoglobin A1c   • Lipid Panel   • TSH Rfx On Abnormal To Free T4   • PSA Screen   • CBC Auto Differential   • Ambulatory Referral to General Surgery     Referral Priority:   Routine     Referral Type:   Consultation     Referral Reason:   Specialty Services Required     Requested Specialty:   General Surgery     Number of Visits Requested:   1   • CBC & Differential     Order Specific Question:   Manual Differential     Answer:   No       Return in about 6 months (around 2020) for Follow-up HTN.

## 2020-02-26 ENCOUNTER — TELEPHONE (OUTPATIENT)
Dept: FAMILY MEDICINE CLINIC | Facility: CLINIC | Age: 65
End: 2020-02-26

## 2020-02-26 LAB
ALBUMIN SERPL-MCNC: 4.4 G/DL (ref 3.5–5.2)
ALBUMIN/GLOB SERPL: 1.7 G/DL
ALP SERPL-CCNC: 73 U/L (ref 39–117)
ALT SERPL W P-5'-P-CCNC: 27 U/L (ref 1–41)
ANION GAP SERPL CALCULATED.3IONS-SCNC: 13 MMOL/L (ref 5–15)
AST SERPL-CCNC: 20 U/L (ref 1–40)
BASOPHILS # BLD AUTO: 0.05 10*3/MM3 (ref 0–0.2)
BASOPHILS NFR BLD AUTO: 0.6 % (ref 0–1.5)
BILIRUB SERPL-MCNC: 0.4 MG/DL (ref 0.2–1.2)
BUN BLD-MCNC: 12 MG/DL (ref 8–23)
BUN/CREAT SERPL: 12.5 (ref 7–25)
CALCIUM SPEC-SCNC: 10.4 MG/DL (ref 8.6–10.5)
CHLORIDE SERPL-SCNC: 102 MMOL/L (ref 98–107)
CHOLEST SERPL-MCNC: 187 MG/DL (ref 0–200)
CO2 SERPL-SCNC: 26 MMOL/L (ref 22–29)
CREAT BLD-MCNC: 0.96 MG/DL (ref 0.76–1.27)
DEPRECATED RDW RBC AUTO: 41.9 FL (ref 37–54)
EOSINOPHIL # BLD AUTO: 0.24 10*3/MM3 (ref 0–0.4)
EOSINOPHIL NFR BLD AUTO: 2.7 % (ref 0.3–6.2)
ERYTHROCYTE [DISTWIDTH] IN BLOOD BY AUTOMATED COUNT: 12 % (ref 12.3–15.4)
GFR SERPL CREATININE-BSD FRML MDRD: 78 ML/MIN/1.73
GLOBULIN UR ELPH-MCNC: 2.6 GM/DL
GLUCOSE BLD-MCNC: 81 MG/DL (ref 65–99)
HBA1C MFR BLD: 5.2 % (ref 4.8–5.6)
HCT VFR BLD AUTO: 46.9 % (ref 37.5–51)
HDLC SERPL-MCNC: 39 MG/DL (ref 40–60)
HGB BLD-MCNC: 16.3 G/DL (ref 13–17.7)
IMM GRANULOCYTES # BLD AUTO: 0.04 10*3/MM3 (ref 0–0.05)
IMM GRANULOCYTES NFR BLD AUTO: 0.4 % (ref 0–0.5)
LDLC SERPL CALC-MCNC: 102 MG/DL (ref 0–100)
LDLC/HDLC SERPL: 2.61 {RATIO}
LYMPHOCYTES # BLD AUTO: 2.35 10*3/MM3 (ref 0.7–3.1)
LYMPHOCYTES NFR BLD AUTO: 26.2 % (ref 19.6–45.3)
MCH RBC QN AUTO: 33.1 PG (ref 26.6–33)
MCHC RBC AUTO-ENTMCNC: 34.8 G/DL (ref 31.5–35.7)
MCV RBC AUTO: 95.1 FL (ref 79–97)
MONOCYTES # BLD AUTO: 1.1 10*3/MM3 (ref 0.1–0.9)
MONOCYTES NFR BLD AUTO: 12.3 % (ref 5–12)
NEUTROPHILS # BLD AUTO: 5.19 10*3/MM3 (ref 1.7–7)
NEUTROPHILS NFR BLD AUTO: 57.8 % (ref 42.7–76)
NRBC BLD AUTO-RTO: 0 /100 WBC (ref 0–0.2)
PLATELET # BLD AUTO: 217 10*3/MM3 (ref 140–450)
PMV BLD AUTO: 9.8 FL (ref 6–12)
POTASSIUM BLD-SCNC: 4.4 MMOL/L (ref 3.5–5.2)
PROT SERPL-MCNC: 7 G/DL (ref 6–8.5)
PSA SERPL-MCNC: 0.35 NG/ML (ref 0–4)
RBC # BLD AUTO: 4.93 10*6/MM3 (ref 4.14–5.8)
SODIUM BLD-SCNC: 141 MMOL/L (ref 136–145)
TRIGL SERPL-MCNC: 232 MG/DL (ref 0–150)
TSH SERPL DL<=0.05 MIU/L-ACNC: 1.82 UIU/ML (ref 0.27–4.2)
VLDLC SERPL-MCNC: 46.4 MG/DL (ref 5–40)
WBC NRBC COR # BLD: 8.97 10*3/MM3 (ref 3.4–10.8)

## 2020-02-26 NOTE — TELEPHONE ENCOUNTER
SPOKE WITH PT ABOUT RECENT LAB RESULTS. PT VOICED UNDERSTANDING. ADVISED TO CALL BACK WITH ANY QUESTIONS.

## 2020-02-26 NOTE — TELEPHONE ENCOUNTER
----- Message from Tani Vazquez MD sent at 2/26/2020  8:56 AM EST -----  Please let the patient know that labs that were drawn at previous visit were unremarkable. Patient should continue current treatment plan.  If patient has any questions they can contact me.

## 2020-06-16 ENCOUNTER — APPOINTMENT (OUTPATIENT)
Dept: PREADMISSION TESTING | Facility: HOSPITAL | Age: 65
End: 2020-06-16

## 2020-06-16 VITALS — HEIGHT: 69 IN | BODY MASS INDEX: 28.05 KG/M2 | WEIGHT: 189.4 LBS

## 2020-06-16 LAB
ANION GAP SERPL CALCULATED.3IONS-SCNC: 10 MMOL/L (ref 5–15)
BUN BLD-MCNC: 14 MG/DL (ref 8–23)
BUN/CREAT SERPL: 13.2 (ref 7–25)
CALCIUM SPEC-SCNC: 9.6 MG/DL (ref 8.6–10.5)
CHLORIDE SERPL-SCNC: 106 MMOL/L (ref 98–107)
CO2 SERPL-SCNC: 24 MMOL/L (ref 22–29)
CREAT BLD-MCNC: 1.06 MG/DL (ref 0.76–1.27)
DEPRECATED RDW RBC AUTO: 41.3 FL (ref 37–54)
ERYTHROCYTE [DISTWIDTH] IN BLOOD BY AUTOMATED COUNT: 11.8 % (ref 12.3–15.4)
GFR SERPL CREATININE-BSD FRML MDRD: 70 ML/MIN/1.73
GLUCOSE BLD-MCNC: 94 MG/DL (ref 65–99)
HCT VFR BLD AUTO: 44.8 % (ref 37.5–51)
HGB BLD-MCNC: 15.2 G/DL (ref 13–17.7)
MCH RBC QN AUTO: 32.9 PG (ref 26.6–33)
MCHC RBC AUTO-ENTMCNC: 33.9 G/DL (ref 31.5–35.7)
MCV RBC AUTO: 97 FL (ref 79–97)
PLATELET # BLD AUTO: 192 10*3/MM3 (ref 140–450)
PMV BLD AUTO: 9.2 FL (ref 6–12)
POTASSIUM BLD-SCNC: 4.6 MMOL/L (ref 3.5–5.2)
RBC # BLD AUTO: 4.62 10*6/MM3 (ref 4.14–5.8)
REF LAB TEST METHOD: NORMAL
SARS-COV-2 RNA RESP QL NAA+PROBE: NOT DETECTED
SODIUM BLD-SCNC: 140 MMOL/L (ref 136–145)
WBC NRBC COR # BLD: 7.97 10*3/MM3 (ref 3.4–10.8)

## 2020-06-16 PROCEDURE — 85027 COMPLETE CBC AUTOMATED: CPT | Performed by: SURGERY

## 2020-06-16 PROCEDURE — 93010 ELECTROCARDIOGRAM REPORT: CPT | Performed by: INTERNAL MEDICINE

## 2020-06-16 PROCEDURE — 80048 BASIC METABOLIC PNL TOTAL CA: CPT | Performed by: SURGERY

## 2020-06-16 PROCEDURE — 93005 ELECTROCARDIOGRAM TRACING: CPT

## 2020-06-16 PROCEDURE — C9803 HOPD COVID-19 SPEC COLLECT: HCPCS

## 2020-06-16 PROCEDURE — U0004 COV-19 TEST NON-CDC HGH THRU: HCPCS

## 2020-06-16 PROCEDURE — U0002 COVID-19 LAB TEST NON-CDC: HCPCS

## 2020-06-16 PROCEDURE — 36415 COLL VENOUS BLD VENIPUNCTURE: CPT

## 2020-06-18 ENCOUNTER — ANESTHESIA EVENT (OUTPATIENT)
Dept: PERIOP | Facility: HOSPITAL | Age: 65
End: 2020-06-18

## 2020-06-18 ENCOUNTER — HOSPITAL ENCOUNTER (OUTPATIENT)
Facility: HOSPITAL | Age: 65
Discharge: HOME | End: 2020-06-18
Attending: SURGERY | Admitting: SURGERY

## 2020-06-18 ENCOUNTER — ANESTHESIA (OUTPATIENT)
Dept: PERIOP | Facility: HOSPITAL | Age: 65
End: 2020-06-18

## 2020-06-18 VITALS
OXYGEN SATURATION: 92 % | SYSTOLIC BLOOD PRESSURE: 132 MMHG | RESPIRATION RATE: 20 BRPM | TEMPERATURE: 97.5 F | DIASTOLIC BLOOD PRESSURE: 81 MMHG | HEART RATE: 77 BPM

## 2020-06-18 DIAGNOSIS — K40.90 INGUINAL HERNIA: ICD-10-CM

## 2020-06-18 PROCEDURE — C1781 MESH (IMPLANTABLE): HCPCS | Performed by: SURGERY

## 2020-06-18 PROCEDURE — 25010000002 BUPRENORPHINE PER 0.1 MG: Performed by: NURSE ANESTHETIST, CERTIFIED REGISTERED

## 2020-06-18 PROCEDURE — 25010000002 DEXAMETHASONE PER 1 MG: Performed by: NURSE ANESTHETIST, CERTIFIED REGISTERED

## 2020-06-18 PROCEDURE — 25010000002 PROPOFOL 10 MG/ML EMULSION: Performed by: NURSE ANESTHETIST, CERTIFIED REGISTERED

## 2020-06-18 PROCEDURE — 25010000002 FENTANYL CITRATE (PF) 100 MCG/2ML SOLUTION: Performed by: NURSE ANESTHETIST, CERTIFIED REGISTERED

## 2020-06-18 PROCEDURE — 25010000003 CEFAZOLIN IN DEXTROSE 2-4 GM/100ML-% SOLUTION: Performed by: SURGERY

## 2020-06-18 PROCEDURE — 88302 TISSUE EXAM BY PATHOLOGIST: CPT | Performed by: SURGERY

## 2020-06-18 PROCEDURE — 25010000002 DEXAMETHASONE SODIUM PHOSPHATE 10 MG/ML SOLUTION: Performed by: NURSE ANESTHETIST, CERTIFIED REGISTERED

## 2020-06-18 DEVICE — BARD MESH
Type: IMPLANTABLE DEVICE | Site: ABDOMEN | Status: FUNCTIONAL
Brand: BARD MESH

## 2020-06-18 DEVICE — BARD SOFT MESH
Type: IMPLANTABLE DEVICE | Site: ABDOMEN | Status: FUNCTIONAL
Brand: BARD SOFT MESH

## 2020-06-18 RX ORDER — PROMETHAZINE HYDROCHLORIDE 25 MG/ML
12.5 INJECTION, SOLUTION INTRAMUSCULAR; INTRAVENOUS ONCE AS NEEDED
Status: DISCONTINUED | OUTPATIENT
Start: 2020-06-18 | End: 2020-06-18 | Stop reason: HOSPADM

## 2020-06-18 RX ORDER — PROMETHAZINE HYDROCHLORIDE 25 MG/1
25 SUPPOSITORY RECTAL ONCE AS NEEDED
Status: DISCONTINUED | OUTPATIENT
Start: 2020-06-18 | End: 2020-06-18 | Stop reason: HOSPADM

## 2020-06-18 RX ORDER — SODIUM CHLORIDE 0.9 % (FLUSH) 0.9 %
10 SYRINGE (ML) INJECTION EVERY 12 HOURS SCHEDULED
Status: DISCONTINUED | OUTPATIENT
Start: 2020-06-18 | End: 2020-06-18

## 2020-06-18 RX ORDER — MAGNESIUM HYDROXIDE 1200 MG/15ML
LIQUID ORAL AS NEEDED
Status: DISCONTINUED | OUTPATIENT
Start: 2020-06-18 | End: 2020-06-18 | Stop reason: HOSPADM

## 2020-06-18 RX ORDER — LIDOCAINE HYDROCHLORIDE 10 MG/ML
0.5 INJECTION, SOLUTION EPIDURAL; INFILTRATION; INTRACAUDAL; PERINEURAL ONCE AS NEEDED
Status: COMPLETED | OUTPATIENT
Start: 2020-06-18 | End: 2020-06-18

## 2020-06-18 RX ORDER — HYDROMORPHONE HYDROCHLORIDE 1 MG/ML
0.5 INJECTION, SOLUTION INTRAMUSCULAR; INTRAVENOUS; SUBCUTANEOUS
Status: DISCONTINUED | OUTPATIENT
Start: 2020-06-18 | End: 2020-06-18 | Stop reason: HOSPADM

## 2020-06-18 RX ORDER — PROMETHAZINE HYDROCHLORIDE 25 MG/1
25 TABLET ORAL ONCE AS NEEDED
Status: DISCONTINUED | OUTPATIENT
Start: 2020-06-18 | End: 2020-06-18 | Stop reason: HOSPADM

## 2020-06-18 RX ORDER — DEXAMETHASONE SODIUM PHOSPHATE 4 MG/ML
INJECTION, SOLUTION INTRA-ARTICULAR; INTRALESIONAL; INTRAMUSCULAR; INTRAVENOUS; SOFT TISSUE AS NEEDED
Status: DISCONTINUED | OUTPATIENT
Start: 2020-06-18 | End: 2020-06-18 | Stop reason: SURG

## 2020-06-18 RX ORDER — FAMOTIDINE 20 MG/1
20 TABLET, FILM COATED ORAL
Status: DISCONTINUED | OUTPATIENT
Start: 2020-06-18 | End: 2020-06-18 | Stop reason: HOSPADM

## 2020-06-18 RX ORDER — BUPRENORPHINE HYDROCHLORIDE 0.32 MG/ML
INJECTION INTRAMUSCULAR; INTRAVENOUS AS NEEDED
Status: DISCONTINUED | OUTPATIENT
Start: 2020-06-18 | End: 2020-06-18 | Stop reason: SURG

## 2020-06-18 RX ORDER — PROPOFOL 10 MG/ML
VIAL (ML) INTRAVENOUS AS NEEDED
Status: DISCONTINUED | OUTPATIENT
Start: 2020-06-18 | End: 2020-06-18 | Stop reason: SURG

## 2020-06-18 RX ORDER — BUPIVACAINE HYDROCHLORIDE 2.5 MG/ML
INJECTION, SOLUTION EPIDURAL; INFILTRATION; INTRACAUDAL AS NEEDED
Status: DISCONTINUED | OUTPATIENT
Start: 2020-06-18 | End: 2020-06-18 | Stop reason: SURG

## 2020-06-18 RX ORDER — LIDOCAINE HYDROCHLORIDE 10 MG/ML
INJECTION, SOLUTION EPIDURAL; INFILTRATION; INTRACAUDAL; PERINEURAL AS NEEDED
Status: DISCONTINUED | OUTPATIENT
Start: 2020-06-18 | End: 2020-06-18 | Stop reason: SURG

## 2020-06-18 RX ORDER — CEFAZOLIN SODIUM 2 G/100ML
2 INJECTION, SOLUTION INTRAVENOUS ONCE
Status: COMPLETED | OUTPATIENT
Start: 2020-06-18 | End: 2020-06-18

## 2020-06-18 RX ORDER — DEXAMETHASONE SODIUM PHOSPHATE 10 MG/ML
INJECTION, SOLUTION INTRAMUSCULAR; INTRAVENOUS AS NEEDED
Status: DISCONTINUED | OUTPATIENT
Start: 2020-06-18 | End: 2020-06-18 | Stop reason: SURG

## 2020-06-18 RX ORDER — HYDROCODONE BITARTRATE AND ACETAMINOPHEN 5; 325 MG/1; MG/1
1 TABLET ORAL EVERY 6 HOURS PRN
Qty: 15 TABLET | Refills: 0 | Status: SHIPPED | OUTPATIENT
Start: 2020-06-18 | End: 2020-08-25

## 2020-06-18 RX ORDER — SODIUM CHLORIDE, SODIUM LACTATE, POTASSIUM CHLORIDE, CALCIUM CHLORIDE 600; 310; 30; 20 MG/100ML; MG/100ML; MG/100ML; MG/100ML
9 INJECTION, SOLUTION INTRAVENOUS CONTINUOUS PRN
Status: DISCONTINUED | OUTPATIENT
Start: 2020-06-18 | End: 2020-06-18 | Stop reason: HOSPADM

## 2020-06-18 RX ORDER — FENTANYL CITRATE 50 UG/ML
INJECTION, SOLUTION INTRAMUSCULAR; INTRAVENOUS AS NEEDED
Status: DISCONTINUED | OUTPATIENT
Start: 2020-06-18 | End: 2020-06-18 | Stop reason: SURG

## 2020-06-18 RX ORDER — MIDAZOLAM HYDROCHLORIDE 1 MG/ML
1 INJECTION INTRAMUSCULAR; INTRAVENOUS
Status: DISCONTINUED | OUTPATIENT
Start: 2020-06-18 | End: 2020-06-18 | Stop reason: HOSPADM

## 2020-06-18 RX ORDER — FENTANYL CITRATE 50 UG/ML
50 INJECTION, SOLUTION INTRAMUSCULAR; INTRAVENOUS
Status: DISCONTINUED | OUTPATIENT
Start: 2020-06-18 | End: 2020-06-18 | Stop reason: HOSPADM

## 2020-06-18 RX ORDER — ONDANSETRON 2 MG/ML
4 INJECTION INTRAMUSCULAR; INTRAVENOUS ONCE AS NEEDED
Status: DISCONTINUED | OUTPATIENT
Start: 2020-06-18 | End: 2020-06-18 | Stop reason: HOSPADM

## 2020-06-18 RX ORDER — HYDROCODONE BITARTRATE AND ACETAMINOPHEN 5; 325 MG/1; MG/1
1 TABLET ORAL ONCE AS NEEDED
Status: DISCONTINUED | OUTPATIENT
Start: 2020-06-18 | End: 2020-06-18 | Stop reason: HOSPADM

## 2020-06-18 RX ORDER — SODIUM CHLORIDE 0.9 % (FLUSH) 0.9 %
10 SYRINGE (ML) INJECTION AS NEEDED
Status: DISCONTINUED | OUTPATIENT
Start: 2020-06-18 | End: 2020-06-18 | Stop reason: HOSPADM

## 2020-06-18 RX ADMIN — FAMOTIDINE 20 MG: 20 TABLET, FILM COATED ORAL at 13:00

## 2020-06-18 RX ADMIN — LIDOCAINE HYDROCHLORIDE 0.2 ML: 10 INJECTION, SOLUTION EPIDURAL; INFILTRATION; INTRACAUDAL; PERINEURAL at 13:00

## 2020-06-18 RX ADMIN — PROPOFOL 200 MG: 10 INJECTION, EMULSION INTRAVENOUS at 14:46

## 2020-06-18 RX ADMIN — SODIUM CHLORIDE, POTASSIUM CHLORIDE, SODIUM LACTATE AND CALCIUM CHLORIDE 9 ML/HR: 600; 310; 30; 20 INJECTION, SOLUTION INTRAVENOUS at 13:00

## 2020-06-18 RX ADMIN — BUPIVACAINE HYDROCHLORIDE 60 ML: 2.5 INJECTION, SOLUTION EPIDURAL; INFILTRATION; INTRACAUDAL; PERINEURAL at 14:48

## 2020-06-18 RX ADMIN — FENTANYL CITRATE 50 MCG: 50 INJECTION, SOLUTION INTRAMUSCULAR; INTRAVENOUS at 14:46

## 2020-06-18 RX ADMIN — SODIUM CHLORIDE, POTASSIUM CHLORIDE, SODIUM LACTATE AND CALCIUM CHLORIDE: 600; 310; 30; 20 INJECTION, SOLUTION INTRAVENOUS at 17:11

## 2020-06-18 RX ADMIN — CEFAZOLIN SODIUM 2 G: 2 INJECTION, SOLUTION INTRAVENOUS at 14:35

## 2020-06-18 RX ADMIN — DEXAMETHASONE SODIUM PHOSPHATE 4 MG: 10 INJECTION INTRAMUSCULAR; INTRAVENOUS at 14:48

## 2020-06-18 RX ADMIN — BUPRENORPHINE HYDROCHLORIDE 0.3 MG: 0.32 INJECTION INTRAMUSCULAR; INTRAVENOUS at 14:48

## 2020-06-18 RX ADMIN — DEXAMETHASONE SODIUM PHOSPHATE 6 MG: 4 INJECTION, SOLUTION INTRAMUSCULAR; INTRAVENOUS at 14:48

## 2020-06-18 RX ADMIN — LIDOCAINE HYDROCHLORIDE 50 MG: 10 INJECTION, SOLUTION EPIDURAL; INFILTRATION; INTRACAUDAL; PERINEURAL at 14:46

## 2020-06-18 NOTE — OP NOTE
General Surgery Operative Note    Kt Davis  [id number]  1955    Date of Surgery:  6/18/2020 17:06    Pre-op Diagnosis: Bilateral inguinal hernia    Post-op Diagnosis: Bilateral inguinal hernia              Right direct with a cord lipoma              Left indirect    Procedure: Open inguinal hernia repair with Prolite atrium mesh, bilateral    Surgeon: Johnathan Durant MD    Anesthesia: General with Block    Assistant: None    Fluids: 1 L of crystalloid    Estimated Blood Loss: Less than 25 mL    Urine Voided: Not recorded    Complications: None apparent    History:   65-year-old gentleman presented to office with complaints of a left inguinal hernia.  On physical exam he was noted to have bilateral inguinal hernia.       The risks and benefits of inguinal hernia repair with mesh were rehashed.  Our discussion included, but was not limited to: bleeding, infection, injury to adjacent viscera (spermatic cord, ilioinguinal/genitofemoral nerves), seroma, mesh complications, chronic pain, testicular loss, need for re-intervention, and medical issues from a cardiopulmonary and deep venous thrombosis standpoint.  All questions were answered and he understands and wishes to proceed.    Procedure:      After informed consent the patient was taken to the operating room and placed in the supine position on the operating table.  General anesthesia was induced, bilateral tap blocks were placed, and the bilateral groins were then prepped and draped in the standard sterile fashion.  An Ioban was placed on the skin.  A timeout was observed.        I began on the patient's left-hand side.  The anterior superior iliac spine and pubic tubercle were identified and a incision parallel to this was created.  I dissected down through Kecia's fascia to the external oblique which was incised in the direction of its fibers through the external ring.  Care was taken to identify the ilioinguinal nerve.  I obtained control of  the cord structures sharply at the level of the pubic tubercle and encircled them with a Penrose drain.  The cremasteric fibers were stripped down.  This demonstrated an indirect inguinal hernia sac.  This was freed from the surrounding tissue back to the level of the internal ring.  The sac was opened and a pursestring suture was placed around the base of the hernia sac taking care to avoid the left colon which one could see with attachments to the hernia sac. This was then amputated and then passed off as specimen. The Prolene mesh was then tacked to the pubic tubercle and run laterally along the inguinal ligament with Novafil suture.  A shutter in the mesh was created with adequate laxity for the cord structures themselves.  Then medially, superiorly, and laterally the mesh was tucked under the external oblique.  The wound was copiously irrigated and meticulous hemostasis obtained.  The external oblique was closed in a running fashion with 3-0 Vicryl, Kecia's fascia was reapproximated with interrupted 3-0 Vicryl, and the skin was closed with a running 4-0 subcuticular Monocryl.       I then went to the patient's right-hand side. The anterior superior iliac spine and pubic tubercle were identified and a incision parallel to this was created.  I dissected down through Kecia's fascia to the external oblique which was incised in the direction of its fibers through the external ring.  Care was taken to identify the ilioinguinal nerve.  I obtained control of the cord structures sharply at the level of the pubic tubercle and encircled them with a Penrose drain.  The cremasteric fibers were stripped down.  This this demonstrated no evidence of an indirect inguinal hernia sac.  He had a small lipoma of the cord which was amputated at its base.  He clearly had evidence of a direct inguinal hernia. The Prolene mesh was then tacked to the pubic tubercle and run laterally along the inguinal ligament with Novafil suture.  A  shutter in the mesh was created with adequate laxity for the cord structures themselves.  Then medially, superiorly, and laterally the mesh was tucked under the external oblique.  The wound was copiously irrigated and meticulous hemostasis obtained.  The external oblique was closed in a running fashion with 3-0 Vicryl, Kecia's fascia was reapproximated with interrupted 3-0 Vicryl, and the skin was closed with a running 4-0 subcuticular Monocryl.      All lap and needle counts were reported as correct at the end of the procedure ×2.  The patient was then transferred to the PACU.     Johnathan Durant MD     Date: 6/18/2020  Time: 17:06

## 2020-06-18 NOTE — INTERVAL H&P NOTE
[facility] Pre-op    Full history and physical note from office is up to date.  See office note attached.    /82 (BP Location: Left arm, Patient Position: Lying)   Pulse 80   Resp 18   SpO2 97%     LAB Results:  Lab Results   Component Value Date    WBC 7.97 06/16/2020    HGB 15.2 06/16/2020    HCT 44.8 06/16/2020    MCV 97.0 06/16/2020     06/16/2020    NEUTROABS 5.19 02/25/2020    GLUCOSE 94 06/16/2020    BUN 14 06/16/2020    CREATININE 1.06 06/16/2020    EGFRIFNONA 70 06/16/2020     06/16/2020    K 4.6 06/16/2020     06/16/2020    CO2 24.0 06/16/2020    MG 1.9 01/02/2018    PHOS 1.6 (L) 01/02/2018    CALCIUM 9.6 06/16/2020    ALBUMIN 4.40 02/25/2020    AST 20 02/25/2020    ALT 27 02/25/2020    BILITOT 0.4 02/25/2020       Cancer Staging (if applicable)  Cancer Patient: __ yes _x_no __unknown__N/A; If yes, clinical stage T:__ N:__M:__, stage group or __N/A    Esmer Boothe, APRN 6/18/2020 12:48     I have reviewed the above note, my prior note(s), appropriate imaging, and labs.  I have again discussed the risks and benefits of bilateral inguinal hernia repair with mesh with the patient.  All of their questions have been answered.  They understand and wish to proceed with surgical intervention.    CBC  Results from last 7 days   Lab Units 06/16/20  1038   WBC 10*3/mm3 7.97   HEMOGLOBIN g/dL 15.2   HEMATOCRIT % 44.8   PLATELETS 10*3/mm3 192       CMP  Results from last 7 days   Lab Units 06/16/20  1038   SODIUM mmol/L 140   POTASSIUM mmol/L 4.6   CHLORIDE mmol/L 106   CO2 mmol/L 24.0   BUN mg/dL 14   CREATININE mg/dL 1.06   CALCIUM mg/dL 9.6   GLUCOSE mg/dL 94       Coagulation Cascade  PTT   Date Value Ref Range Status   01/01/2018 <24.0 (L) 24.0 - 31.0 seconds Final     Comment:     Verified by repeat analysis.      INR   Date Value Ref Range Status   01/01/2018 1.02  Final     Protime   Date Value Ref Range Status   01/01/2018 11.1 9.6 - 11.5 Seconds Final

## 2020-06-22 LAB
CYTO UR: NORMAL
LAB AP CASE REPORT: NORMAL
LAB AP CLINICAL INFORMATION: NORMAL
PATH REPORT.FINAL DX SPEC: NORMAL
PATH REPORT.GROSS SPEC: NORMAL

## 2020-07-12 NOTE — ANESTHESIA POSTPROCEDURE EVALUATION
Patient: Kt Davis    Procedure Summary     Date:  06/18/20 Room / Location:   [facility]    Anesthesia Start:  1440 Anesthesia Stop:  1713    Procedure:  INGUINAL HERNIA REPAIR BILATERAL WITH MESH (Bilateral Abdomen) Diagnosis:      Surgeon:  Johnathan Durant MD Provider:  Yang Ledezma MD    Anesthesia Type:  general with block ASA Status:  2          Anesthesia Type: general with block    Vitals  Vitals Value Taken Time   /81 6/18/2020  7:15 PM   Temp 97.5 °F (36.4 °C) 6/18/2020  5:10 PM   Pulse 77 6/18/2020  7:15 PM   Resp 20 6/18/2020  7:15 PM   SpO2 92 % 6/18/2020  7:15 PM           Post Anesthesia Care and Evaluation    Patient location during evaluation: PACU  Patient participation: complete - patient participated  Level of consciousness: awake and alert  Pain score: 0  Pain management: adequate  Airway patency: patent  Anesthetic complications: No anesthetic complications  PONV Status: none  Cardiovascular status: hemodynamically stable and acceptable  Respiratory status: nonlabored ventilation, acceptable and nasal cannula  Hydration status: acceptable

## 2020-08-25 ENCOUNTER — OFFICE VISIT (OUTPATIENT)
Dept: FAMILY MEDICINE CLINIC | Facility: CLINIC | Age: 65
End: 2020-08-25

## 2020-08-25 VITALS
HEIGHT: 69 IN | SYSTOLIC BLOOD PRESSURE: 114 MMHG | WEIGHT: 187.8 LBS | OXYGEN SATURATION: 97 % | BODY MASS INDEX: 27.81 KG/M2 | RESPIRATION RATE: 14 BRPM | HEART RATE: 90 BPM | DIASTOLIC BLOOD PRESSURE: 76 MMHG | TEMPERATURE: 98 F

## 2020-08-25 DIAGNOSIS — I10 ESSENTIAL HYPERTENSION: ICD-10-CM

## 2020-08-25 PROBLEM — S02.119A CLOSED FRACTURE OF OCCIPITAL BONE (HCC): Status: RESOLVED | Noted: 2018-01-01 | Resolved: 2020-08-25

## 2020-08-25 PROCEDURE — 99213 OFFICE O/P EST LOW 20 MIN: CPT | Performed by: FAMILY MEDICINE

## 2020-08-25 RX ORDER — HYDROCHLOROTHIAZIDE 12.5 MG/1
12.5 TABLET ORAL DAILY
Qty: 90 TABLET | Refills: 1 | Status: SHIPPED | OUTPATIENT
Start: 2020-08-25 | End: 2021-03-01 | Stop reason: SDUPTHER

## 2020-08-25 NOTE — ASSESSMENT & PLAN NOTE
Hypertension is unchanged.  Medication changes per orders.  Ambulatory blood pressure monitoring.  Will discontinue valsartan and start hydrochlorothiazide due to patient having mild cough secondary to valsartan.  He had similar cough with lisinopril.  Blood pressure will be reassessed at the next regular appointment.

## 2020-08-25 NOTE — PROGRESS NOTES
Kt Davis is a 65 y.o. male who presents today for Hypertension    Patient recently had an inguinal hernia repair on 6/18/2020. He reports the procedure went smoothly and has had no problems since the operation. He has been doing well and has no post-operative complaints.       Hypertension   This is a chronic problem. The current episode started more than 1 year ago. The problem has been resolved since onset. The problem is controlled. Pertinent negatives include no anxiety, blurred vision, chest pain, headaches, malaise/fatigue, neck pain, orthopnea, palpitations, peripheral edema, PND, shortness of breath or sweats. Past treatments include ACE inhibitors (dry cough). Current antihypertension treatment includes angiotensin blockers. The current treatment provides significant improvement. There are no compliance problems.         Review of Systems   Constitutional: Negative for chills, fatigue, fever, malaise/fatigue and unexpected weight loss.   HENT: Negative for congestion, ear pain, hearing loss, sore throat and tinnitus.    Eyes: Negative for blurred vision, pain and visual disturbance.   Respiratory: Negative for cough, chest tightness, shortness of breath and wheezing.    Cardiovascular: Negative for chest pain, palpitations, orthopnea, leg swelling and PND.   Gastrointestinal: Negative for abdominal pain, blood in stool, constipation, diarrhea, nausea, vomiting and GERD.   Endocrine: Negative for polydipsia and polyuria.   Genitourinary: Negative for difficulty urinating, dysuria and hematuria.   Musculoskeletal: Negative for arthralgias, joint swelling and neck pain.   Skin: Negative for rash and skin lesions.   Allergic/Immunologic: Negative for environmental allergies.   Neurological: Negative for dizziness, seizures, syncope, light-headedness, headache and confusion.   Hematological: Does not bruise/bleed easily.   Psychiatric/Behavioral: Negative for suicidal ideas.        The following portions  "of the patient's history were reviewed and updated as appropriate: allergies, current medications, past family history, past medical history, past social history, past surgical history and problem list.    Current Outpatient Medications on File Prior to Visit   Medication Sig Dispense Refill   • Cholecalciferol (VITAMIN D3) 50 MCG (2000 UT) capsule Take 1,000 Units by mouth Daily.     • Dextromethorphan-guaiFENesin (MUCUS RELIEF DM PO) Take 1,200 mg by mouth As Needed.     • Multiple Vitamins-Minerals (MULTIVITAL PO) Take 1 dose by mouth Every Morning.     • [DISCONTINUED] valsartan (DIOVAN) 160 MG tablet Take 1 tablet by mouth Daily. 90 tablet 1   • [DISCONTINUED] HYDROcodone-acetaminophen (NORCO) 5-325 MG per tablet Take 1 tablet by mouth Every 6 (Six) Hours As Needed for Moderate Pain  (Pain). 15 tablet 0     No current facility-administered medications on file prior to visit.        Allergies   Allergen Reactions   • Lisinopril Cough   • Valsartan Cough        Visit Vitals  /76   Pulse 90   Temp 98 °F (36.7 °C) (Temporal)   Resp 14   Ht 175.3 cm (69\")   Wt 85.2 kg (187 lb 12.8 oz)   SpO2 97%   BMI 27.73 kg/m²        Physical Exam   Constitutional: He is oriented to person, place, and time. He appears well-developed and well-nourished. No distress.   HENT:   Head: Atraumatic.   Eyes: EOM are normal.   Neck: Normal range of motion. Neck supple.   Cardiovascular: Normal rate, regular rhythm, normal heart sounds and intact distal pulses. Exam reveals no gallop and no friction rub.   No murmur heard.  Pulmonary/Chest: Effort normal and breath sounds normal. No respiratory distress. He has no wheezes. He has no rales.   Abdominal: Soft. Bowel sounds are normal. He exhibits no distension. There is no tenderness.   Musculoskeletal: He exhibits no edema.   Neurological: He is alert and oriented to person, place, and time.   Skin: Skin is warm and dry. He is not diaphoretic.   Psychiatric: He has a normal mood and " affect. His behavior is normal.             Problems Addressed this Visit        Cardiovascular and Mediastinum    Hypertension     Hypertension is unchanged.  Medication changes per orders.  Ambulatory blood pressure monitoring.  Will discontinue valsartan and start hydrochlorothiazide due to patient having mild cough secondary to valsartan.  He had similar cough with lisinopril.  Blood pressure will be reassessed at the next regular appointment.         Relevant Medications    hydroCHLOROthiazide (HYDRODIURIL) 12.5 MG tablet          Return in about 6 months (around 2/25/2021) for Medicare Wellness.    Parts of this office note have been dictated by voice recognition software. Grammatical and/or spelling errors may be present.    Tani Vazquez MD   8/25/2020

## 2021-02-16 ENCOUNTER — IMMUNIZATION (OUTPATIENT)
Dept: VACCINE CLINIC | Facility: HOSPITAL | Age: 66
End: 2021-02-16

## 2021-02-16 PROCEDURE — 91300 HC SARSCOV02 VAC 30MCG/0.3ML IM: CPT | Performed by: INTERNAL MEDICINE

## 2021-02-16 PROCEDURE — 0001A: CPT | Performed by: INTERNAL MEDICINE

## 2021-03-01 ENCOUNTER — HOSPITAL ENCOUNTER (OUTPATIENT)
Dept: GENERAL RADIOLOGY | Facility: HOSPITAL | Age: 66
Discharge: HOME | End: 2021-03-01
Admitting: FAMILY MEDICINE

## 2021-03-01 ENCOUNTER — OFFICE VISIT (OUTPATIENT)
Dept: FAMILY MEDICINE CLINIC | Facility: CLINIC | Age: 66
End: 2021-03-01

## 2021-03-01 VITALS
HEIGHT: 69 IN | WEIGHT: 185 LBS | OXYGEN SATURATION: 98 % | SYSTOLIC BLOOD PRESSURE: 140 MMHG | BODY MASS INDEX: 27.4 KG/M2 | DIASTOLIC BLOOD PRESSURE: 72 MMHG | TEMPERATURE: 98.2 F | HEART RATE: 76 BPM

## 2021-03-01 DIAGNOSIS — M25.551 ACUTE RIGHT HIP PAIN: ICD-10-CM

## 2021-03-01 DIAGNOSIS — I10 ESSENTIAL HYPERTENSION: ICD-10-CM

## 2021-03-01 DIAGNOSIS — Z12.5 ENCOUNTER FOR SCREENING FOR MALIGNANT NEOPLASM OF PROSTATE: ICD-10-CM

## 2021-03-01 DIAGNOSIS — M25.561 ACUTE PAIN OF RIGHT KNEE: ICD-10-CM

## 2021-03-01 DIAGNOSIS — Z00.00 MEDICARE ANNUAL WELLNESS VISIT, INITIAL: Primary | ICD-10-CM

## 2021-03-01 PROCEDURE — G0439 PPPS, SUBSEQ VISIT: HCPCS | Performed by: FAMILY MEDICINE

## 2021-03-01 PROCEDURE — 80053 COMPREHEN METABOLIC PANEL: CPT | Performed by: FAMILY MEDICINE

## 2021-03-01 PROCEDURE — 73502 X-RAY EXAM HIP UNI 2-3 VIEWS: CPT

## 2021-03-01 PROCEDURE — 99214 OFFICE O/P EST MOD 30 MIN: CPT | Performed by: FAMILY MEDICINE

## 2021-03-01 PROCEDURE — G0103 PSA SCREENING: HCPCS | Performed by: FAMILY MEDICINE

## 2021-03-01 PROCEDURE — 85025 COMPLETE CBC W/AUTO DIFF WBC: CPT | Performed by: FAMILY MEDICINE

## 2021-03-01 PROCEDURE — 80061 LIPID PANEL: CPT | Performed by: FAMILY MEDICINE

## 2021-03-01 RX ORDER — DICLOFENAC SODIUM 75 MG/1
75 TABLET, DELAYED RELEASE ORAL 2 TIMES DAILY
Qty: 180 TABLET | Refills: 1 | Status: SHIPPED | OUTPATIENT
Start: 2021-03-01 | End: 2021-11-09 | Stop reason: CLARIF

## 2021-03-01 RX ORDER — HYDROCHLOROTHIAZIDE 25 MG/1
25 TABLET ORAL DAILY
Qty: 90 TABLET | Refills: 1 | Status: SHIPPED | OUTPATIENT
Start: 2021-03-01 | End: 2021-09-08 | Stop reason: SINTOL

## 2021-03-01 NOTE — PATIENT INSTRUCTIONS
Medicare Wellness  Personal Prevention Plan of Service     Date of Office Visit:  2021  Encounter Provider:  Tani Vazquez MD  Place of Service:  [facility]  Patient Name: Kt Davis  :  1955    As part of the Medicare Wellness portion of your visit today, we are providing you with this personalized preventive plan of services (PPPS). This plan is based upon recommendations of the [place] Preventive Services Task Force (USPSTF) and the Advisory Committee on Immunization Practices (ACIP).    This lists the preventive care services that should be considered, and provides dates of when you are due. Items listed as completed are up-to-date and do not require any further intervention.    Health Maintenance   Topic Date Due   • Pneumococcal Vaccine 65+ (1 of 1 - PPSV23) 2019   • ANNUAL WELLNESS VISIT  2021   • ZOSTER VACCINE (2 of 2) 2021 (Originally 2020)   • COVID-19 Vaccine (2 of 2 - Pfizer series) 2021   • TDAP/TD VACCINES (2 - Td) 2023   • COLONOSCOPY  2025   • HEPATITIS C SCREENING  Completed   • INFLUENZA VACCINE  Completed   • MENINGOCOCCAL VACCINE  Aged Out       Orders Placed This Encounter   Procedures   • XR Hip With or Without Pelvis 2 - 3 View Right     Standing Status:   Future     Standing Expiration Date:   3/1/2022     Order Specific Question:   Reason for Exam:     Answer:   right hip pain   • Comprehensive Metabolic Panel   • PSA Screen   • Lipid Panel   • CBC Auto Differential   • CBC & Differential     Order Specific Question:   Manual Differential     Answer:   No       Return in about 6 months (around 2021) for Follow-up HTN, migraines.          Preventive Care 65 Years and Older, Male  Preventive care refers to lifestyle choices and visits with your health care provider that can promote health and wellness. This includes:  · A yearly physical exam. This is also called an annual well  check.  · Regular dental and eye exams.  · Immunizations.  · Screening for certain conditions.  · Healthy lifestyle choices, such as diet and exercise.  What can I expect for my preventive care visit?  Physical exam  Your health care provider will check:  · Height and weight. These may be used to calculate body mass index (BMI), which is a measurement that tells if you are at a healthy weight.  · Heart rate and blood pressure.  · Your skin for abnormal spots.  Counseling  Your health care provider may ask you questions about:  · Alcohol, tobacco, and drug use.  · Emotional well-being.  · Home and relationship well-being.  · Sexual activity.  · Eating habits.  · History of falls.  · Memory and ability to understand (cognition).  · Work and work environment.  What immunizations do I need?    Influenza (flu) vaccine  · This is recommended every year.  Tetanus, diphtheria, and pertussis (Tdap) vaccine  · You may need a Td booster every 10 years.  Varicella (chickenpox) vaccine  · You may need this vaccine if you have not already been vaccinated.  Zoster (shingles) vaccine  · You may need this after age 60.  Pneumococcal conjugate (PCV13) vaccine  · One dose is recommended after age 65.  Pneumococcal polysaccharide (PPSV23) vaccine  · One dose is recommended after age 65.  Measles, mumps, and rubella (MMR) vaccine  · You may need at least one dose of MMR if you were born in 1957 or later. You may also need a second dose.  Meningococcal conjugate (MenACWY) vaccine  · You may need this if you have certain conditions.  Hepatitis A vaccine  · You may need this if you have certain conditions or if you travel or work in places where you may be exposed to hepatitis A.  Hepatitis B vaccine  · You may need this if you have certain conditions or if you travel or work in places where you may be exposed to hepatitis B.  Haemophilus influenzae type b (Hib) vaccine  · You may need this if you have certain conditions.  You may receive  vaccines as individual doses or as more than one vaccine together in one shot (combination vaccines). Talk with your health care provider about the risks and benefits of combination vaccines.  What tests do I need?  Blood tests  · Lipid and cholesterol levels. These may be checked every 5 years, or more frequently depending on your overall health.  · Hepatitis C test.  · Hepatitis B test.  Screening  · Lung cancer screening. You may have this screening every year starting at age 55 if you have a 30-pack-year history of smoking and currently smoke or have quit within the past 15 years.  · Colorectal cancer screening. All adults should have this screening starting at age 50 and continuing until age 75. Your health care provider may recommend screening at age 45 if you are at increased risk. You will have tests every 1-10 years, depending on your results and the type of screening test.  · Prostate cancer screening. Recommendations will vary depending on your family history and other risks.  · Diabetes screening. This is done by checking your blood sugar (glucose) after you have not eaten for a while (fasting). You may have this done every 1-3 years.  · Abdominal aortic aneurysm (AAA) screening. You may need this if you are a current or former smoker.  · Sexually transmitted disease (STD) testing.  Follow these instructions at home:  Eating and drinking  · Eat a diet that includes fresh fruits and vegetables, whole grains, lean protein, and low-fat dairy products. Limit your intake of foods with high amounts of sugar, saturated fats, and salt.  · Take vitamin and mineral supplements as recommended by your health care provider.  · Do not drink alcohol if your health care provider tells you not to drink.  · If you drink alcohol:  ? Limit how much you have to 0-2 drinks a day.  ? Be aware of how much alcohol is in your drink. In the [place] one drink equals one 12 oz bottle of beer (355 mL), one 5 oz glass of wine (148 mL),  or one 1½ oz glass of hard liquor (44 mL).  Lifestyle  · Take daily care of your teeth and gums.  · Stay active. Exercise for at least 30 minutes on 5 or more days each week.  · Do not use any products that contain nicotine or tobacco, such as cigarettes, e-cigarettes, and chewing tobacco. If you need help quitting, ask your health care provider.  · If you are sexually active, practice safe sex. Use a condom or other form of protection to prevent STIs (sexually transmitted infections).  · Talk with your health care provider about taking a low-dose aspirin or statin.  What's next?  · Visit your health care provider once a year for a well check visit.  · Ask your health care provider how often you should have your eyes and teeth checked.  · Stay up to date on all vaccines.  This information is not intended to replace advice given to you by your health care provider. Make sure you discuss any questions you have with your health care provider.  Document Revised: 12/12/2019 Document Reviewed: 12/12/2019  [employer] Patient Education © 2020 [employer]

## 2021-03-01 NOTE — ASSESSMENT & PLAN NOTE
Patient has acute on chronic right hip pain.  He states he has had problems with his right hip off and on but nothing as severe as current symptoms.  Does radiate down causing some knee pain as well.  Patient is a Tylenol with some relief which she will continue.  He was given diclofenac for further treatment.  Patient was given referral to physical therapy for further evaluation and treatment.  X-ray was ordered as well.  If patient fails conservative outpatient therapy will refer him to orthopedic surgery for further evaluation and treatment.

## 2021-03-01 NOTE — PROGRESS NOTES
The ABCs of the Annual Wellness Visit  Subsequent Medicare Wellness Visit    Chief Complaint   Patient presents with   • Medicare Wellness-subsequent   • Pain     right hip and knee       Subjective   History of Present Illness:  Kt Davis is a 66 y.o. male who presents for a Subsequent Medicare Wellness Visit.    Patient complains of right hip and knee pain. Patient states the hip pain is the worst and he has noticed over the time the pain has began to radiate to his knee. Patient states he believes this first started about 2 years ago when he was doing some georgie in his house. Patient states this past week he was helping his daughter paint at her house on a step ladder. Patient says the pain is specifically in his right hip and the lateral side of his right knee. Patient has no pain at rest sitting, but patient has pain with walking. Patient describes the pain as intense and sharp. At it's worst, patient rates the pain as a 7/10. Patient has tried some Tylenol with some relief.     HEALTH RISK ASSESSMENT    Recent Hospitalizations:  No hospitalization(s) within the last year.    Current Medical Providers:  Patient Care Team:  Tani Vazquez MD as PCP - General (Family Medicine)    Smoking Status:  Social History     Tobacco Use   Smoking Status Never Smoker   Smokeless Tobacco Never Used       Alcohol Consumption:  Social History     Substance and Sexual Activity   Alcohol Use No       Depression Screen:   PHQ-2/PHQ-9 Depression Screening 3/1/2021   Little interest or pleasure in doing things 0   Feeling down, depressed, or hopeless 0   Total Score 0       Fall Risk Screen:  SHEILAADI Fall Risk Assessment was completed, and patient is at LOW risk for falls.Assessment completed on:3/1/2021    Health Habits and Functional and Cognitive Screening:  Functional & Cognitive Status 3/1/2021   Do you have difficulty preparing food and eating? No   Do you have difficulty bathing yourself, getting dressed or  grooming yourself? No   Do you have difficulty using the toilet? No   Do you have difficulty moving around from place to place? No   Do you have trouble with steps or getting out of a bed or a chair? No   Current Diet Well Balanced Diet   Dental Exam Up to date   Eye Exam Up to date   Exercise (times per week) 4 times per week   Current Exercise Activities Include Walking   Do you need help using the phone?  No   Are you deaf or do you have serious difficulty hearing?  No   Do you need help with transportation? No   Do you need help shopping? No   Do you need help preparing meals?  No   Do you need help with housework?  No   Do you need help with laundry? No   Do you need help taking your medications? No   Do you need help managing money? No   Do you ever drive or ride in a car without wearing a seat belt? No   Have you felt unusual stress, anger or loneliness in the last month? No   Who do you live with? Spouse   If you need help, do you have trouble finding someone available to you? No   Have you been bothered in the last four weeks by sexual problems? No   Do you have difficulty concentrating, remembering or making decisions? No         Does the patient have evidence of cognitive impairment? No    Asprin use counseling:Does not need ASA (and currently is not on it)    Age-appropriate Screening Schedule:  Refer to the list below for future screening recommendations based on patient's age, sex and/or medical conditions. Orders for these recommended tests are listed in the plan section. The patient has been provided with a written plan.    Health Maintenance   Topic Date Due   • ZOSTER VACCINE (2 of 2) 03/05/2021 (Originally 2/25/2020)   • TDAP/TD VACCINES (2 - Td) 01/01/2023   • COLONOSCOPY  12/02/2025   • INFLUENZA VACCINE  Completed          The following portions of the patient's history were reviewed and updated as appropriate:   He  has a past medical history of History of esophageal stricture (2015), History  of gastric ulcer (2017), History of transfusion (2017), Hypertension, Inguinal hernia, and Post-traumatic brain syndrome (2018).  He does not have any pertinent problems on file.  He  has a past surgical history that includes Colonoscopy (2015); Appendectomy (1999); Esophageal dilation (2015); Upper gastrointestinal endoscopy (2015); tonsillectomy and adenoidectomy (1959); Tonsillectomy; Inguinal Hernia Repair (Bilateral, 6/18/2020); and Inguinal hernia repair.  His family history includes Allergies in his daughter; Asthma in his daughter; Colon cancer in his mother; Diverticulitis in his paternal grandfather; Emphysema in his paternal grandfather; Heart block in his maternal grandfather; Heart disease in his father; Macular degeneration in his paternal grandmother; No Known Problems in his son; Other in his maternal grandmother; Parkinsonism in his father.  He  reports that he has never smoked. He has never used smokeless tobacco. He reports that he does not drink alcohol or use drugs.  Current Outpatient Medications   Medication Sig Dispense Refill   • Cholecalciferol (VITAMIN D3) 50 MCG (2000 UT) capsule Take 1,000 Units by mouth Daily.     • Dextromethorphan-guaiFENesin (MUCUS RELIEF DM PO) Take 1,200 mg by mouth As Needed.     • hydroCHLOROthiazide (HYDRODIURIL) 25 MG tablet Take 1 tablet by mouth Daily. 90 tablet 1   • Multiple Vitamins-Minerals (MULTIVITAL PO) Take 1 dose by mouth Every Morning.     • diclofenac (VOLTAREN) 75 MG EC tablet Take 1 tablet by mouth 2 (Two) Times a Day. 180 tablet 1     No current facility-administered medications for this visit.      Current Outpatient Medications on File Prior to Visit   Medication Sig   • Cholecalciferol (VITAMIN D3) 50 MCG (2000 UT) capsule Take 1,000 Units by mouth Daily.   • Dextromethorphan-guaiFENesin (MUCUS RELIEF DM PO) Take 1,200 mg by mouth As Needed.   • Multiple Vitamins-Minerals (MULTIVITAL PO) Take 1 dose by mouth Every Morning.   • [DISCONTINUED]  hydroCHLOROthiazide (HYDRODIURIL) 12.5 MG tablet Take 1 tablet by mouth Daily.     No current facility-administered medications on file prior to visit.      He is allergic to lisinopril and valsartan.    Outpatient Medications Prior to Visit   Medication Sig Dispense Refill   • Cholecalciferol (VITAMIN D3) 50 MCG (2000 UT) capsule Take 1,000 Units by mouth Daily.     • Dextromethorphan-guaiFENesin (MUCUS RELIEF DM PO) Take 1,200 mg by mouth As Needed.     • Multiple Vitamins-Minerals (MULTIVITAL PO) Take 1 dose by mouth Every Morning.     • hydroCHLOROthiazide (HYDRODIURIL) 12.5 MG tablet Take 1 tablet by mouth Daily. 90 tablet 1     No facility-administered medications prior to visit.        Patient Active Problem List   Diagnosis   • SDH (subdural hematoma) (CMS/HCC)   • SAH (subarachnoid hemorrhage) (CMS/HCC)   • Fall due to ice or snow   • Blunt head trauma due to fall   • Traumatic subarachnoid hemorrhage with loss of consciousness of 30 minutes or less (CMS/HCC)   • Hypertension   • Inguinal hernia of left side without obstruction or gangrene   • Medicare annual wellness visit, initial   • Inguinal hernia   • Encounter for screening for malignant neoplasm of prostate    • Acute right hip pain   • Acute pain of right knee       Advanced Care Planning:  ACP discussion was held with the patient during this visit. Patient has an advance directive in EMR which is still valid.     Review of Systems   Constitutional: Negative for chills, diaphoresis, fatigue and fever.   Respiratory: Negative for cough and shortness of breath.    Cardiovascular: Negative for chest pain and palpitations.       Compared to one year ago, the patient feels his physical health is the same.  Compared to one year ago, the patient feels his mental health is the same.    Reviewed chart for potential of high risk medication in the elderly: yes  Reviewed chart for potential of harmful drug interactions in the elderly:yes    Objective        "  Vitals:    03/01/21 1315   BP: 140/72   BP Location: Right arm   Patient Position: Sitting   Cuff Size: Adult   Pulse: 76   Temp: 98.2 °F (36.8 °C)   SpO2: 98%   Weight: 83.9 kg (185 lb)   Height: 175.3 cm (69\")   PainSc: 0-No pain       Body mass index is 27.32 kg/m².  Discussed the patient's BMI with him. The BMI is in the acceptable range.    Physical Exam  Constitutional:       General: He is not in acute distress.     Appearance: He is well-developed. He is not ill-appearing.   HENT:      Head: Normocephalic and atraumatic.      Right Ear: Hearing normal.      Left Ear: Hearing normal.      Nose: Nose normal.   Eyes:      General: No scleral icterus.        Right eye: No discharge.         Left eye: No discharge.      Pupils: Pupils are equal, round, and reactive to light.   Neck:      Musculoskeletal: Normal range of motion and neck supple.      Thyroid: No thyromegaly.      Vascular: No JVD.      Trachea: No tracheal deviation.   Cardiovascular:      Rate and Rhythm: Normal rate and regular rhythm.      Heart sounds: Normal heart sounds. No murmur. No friction rub. No gallop.    Pulmonary:      Effort: Pulmonary effort is normal. No respiratory distress.      Breath sounds: Normal breath sounds. No stridor. No wheezing or rales.   Abdominal:      General: Bowel sounds are normal. There is no distension.      Palpations: Abdomen is soft. There is no mass.      Tenderness: There is no abdominal tenderness. There is no guarding or rebound.      Hernia: No hernia is present.   Musculoskeletal:         General: No swelling, tenderness, deformity or signs of injury.      Right hip: He exhibits decreased range of motion. He exhibits no tenderness and no crepitus.      Right lower leg: No edema.      Left lower leg: No edema.   Lymphadenopathy:      Cervical: No cervical adenopathy.   Skin:     General: Skin is warm and dry.      Findings: No rash.   Neurological:      Mental Status: He is alert and oriented to " person, place, and time.      Cranial Nerves: No cranial nerve deficit.      Motor: No abnormal muscle tone.      Coordination: Coordination normal.   Psychiatric:         Behavior: Behavior normal.               Assessment/Plan   Medicare Risks and Personalized Health Plan  CMS Preventative Services Quick Reference  Advance Directive Discussion  Cardiovascular risk  Colon Cancer Screening  Dementia/Memory   Depression/Dysphoria  Diabetic Lab Screening   Fall Risk  Immunizations Discussed/Encouraged (specific immunizations; Pneumococcal 23 )  Prostate Cancer Screening     The above risks/problems have been discussed with the patient.  Pertinent information has been shared with the patient in the After Visit Summary.  Follow up plans and orders are seen below in the Assessment/Plan Section.    Diagnoses and all orders for this visit:    1. Medicare annual wellness visit, initial (Primary)  Assessment & Plan:  The patient is here for health maintenance visit.  Currently, the patient consumes a healthy diet and has an adequate exercise regimen.  Screening lab work is ordered.  Immunizations were reviewed today.  Advice and education was given regarding nutrition, aerobic exercise, routine dental evaluations, routine eye exams, reproductive health, cardiovascular risk reduction, sunscreen use, self skin examination (annual dermatology evaluations) and seatbelt use (general overall safety).  Further recommendations will be given if needed after lab evaluation.  Annual wellness evaluation is recommended.      Orders:  -     CBC & Differential  -     Comprehensive Metabolic Panel  -     PSA Screen  -     Lipid Panel  -     CBC Auto Differential    2. Essential hypertension  Assessment & Plan:  Hypertension is worsening.  Medication changes per orders.  Blood pressure will be reassessed at the next regular appointment.    Orders:  -     hydroCHLOROthiazide (HYDRODIURIL) 25 MG tablet; Take 1 tablet by mouth Daily.  Dispense:  90 tablet; Refill: 1    3. Acute right hip pain  Assessment & Plan:  Patient has acute on chronic right hip pain.  He states he has had problems with his right hip off and on but nothing as severe as current symptoms.  Does radiate down causing some knee pain as well.  Patient is a Tylenol with some relief which she will continue.  He was given diclofenac for further treatment.  Patient was given referral to physical therapy for further evaluation and treatment.  X-ray was ordered as well.  If patient fails conservative outpatient therapy will refer him to orthopedic surgery for further evaluation and treatment.    Orders:  -     XR Hip With or Without Pelvis 2 - 3 View Right; Future    4. Acute pain of right knee    5. Encounter for screening for malignant neoplasm of prostate   -     PSA Screen    Other orders  -     diclofenac (VOLTAREN) 75 MG EC tablet; Take 1 tablet by mouth 2 (Two) Times a Day.  Dispense: 180 tablet; Refill: 1    Follow Up:  Return in about 6 months (around 9/1/2021) for Follow-up HTN, migraines.     An After Visit Summary and PPPS were given to the patient.

## 2021-03-02 LAB
ALBUMIN SERPL-MCNC: 4.3 G/DL (ref 3.5–5.2)
ALBUMIN/GLOB SERPL: 1.3 G/DL
ALP SERPL-CCNC: 76 U/L (ref 39–117)
ALT SERPL W P-5'-P-CCNC: 36 U/L (ref 1–41)
ANION GAP SERPL CALCULATED.3IONS-SCNC: 10.8 MMOL/L (ref 5–15)
AST SERPL-CCNC: 27 U/L (ref 1–40)
BASOPHILS # BLD AUTO: 0.07 10*3/MM3 (ref 0–0.2)
BASOPHILS NFR BLD AUTO: 0.6 % (ref 0–1.5)
BILIRUB SERPL-MCNC: 0.3 MG/DL (ref 0–1.2)
BUN SERPL-MCNC: 15 MG/DL (ref 8–23)
BUN/CREAT SERPL: 15.6 (ref 7–25)
CALCIUM SPEC-SCNC: 9.8 MG/DL (ref 8.6–10.5)
CHLORIDE SERPL-SCNC: 102 MMOL/L (ref 98–107)
CHOLEST SERPL-MCNC: 154 MG/DL (ref 0–200)
CO2 SERPL-SCNC: 27.2 MMOL/L (ref 22–29)
CREAT SERPL-MCNC: 0.96 MG/DL (ref 0.76–1.27)
DEPRECATED RDW RBC AUTO: 39.3 FL (ref 37–54)
EOSINOPHIL # BLD AUTO: 0.2 10*3/MM3 (ref 0–0.4)
EOSINOPHIL NFR BLD AUTO: 1.7 % (ref 0.3–6.2)
ERYTHROCYTE [DISTWIDTH] IN BLOOD BY AUTOMATED COUNT: 11.7 % (ref 12.3–15.4)
GFR SERPL CREATININE-BSD FRML MDRD: 78 ML/MIN/1.73
GLOBULIN UR ELPH-MCNC: 3.2 GM/DL
GLUCOSE SERPL-MCNC: 78 MG/DL (ref 65–99)
HCT VFR BLD AUTO: 46.4 % (ref 37.5–51)
HDLC SERPL-MCNC: 36 MG/DL (ref 40–60)
HGB BLD-MCNC: 16.6 G/DL (ref 13–17.7)
IMM GRANULOCYTES # BLD AUTO: 0.05 10*3/MM3 (ref 0–0.05)
IMM GRANULOCYTES NFR BLD AUTO: 0.4 % (ref 0–0.5)
LDLC SERPL CALC-MCNC: 80 MG/DL (ref 0–100)
LDLC/HDLC SERPL: 2.01 {RATIO}
LYMPHOCYTES # BLD AUTO: 1.97 10*3/MM3 (ref 0.7–3.1)
LYMPHOCYTES NFR BLD AUTO: 16.3 % (ref 19.6–45.3)
MCH RBC QN AUTO: 33.6 PG (ref 26.6–33)
MCHC RBC AUTO-ENTMCNC: 35.8 G/DL (ref 31.5–35.7)
MCV RBC AUTO: 93.9 FL (ref 79–97)
MONOCYTES # BLD AUTO: 1.31 10*3/MM3 (ref 0.1–0.9)
MONOCYTES NFR BLD AUTO: 10.9 % (ref 5–12)
NEUTROPHILS NFR BLD AUTO: 70.1 % (ref 42.7–76)
NEUTROPHILS NFR BLD AUTO: 8.46 10*3/MM3 (ref 1.7–7)
NRBC BLD AUTO-RTO: 0 /100 WBC (ref 0–0.2)
PLATELET # BLD AUTO: 278 10*3/MM3 (ref 140–450)
PMV BLD AUTO: 9.9 FL (ref 6–12)
POTASSIUM SERPL-SCNC: 4.8 MMOL/L (ref 3.5–5.2)
PROT SERPL-MCNC: 7.5 G/DL (ref 6–8.5)
PSA SERPL-MCNC: 0.48 NG/ML (ref 0–4)
RBC # BLD AUTO: 4.94 10*6/MM3 (ref 4.14–5.8)
SODIUM SERPL-SCNC: 140 MMOL/L (ref 136–145)
TRIGL SERPL-MCNC: 228 MG/DL (ref 0–150)
VLDLC SERPL-MCNC: 38 MG/DL (ref 5–40)
WBC # BLD AUTO: 12.06 10*3/MM3 (ref 3.4–10.8)

## 2021-03-04 DIAGNOSIS — M25.551 ACUTE RIGHT HIP PAIN: Primary | ICD-10-CM

## 2021-03-09 ENCOUNTER — IMMUNIZATION (OUTPATIENT)
Dept: VACCINE CLINIC | Facility: HOSPITAL | Age: 66
End: 2021-03-09

## 2021-03-09 PROCEDURE — 91300 HC SARSCOV02 VAC 30MCG/0.3ML IM: CPT | Performed by: INTERNAL MEDICINE

## 2021-03-09 PROCEDURE — 0002A: CPT | Performed by: INTERNAL MEDICINE

## 2021-03-11 ENCOUNTER — OFFICE VISIT (OUTPATIENT)
Dept: ORTHOPEDIC SURGERY | Facility: CLINIC | Age: 66
End: 2021-03-11

## 2021-03-11 VITALS
BODY MASS INDEX: 27.4 KG/M2 | SYSTOLIC BLOOD PRESSURE: 170 MMHG | HEIGHT: 69 IN | WEIGHT: 185 LBS | DIASTOLIC BLOOD PRESSURE: 88 MMHG | HEART RATE: 87 BPM

## 2021-03-11 DIAGNOSIS — M87.051 AVASCULAR NECROSIS OF BONE OF RIGHT HIP (HCC): ICD-10-CM

## 2021-03-11 DIAGNOSIS — M16.11 PRIMARY OSTEOARTHRITIS OF RIGHT HIP: Primary | ICD-10-CM

## 2021-03-11 PROCEDURE — 99203 OFFICE O/P NEW LOW 30 MIN: CPT | Performed by: ORTHOPAEDIC SURGERY

## 2021-03-11 NOTE — PROGRESS NOTES
Orthopaedic Clinic Note: Hip New Patient    Chief Complaint   Patient presents with   • Right Hip - Pain        HPI    Kt Davis is a 66 y.o. male who presents with right hip pain for 2 year(s). Onset atraumatic and gradual in nature. Pain is localized to lateral trochanter and is radiating down leg and is a 7/10 on the pain scale.Pain is described as stabbing. Associated symptoms include pain and stiffness.  The pain is worse with walking, climbing stairs, working and leisure; pain medication and/or NSAID improve the pain. Previous treatments have included: NSAIDS since symptom onset. Although some transient relief was reported with these interventions, these conservative measures have failed and symptoms have persisted. The patient is limited in daily activities and has had a significant decrease in quality of life as a result. He denies fevers, chills, or constitutional symptoms.    I have reviewed the following portions of the patient's history:History of Present Illness    Past Medical History:   Diagnosis Date   • History of esophageal stricture 2015   • History of gastric ulcer 2017   • History of transfusion 2017    4 units due to GI bleed    • Hypertension    • Inguinal hernia    • Post-traumatic brain syndrome 2018    r/t falling outside in winter. 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      Past Surgical History:   Procedure Laterality Date   • APPENDECTOMY  1999   • COLONOSCOPY  2015   • ESOPHAGEAL DILATATION  2015   • INGUINAL HERNIA REPAIR Bilateral 6/18/2020    Procedure: INGUINAL HERNIA REPAIR BILATERAL WITH MESH;  Surgeon: Johnathan Durant MD;  Location: [facility];  Service: General;  Laterality: Bilateral;   • INGUINAL HERNIA REPAIR     • TONSILLECTOMY     • TONSILLECTOMY AND ADENOIDECTOMY  1959   • UPPER GASTROINTESTINAL ENDOSCOPY  2015      Family History   Problem Relation Age of Onset   • Colon cancer Mother    • Parkinsonism Father    • Heart disease Father    • Allergies Daughter    • Asthma  Daughter    • No Known Problems Son    • Other Maternal Grandmother         Unknown   • Heart block Maternal Grandfather    • Macular degeneration Paternal Grandmother    • Diverticulitis Paternal Grandfather    • Emphysema Paternal Grandfather      Social History     Socioeconomic History   • Marital status:      Spouse name: Shena   • Number of children: 2   • Years of education: College   • Highest education level: Not on file   Tobacco Use   • Smoking status: Never Smoker   • Smokeless tobacco: Never Used   Substance and Sexual Activity   • Alcohol use: No   • Drug use: No   • Sexual activity: Defer     Partners: Female     Comment:       Current Outpatient Medications on File Prior to Visit   Medication Sig Dispense Refill   • Cholecalciferol (VITAMIN D3) 50 MCG (2000 UT) capsule Take 1,000 Units by mouth Daily.     • Dextromethorphan-guaiFENesin (MUCUS RELIEF DM PO) Take 1,200 mg by mouth As Needed.     • diclofenac (VOLTAREN) 75 MG EC tablet Take 1 tablet by mouth 2 (Two) Times a Day. 180 tablet 1   • hydroCHLOROthiazide (HYDRODIURIL) 25 MG tablet Take 1 tablet by mouth Daily. 90 tablet 1   • Multiple Vitamins-Minerals (MULTIVITAL PO) Take 1 dose by mouth Every Morning.       No current facility-administered medications on file prior to visit.      Allergies   Allergen Reactions   • Lisinopril Cough   • Valsartan Cough        Review of Systems   Constitutional: Negative.    HENT: Negative.    Eyes: Negative.    Respiratory: Negative.    Cardiovascular: Negative.    Gastrointestinal: Negative.    Endocrine: Negative.    Genitourinary: Negative.    Musculoskeletal: Positive for arthralgias.   Skin: Negative.    Allergic/Immunologic: Negative.    Neurological: Negative.    Hematological: Negative.    Psychiatric/Behavioral: Negative.         The patient's Review of Systems was personally reviewed and confirmed as accurate.    The following portions of the patient's history were reviewed and updated  "as appropriate: allergies, current medications, past family history, past medical history, past social history, past surgical history and problem list.    Physical Exam  Blood pressure 170/88, pulse 87, height 175.3 cm (69\"), weight 83.9 kg (185 lb).    Body mass index is 27.32 kg/m².    GENERAL APPEARANCE: awake, alert & oriented x 3, in no acute distress and well developed, well nourished  PSYCH: normal affect  LUNGS:  breathing nonlabored  EYES: sclera anicteric  CARDIOVASCULAR: palpable dorsalis pedis, palpable posterior tibial bilaterally. Capillary refill less than 2 seconds  EXTREMITIES: no clubbing, cyanosis  GAIT:  Normal           Right Hip Exam:  RANGE OF MOTION:   FLEXION CONTRACTURE: None   FLEXION: 110 degrees   INTERNAL ROTATION: 20 degrees at 90 degrees of flexion   EXTERNAL ROTATION: 40 degrees at 90 degrees of flexion    PAIN WITH HIP MOTION: Yes, laterally at trochanter  PAIN WITH LOGROLL: no  STINCHFIELD TEST: negative    KNEE EXAM: full knee ROM (0-120 degrees), stable to varus and valgus stress at terminal extension and 30 degrees flexion     STRENGTH:  5/5 hip adduction, abduction, flexion. 5/5 strength knee flexion, extension. 5/5 strength ankle dorsiflexion and plantarflexion.     GREATER TROCHANTER BURSAL PAIN:  no     REFLEXES:   PATELLAR 2+/4   ACHILLES 2+/4    CLONUS: negative  STRAIGHT LEG TEST:   negative    SENSATION TO LIGHT TOUCH:  DEEP PERONEAL/SUPERFICIAL PERONEAL/SURAL/SAPHENOUS/TIBIAL:  intact    EDEMA:   no  ERYTHEMA:  no  WOUNDS/INCISIONS: no overlying skin problems.      Left Hip Exam:   RANGE OF MOTION:   FLEXION CONTRACTURE: None   FLEXION: 110 degrees   INTERNAL ROTATION: 20 degrees at 90 degrees of flexion   EXTERNAL ROTATION: 40 degrees at 90 degrees of flexion    PAIN WITH HIP MOTION: no  PAIN WITH LOGROLL: no  STINCHFIELD TEST: negative    KNEE EXAM: full knee ROM (0-120 degrees), stable to varus and valgus stress at terminal extension and 30 degrees flexion "     STRENGTH:  5/5 hip adduction, abduction, flexion. 5/5 strength knee flexion, extension. 5/5 strength ankle dorsiflexion and plantarflexion.     GREATER TROCHANTER BURSAL PAIN:  no     REFLEXES:   PATELLAR 2+/4   ACHILLES 2+/4    CLONUS: negative  STRAIGHT LEG TEST:   negative    SENSATION TO LIGHT TOUCH:  DEEP PERONEAL/SUPERFICIAL PERONEAL/SURAL/SAPHENOUS/TIBIAL:  intact    EDEMA:   no  ERYTHEMA:  no  WOUNDS/INCISIONS: no overlying skin problems.      ------------------------------------------------------------------    LEG LENGTHS:  equal  _____________________________________________________  _____________________________________________________    RADIOGRAPHIC FINDINGS:   AP pelvis and right hip radiographs in 3/1/2021 personally reviewed.  Radiographs demonstrate moderate arthritis of the right hip with joint space narrowing.  Lateral view demonstrates what appears to be evidence of avascular necrosis with subchondral collapse.  Left hip demonstrates mild arthritic changes.    Assessment/Plan:   Diagnosis Plan   1. Primary osteoarthritis of right hip     2. Avascular necrosis of bone of right hip (CMS/HCC)       Patient is experiencing arthritic pain of the right hip with associated avascular necrosis.  Since starting the anti-inflammatory, diclofenac, his symptoms have greatly improved.  Overall, he does not feel symptoms are severe enough to warrant additional intervention at this time.  I recommend he continue the anti-inflammatory for the next week to 2 weeks and then wean as his symptoms allow.  He is agreeable to this plan.  He will follow-up with me as needed.    Davin Swenson MD  03/11/21  11:10 EST

## 2021-04-28 ENCOUNTER — OFFICE VISIT (OUTPATIENT)
Dept: FAMILY MEDICINE CLINIC | Facility: CLINIC | Age: 66
End: 2021-04-28

## 2021-04-28 VITALS
TEMPERATURE: 97.7 F | BODY MASS INDEX: 27.58 KG/M2 | RESPIRATION RATE: 16 BRPM | HEIGHT: 69 IN | OXYGEN SATURATION: 99 % | HEART RATE: 78 BPM | WEIGHT: 186.2 LBS | DIASTOLIC BLOOD PRESSURE: 90 MMHG | SYSTOLIC BLOOD PRESSURE: 130 MMHG

## 2021-04-28 DIAGNOSIS — M62.81 QUADRICEPS WEAKNESS: ICD-10-CM

## 2021-04-28 DIAGNOSIS — M25.562 ACUTE PAIN OF BOTH KNEES: Primary | ICD-10-CM

## 2021-04-28 DIAGNOSIS — M25.561 ACUTE PAIN OF BOTH KNEES: Primary | ICD-10-CM

## 2021-04-28 DIAGNOSIS — M87.051 AVASCULAR NECROSIS OF HIP, RIGHT (HCC): ICD-10-CM

## 2021-04-28 PROCEDURE — 99213 OFFICE O/P EST LOW 20 MIN: CPT | Performed by: FAMILY MEDICINE

## 2021-04-28 NOTE — PROGRESS NOTES
Kt Davis is a 66 y.o. male who presents today for Follow-up (leg pain)      He is following-up on right hip pain due to AVN and arthritis. He went to see Dr. Swenson with ortho after his annual visit and was advised that the degeneration was significant and he will eventually need a hip replacement. He thinks that the Xrays he had done where only of his right hip. The pain has been significantly decreased with scheduled PO Volteran so far. However, he noticied an acute onset weakness in his bilateral quadriceps musclesd as well for the past about 3 weeks which he is concerned about today.  It has been persistent, but fluctuates in severity. At its worst he notices he has to use a shorter gait and has nearly cause him to fall. He had his second COVID shot March 9th and wonders if it is related. He has also been having a dull aching pain in his knees and shins, right greater than left, which is keeping him up at night. He does note increased activity as of late, painting and climbing ladders. He denies fever, chills, weakness, or shortness of breath today.        The following portions of the patient's history were reviewed and updated as appropriate: allergies, current medications, past family history, past medical history, past social history, past surgical history and problem list.    Current Outpatient Medications on File Prior to Visit   Medication Sig Dispense Refill   • Cholecalciferol (VITAMIN D3) 50 MCG (2000 UT) capsule Take 1,000 Units by mouth Daily.     • Dextromethorphan-guaiFENesin (MUCUS RELIEF DM PO) Take 1,200 mg by mouth As Needed.     • diclofenac (VOLTAREN) 75 MG EC tablet Take 1 tablet by mouth 2 (Two) Times a Day. 180 tablet 1   • hydroCHLOROthiazide (HYDRODIURIL) 25 MG tablet Take 1 tablet by mouth Daily. 90 tablet 1   • Multiple Vitamins-Minerals (MULTIVITAL PO) Take 1 dose by mouth Every Morning.       No current facility-administered medications on file prior to visit.       Allergies  "  Allergen Reactions   • Lisinopril Cough   • Valsartan Cough        Visit Vitals  /90   Pulse 78   Temp 97.7 °F (36.5 °C)   Resp 16   Ht 175.3 cm (69.02\")   Wt 84.5 kg (186 lb 3.2 oz)   SpO2 99%   BMI 27.48 kg/m²        Physical Exam  Constitutional:       General: He is not in acute distress.     Appearance: He is well-developed. He is not diaphoretic.   HENT:      Head: Atraumatic.   Cardiovascular:      Rate and Rhythm: Normal rate and regular rhythm.      Heart sounds: Normal heart sounds. No murmur heard.   No friction rub. No gallop.    Pulmonary:      Effort: Pulmonary effort is normal. No respiratory distress.      Breath sounds: Normal breath sounds. No wheezing or rales.   Musculoskeletal:      Cervical back: Normal range of motion and neck supple.      Right hip: Decreased range of motion (2/2 pain). Normal strength.      Left hip: Decreased range of motion (2/2 pain). Normal strength.      Right upper leg: Normal.      Left upper leg: Normal.      Right knee: Normal.      Left knee: Normal.   Skin:     General: Skin is warm and dry.   Neurological:      Mental Status: He is alert and oriented to person, place, and time.   Psychiatric:         Behavior: Behavior normal.          Problems Addressed this Visit        Musculoskeletal and Injuries    Acute pain of both knees - Primary     Subjective weakness in the legs strength was 5 out of 5 throughout.  Feel that the knee pain is likely secondary to abnormality in gait from hip pain.  Patient was given referral to physical therapy for further evaluation and treatment.  Patient will continue to take diclofenac BID as needed.         Relevant Orders    Ambulatory Referral to Physical Therapy Evaluate and treat    Avascular necrosis of hip, right (CMS/[facility])    Relevant Orders    Ambulatory Referral to Physical Therapy Evaluate and treat    Quadriceps weakness    Relevant Orders    Ambulatory Referral to Physical Therapy Evaluate and treat      Diagnoses "       Codes Comments    Acute pain of both knees    -  Primary ICD-10-CM: M25.561, M25.562  ICD-9-CM: 338.19, 719.46     Quadriceps weakness     ICD-10-CM: M62.81  ICD-9-CM: 728.87     Avascular necrosis of hip, right (CMS/HCC)     ICD-10-CM: M87.051  ICD-9-CM: 733.42           Return if symptoms worsen or fail to improve.    Parts of this office note have been dictated by voice recognition software. Grammatical and/or spelling errors may be present.    Tani Vazquez MD   4/29/2021

## 2021-04-29 NOTE — ASSESSMENT & PLAN NOTE
Subjective weakness in the legs strength was 5 out of 5 throughout.  Feel that the knee pain is likely secondary to abnormality in gait from hip pain.  Patient was given referral to physical therapy for further evaluation and treatment.  Patient will continue to take diclofenac BID as needed.

## 2021-05-06 ENCOUNTER — TREATMENT (OUTPATIENT)
Dept: PHYSICAL THERAPY | Facility: CLINIC | Age: 66
End: 2021-05-06

## 2021-05-06 DIAGNOSIS — M79.604 PAIN IN BOTH LOWER EXTREMITIES: Primary | ICD-10-CM

## 2021-05-06 DIAGNOSIS — M79.605 PAIN IN BOTH LOWER EXTREMITIES: Primary | ICD-10-CM

## 2021-05-06 PROCEDURE — 97162 PT EVAL MOD COMPLEX 30 MIN: CPT | Performed by: PHYSICAL THERAPIST

## 2021-05-06 PROCEDURE — 97110 THERAPEUTIC EXERCISES: CPT | Performed by: PHYSICAL THERAPIST

## 2021-05-06 NOTE — PROGRESS NOTES
Physical Therapy Initial Evaluation and Plan of Care      Patient: Kt Davis   : 1955  Diagnosis/ICD-10 Code:  Pain in both lower extremities [M79.604, M79.605]  Referring practitioner: Tani Vazquez MD  Date of Initial Visit: 2021  Today's Date: 2021  Patient seen for 1 sessions           Subjective Questionnaire: LEFS: 42/80  Subjective Evaluation    History of Present Illness  Mechanism of injury: Several years of R hip pn. End of , had done some work on a ladder, developed pn throughout the R leg including his thigh and shin.Was referred to ortho who recommend an eventual CRUZ. Was prescribed Diclofenac by his PCP which has helped quite a bit w/ his R hip pn. However has continued to have generalized pn in both legs. Reports pn B shins/knees R>L, at times along his anterior thigh and what feels like his distal quad. When pn is bad he has to pull himself up stairs using handrails. Usually a dull ache, sharp w/ trying to stand from a chair or crossing his legs. Denies N/T, gait instability.    Subjective comment: BLE pain  Patient Occupation: Retired.  Enjoys walking, yard work, wood working, house repair Quality of life: good    Pain  Current pain ratin  At best pain ratin  At worst pain ratin  Quality: dull ache  Relieving factors: rest  Aggravating factors: stairs, standing and ambulation  Progression: no change    Social Support  Lives in: multiple-level home    Hand dominance: right    Treatments  Previous treatment: medication  Current treatment: medication  Patient Goals  Patient goals for therapy: decreased pain, increased motion, increased strength, independence with ADLs/IADLs and return to sport/leisure activities           Treatment  Exercise 1  Exercise Name 1: review/practice HEP         Objective          Neurological Testing     Sensation     Lumbar   Left   Intact: light touch    Right   Intact: light touch    Reflexes   Left   Patellar (L4): normal  (2+)  Achilles (S1): trace (1+)    Right   Patellar (L4): normal (2+)  Achilles (S1): trace (1+)    Active Range of Motion   Left Hip   Flexion: 100 degrees   Abduction: 40 degrees   External rotation (90/90): 30 degrees   Internal rotation (90/90): 24 degrees     Right Hip   Flexion: 100 degrees   Abduction: 40 degrees   External rotation (90/90): 32 degrees   Internal rotation (90/90): 22 degrees   Left Knee   Flexion: 140 degrees with pain  Extension: 0 degrees     Right Knee   Flexion: 140 degrees with pain  Extension: 0 degrees     Additional Active Range of Motion Details  Trunk Flxn 75% without pn  Trunk Extn 75% without pn  Trunk Rotation L 100%  R 100% without pn  Trunk SB L fingertips to KJL   R fingertips to KJL without pn    Ant knee pn w/ flxn    Patellar Mobility   Left Knee Patellar tendons within functional limits include the medial, lateral, superior and inferior.     Right Knee Patellar tendons within functional limits include the medial, lateral, superior and inferior.     Strength/Myotome Testing     Left Hip   Planes of Motion   Flexion: 5  Extension: 4-  Abduction: 4+  External rotation: 5    Right Hip   Planes of Motion   Flexion: 5  Extension: 4-  Abduction: 4+  External rotation: 5    Left Knee   Flexion: 5  Extension: 4+    Right Knee   Flexion: 5  Extension: 5    Left Ankle/Foot   Dorsiflexion: 5  Great toe extension: 4+    Right Ankle/Foot   Dorsiflexion: 5  Great toe extension: 4+    Tests     Left Hip   Positive LANCE (ant/lat hip pn) and FADIR.   Negative scour.     Right Hip   Positive LANCE (ant/lat hip pn), FADIR and scour.     Left Knee   Negative anterior drawer, lateral Hari, medial Hari and posterior drawer.     Right Knee   Negative anterior drawer, lateral Hari, medial Hari and posterior drawer.     Left Hip Flexibility Comments:   Hamstring: moderate impairment  Quad: mild impairment  Hip Flexor: mild impairment  Piriformis: moderate impairment  IT Band: mild  impairment  Gastroc: mild impairment  Soleus: mild impairment      Right Hip Flexibility Comments:   Hamstring: moderate impairment  Quad: mild impairment  Hip Flexor: mild impairment  Piriformis: moderate impairment  IT Band: mild impairment  Gastroc: mild impairment  Soleus: mild impairment      Ambulation     Comments   Ambulates independently, exhibits shortened stride length, decreased stance time on RLE.          Assessment & Plan     Assessment  Impairments: abnormal gait, abnormal or restricted ROM, activity intolerance, impaired physical strength, lacks appropriate home exercise program and pain with function  Assessment details: Pt is a 66 YOM who presents to PT w/ evolving symptoms of moderate complexity. Findings most consistent w/ B mechanical knee pn. Pt's primary complaints are B knee, shin, and thigh pn that is worst w/ stair navigation and performing transfers, improved w/ sitting and rest. He exhibits functional trunk mobility, hip/knee ROM but reports increased knee pn w/ active knee fln/extn. Neuro screen unremarkable, symptoms unchanged by repeated or sustained flxn/extn. Strength mostly intact, but pt exhibits marked deficits in LE flexibility, altered gait/transfer mechanics. Pt's pn and deficits limit tolerance to daily and recreational activities. Pt would benefit from skilled PT services to address deficits, decrease pn, and maximize function.  Prognosis: good  Functional Limitations: walking, uncomfortable because of pain, standing and stooping  Goals  Plan Goals: STG 4 wks  1) Pt to be compliant w/ initial HEP for ROM, strength and symptom mgmt.  2) Pt to report pn w/ daily activities no greater than 6/10.  3) Pt to report little to no pn w/ active knee ROM.  4) Pt to improve LEFS score to 48/80 or better to reflect improved pn and function.    LTG 8 wks  1) Pt to be independent w/ long term HEP and self mgmt.  2) Pt to report pn w/ daily activities no greater than 3/10.  3) Pt to exhibit  improved LE flexibility from baseline measures.  4) Pt to improve LEFS score to 54/80 or better to reflect improved pn and function.      Plan  Therapy options: will be seen for skilled physical therapy services  Planned modality interventions: cryotherapy, TENS, thermotherapy (hydrocollator packs), ultrasound and dry needling  Planned therapy interventions: balance/weight-bearing training, flexibility, functional ROM exercises, gait training, home exercise program, joint mobilization, manual therapy, neuromuscular re-education, soft tissue mobilization, strengthening, stretching, therapeutic activities and transfer training  Frequency: 2x week  Duration in visits: 15  Treatment plan discussed with: patient  Plan details: TE/TA/NMR/MT/gait training to address noted deficits, modalities as indicated for pn control        Timed:  Manual Therapy:    0     mins  71864;  Therapeutic Exercise:    10     mins  19497;     Neuromuscular Cristhian:    0    mins  33149;    Therapeutic Activity:     0     mins  73388;     Gait Trainin     mins  06678;     Ultrasound:     0     mins  75659;    Electrical Stimulation:    0     mins  29079 ( );    Untimed:  Electrical Stimulation:    0     mins  20778 ( );  Mechanical Traction:    0     mins  38754;     Timed Treatment:   10   mins   Total Treatment:     45   mins    PT SIGNATURE: Jacinta Chris, PT   DATE TREATMENT INITIATED: 2021    Initial Certification  Certification Period: 2021  I certify that the therapy services are furnished while this patient is under my care.  The services outlined above are required by this patient, and will be reviewed every 90 days.     PHYSICIAN: Tani Vazquez MD      DATE:     Please sign and return via fax to [phone]. Thank you, [facility].

## 2021-05-06 NOTE — PATIENT INSTRUCTIONS
Access Code: [email or web address]    URL: [email or web address]  Date: 05/06/2021  Prepared by: Jacinta Chris    Exercises  Hip Flexor Stretch on Step - 1-2 x daily - 3 reps - 20 sec hold  Seated Hamstring Stretch - 1-2 x daily - 3 reps - 20 sec hold  Prone Knee Flexion AAROM with Overpressure - 1-2 x daily - 3 reps - 20 sec hold

## 2021-05-13 ENCOUNTER — TREATMENT (OUTPATIENT)
Dept: PHYSICAL THERAPY | Facility: CLINIC | Age: 66
End: 2021-05-13

## 2021-05-13 DIAGNOSIS — M79.605 PAIN IN LEFT LEG: ICD-10-CM

## 2021-05-13 DIAGNOSIS — M79.604 PAIN IN RIGHT LEG: Primary | ICD-10-CM

## 2021-05-13 PROCEDURE — 97530 THERAPEUTIC ACTIVITIES: CPT | Performed by: PHYSICAL THERAPIST

## 2021-05-13 PROCEDURE — 97110 THERAPEUTIC EXERCISES: CPT | Performed by: PHYSICAL THERAPIST

## 2021-05-13 NOTE — PROGRESS NOTES
Physical Therapy Daily Progress Note    Patient: Kt Davis   : 1955  Diagnosis/ICD-10 Code:  Pain in right leg [M79.604]  Referring practitioner: Tani Vazquez MD  Date of Initial Visit: Type: THERAPY  Noted: 2021  Today's Date: 2021  Patient seen for 2 sessions         Kt Davis reports having pain in the thighs and knees, and occasionally in the right shin that has not improved since last visit.      Subjective     Objective   See Exercise, Manual, and Modality Logs for complete treatment.     *DTRs:  2+ patellar tendons B/L  *(-) Delacruz and clonus B/L    Assessment/Plan  Extensive discussion regarding participation in ADLs/iADLs as it relates to the B/L thigh and shin pain.  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  Including ladder work.  Upgraded HEP with written and verbal instruction (time included in total time billed as TE below).       Manual Therapy:         mins  46210;  Therapeutic Exercise:    15     mins  74487;     Neuromuscular Cristhian:        mins  64969;    Therapeutic Activity:     10     mins  84639;     Gait Training:           mins  30680;     Ultrasound:          mins  09030;    Electrical Stimulation:         mins  76935 ( );  Dry Needling          mins self-pay    Timed Treatment:   25   mins   Total Treatment:     25   mins    Navdeep Stone PT  Physical Therapist

## 2021-05-20 ENCOUNTER — TREATMENT (OUTPATIENT)
Dept: PHYSICAL THERAPY | Facility: CLINIC | Age: 66
End: 2021-05-20

## 2021-05-20 DIAGNOSIS — M25.561 CHRONIC PAIN OF BOTH KNEES: Primary | ICD-10-CM

## 2021-05-20 DIAGNOSIS — M25.562 CHRONIC PAIN OF BOTH KNEES: Primary | ICD-10-CM

## 2021-05-20 DIAGNOSIS — G89.29 CHRONIC PAIN OF BOTH KNEES: Primary | ICD-10-CM

## 2021-05-20 PROCEDURE — 97110 THERAPEUTIC EXERCISES: CPT | Performed by: PHYSICAL THERAPIST

## 2021-05-20 PROCEDURE — 97530 THERAPEUTIC ACTIVITIES: CPT | Performed by: PHYSICAL THERAPIST

## 2021-05-21 NOTE — PROGRESS NOTES
Physical Therapy Daily Progress Note    Patient: Kt Davis   : 1955  Diagnosis/ICD-10 Code:  Chronic pain of both knees [M25.561, M25.562, G89.29]  Referring practitioner: Tani Vazquez MD  Date of Initial Visit: Type: THERAPY  Noted: 2021  Today's Date: 2021  Patient seen for 3 sessions         Kt Davis reports that his shin pain is gone.  Has no thigh pain on the right.  Has more stiffness in the left thigh.  Pain is focal to both knees now.  Sleep quality/duration has improved.    Subjective     Objective   See Exercise, Manual, and Modality Logs for complete treatment.       Assessment/Plan  Focused on improving proximal hip strength and loading tolerance to tension of LQ soft tissues.  Taped B/L arches to reduce aberrant loading throughout B/L LEs.  Recommended custom orthotics.    Updated HEP with written and verbal instruction (included in total time billed as TE).       Manual Therapy:         mins  24331;  Therapeutic Exercise:    25     mins  78964;     Neuromuscular Cristhian:        mins  24561;    Therapeutic Activity:     14     mins  18650;     Gait Training:           mins  11219;     Ultrasound:          mins  49816;    Electrical Stimulation:         mins  94410 ( );  Dry Needling          mins self-pay    Timed Treatment:   39   mins   Total Treatment:     39   mins    Navdeep Stone PT  Physical Therapist

## 2021-05-27 ENCOUNTER — TREATMENT (OUTPATIENT)
Dept: PHYSICAL THERAPY | Facility: CLINIC | Age: 66
End: 2021-05-27

## 2021-05-27 DIAGNOSIS — M25.561 CHRONIC PAIN OF BOTH KNEES: Primary | ICD-10-CM

## 2021-05-27 DIAGNOSIS — M25.562 CHRONIC PAIN OF BOTH KNEES: Primary | ICD-10-CM

## 2021-05-27 DIAGNOSIS — G89.29 CHRONIC PAIN OF BOTH KNEES: Primary | ICD-10-CM

## 2021-05-27 PROCEDURE — 97112 NEUROMUSCULAR REEDUCATION: CPT | Performed by: PHYSICAL THERAPIST

## 2021-05-27 PROCEDURE — 97110 THERAPEUTIC EXERCISES: CPT | Performed by: PHYSICAL THERAPIST

## 2021-05-27 NOTE — PROGRESS NOTES
Physical Therapy Daily Progress Note  Visit: 4      Patient: Kt Davis   : 1955  Referring practitioner: Tani Vazquez MD  Visit Diagnosis:     ICD-10-CM ICD-9-CM   1. Chronic pain of both knees  M25.561 719.46    M25.562 338.29    G89.29      Date of Initial Visit: Type: THERAPY  Noted: 2021  Today's Date: 2021        Subjective     Kt Davis reports: No noted improvement w/ foot taping from last visit. Travelled 7 hours in car to [place] last weekend, climbed a lot of stairs. After returning from trip had quite a bit of thigh/knee pn. Also noticed occasional sharp shooting pn from L knee to groin while walking. Not noticing today. Has taken Diclofenac daily since, and today has felt the best he has in a while.     Treatment  Pre Treatment Pn Score: 0  Post Treatment Pn Score:  0      Exercise 1  Exercise Name 1: prone quad stretch  Sets/Reps 1: 2  Time: 15 sec  Exercise 2  Exercise Name 2: clamshells  Equipment/Resistance 2: blue TB  Sets/Reps 2: 30  Exercise 3  Exercise Name 3: bridge w/ exs ball  Sets/Reps 3: 30  Exercise 4  Exercise Name 4: standing hip abd  Equipment/Resistance 4: RTB  Sets/Reps 4: 20 ea  Exercise 5  Exercise Name 5: lunge hip flexor stretch  Sets/Reps 5: 2  Time 5: 15 sec  Exercise 6  Exercise Name 6: stairs  Equipment/Resistance 6: 1 flight  Exercise 7  Exercise Name 7: TG squats  Sets/Reps 7: 30                    Objective      Noted mild Trendelenburg gait pattern during R stance     Ascends/descends stairs reciprocally w/ use of HR, no deviation or pn       Assessment & Plan     Assessment  Assessment details: Recent symptom exacerbation after road trip and navigating several stairs. Improved today. Pn more localized to knees recently. Continued today w/ stretching and hip stabilization/strengthening. Incorporated hip abductor strengthening, TG squats, and hip flexor stretching in addition to previous exercises. He had great tolerance to exercise  today, noting fatigue but little to no joint discomfort. Issued RTB loop and recommended adding hip abd, hip flexor stretch to HEP.    Plan  Plan details: reassess               Timed:  Manual Therapy:    0     mins  91558;  Therapeutic Exercise:    34     mins  68539;     Neuromuscular Cristhian:    8    mins  48972;    Therapeutic Activity:     0     mins  95701;     Gait Trainin     mins  94181;     Ultrasound:     0     mins  31987;    Electrical Stimulation:    0     mins  63620 ( );    Untimed:  Electrical Stimulation:    0     mins  60807 ( );  Mechanical Traction:    0     mins  36803;     Timed Treatment:   42   mins   Total Treatment:     42   mins      Jacinta Chris, PT  Physical Therapist

## 2021-06-03 ENCOUNTER — TREATMENT (OUTPATIENT)
Dept: PHYSICAL THERAPY | Facility: CLINIC | Age: 66
End: 2021-06-03

## 2021-06-03 DIAGNOSIS — M25.562 CHRONIC PAIN OF BOTH KNEES: Primary | ICD-10-CM

## 2021-06-03 DIAGNOSIS — G89.29 CHRONIC PAIN OF BOTH KNEES: Primary | ICD-10-CM

## 2021-06-03 DIAGNOSIS — M25.561 CHRONIC PAIN OF BOTH KNEES: Primary | ICD-10-CM

## 2021-06-03 PROCEDURE — 97110 THERAPEUTIC EXERCISES: CPT | Performed by: PHYSICAL THERAPIST

## 2021-06-03 PROCEDURE — 97530 THERAPEUTIC ACTIVITIES: CPT | Performed by: PHYSICAL THERAPIST

## 2021-06-03 NOTE — PROGRESS NOTES
Re-Assessment / Re-Certification      Patient: Kt Davis   : 1955  Diagnosis/ICD-10 Code:  Chronic pain of both knees [M25.561, M25.562, G89.29]  Referring practitioner: Tani Vazquez MD  Date of Initial Visit: Type: THERAPY  Noted: 2021  Today's Date: 6/3/2021  Patient seen for 5 sessions      Subjective:   Subjective Questionnaire: LEFS: 47/80  Clinical Progress: improved  Home Program Compliance: Yes  Treatment has included: therapeutic exercise, neuromuscular re-education, manual therapy, therapeutic activity and dry needling    Subjective    Kt Davis reports: Pt states that overall he is somewhat better. Was on his feet for several hours yesterday shopping and was difficult. Noticed stiffness. Pn up to 5/10. Currently not having pn but reports stiffness in B thins, knees, and shins.    Pre tx pn score: 0  Post tx pn score: 0      Treatment  Exercise 1  Exercise Name 1: Reassessment  Exercise 2  Exercise Name 2: review/practice HEP, discussion POC, education re DN         Dry Needling 1  Region/Structure 1: VL/VMO-bilat  Technique 1: in/out; supine w/ bolster under knees  Depth 1: 1 in  Needle Size 1: 1 in  Quantity of Needles 1: 4  Response 1: tolerated well      Objective          Neurological Testing     Sensation     Lumbar   Left   Intact: light touch    Right   Intact: light touch    Reflexes   Left   Patellar (L4): normal (2+)  Achilles (S1): trace (1+)    Right   Patellar (L4): normal (2+)  Achilles (S1): trace (1+)    Active Range of Motion   Left Hip   Flexion: 100 degrees   Abduction: 40 degrees   External rotation (90/90): 30 degrees   Internal rotation (90/90): 24 degrees     Right Hip   Flexion: 100 degrees   Abduction: 40 degrees   External rotation (90/90): 32 degrees   Internal rotation (90/90): 22 degrees   Left Knee   Flexion: 140 degrees with pain  Extension: 0 degrees     Right Knee   Flexion: 140 degrees with pain  Extension: 0 degrees     Additional Active Range  of Motion Details  Trunk Flxn 75% without pn  Trunk Extn 75% without pn  Trunk Rotation L 100% R 100% without pn  Trunk SB L fingertips to KJL   R fingertips to KJL without pn    Ant knee pn w/ flxn    Patellar Mobility   Left Knee Patellar tendons within functional limits include the medial, lateral, superior and inferior.     Right Knee Patellar tendons within functional limits include the medial, lateral, superior and inferior.     Strength/Myotome Testing     Left Hip   Planes of Motion   Flexion: 5  Extension: 4  Abduction: 4+  External rotation: 5    Right Hip   Planes of Motion   Flexion: 5  Extension: 4  Abduction: 4+  External rotation: 4-    Left Knee   Flexion: 5  Extension: 4+    Right Knee   Flexion: 5  Extension: 5    Left Ankle/Foot   Dorsiflexion: 5  Great toe extension: 5    Right Ankle/Foot   Dorsiflexion: 5  Great toe extension: 5    Tests     Left Hip   Positive LANCE (ant/lat hip pn) and FADIR.   Negative scour.     Right Hip   Positive LANCE (ant/lat hip pn), FADIR and scour.     Left Knee   Negative anterior drawer, lateral Hari, medial Hari and posterior drawer.     Right Knee   Negative anterior drawer, lateral Hari, medial Hari and posterior drawer.     Left Hip Flexibility Comments:   Hamstring: moderate impairment  Quad: mild impairment  Hip Flexor: mild impairment  Piriformis: moderate impairment  IT Band: mild impairment  Gastroc: mild impairment  Soleus: mild impairment      Right Hip Flexibility Comments:   Hamstring: moderate impairment  Quad: mild impairment  Hip Flexor: mild impairment  Piriformis: moderate impairment  IT Band: mild impairment  Gastroc: mild impairment  Soleus: mild impairment      Ambulation     Comments   Ambulates independently, exhibits shortened stride length, decreased stance time on RLE.          Assessment & Plan     Assessment  Impairments: abnormal gait, abnormal or restricted ROM, activity intolerance, impaired physical strength, lacks  appropriate home exercise program and pain with function  Assessment details: Reassessment performed today. Pt has completed 5 PT visits w/ interventions focusing on LE strengthening and flexibility. Pt has made gains w/ both and subjectively reports gradual improvement in pn level and tolerance to daily activities. His primary deficits remain mild weakness, most pronounced in the R hip musculature, and decreased flexibility. He is tolerating exercise well. At his request initiated DN, focusing on the knee joint space and quadricep to improve blood flow and facilitate muscle function. He would benefit from continued PT to address his remaining deficits and maximize function.  Prognosis: good  Functional Limitations: walking, uncomfortable because of pain, standing and stooping  Goals  Plan Goals: STG 4 wks  1) Pt to be compliant w/ initial HEP for ROM, strength and symptom mgmt.-MET  2) Pt to report pn w/ daily activities no greater than 6/10.-MET  3) Pt to report little to no pn w/ active knee ROM.-MET  4) Pt to improve LEFS score to 48/80 or better to reflect improved pn and function.-PROGRESSING    LTG 8 wks  1) Pt to be independent w/ long term HEP and self mgmt.-PROGRESSING  2) Pt to report pn w/ daily activities no greater than 3/10.-PROGRESSING  3) Pt to exhibit improved LE flexibility from baseline measures.-PARTIALLY MET  4) Pt to improve LEFS score to 54/80 or better to reflect improved pn and function.-PROGRESSING      Plan  Plan details: Continue w/ focus on maximizing strength and flexibility; assess response to DN      Progress toward previous goals: Partially Met        PT Signature: Jacinta Chris, PT        Timed:  Manual Therapy:    0     mins  73393;  Therapeutic Exercise:    8     mins  75227;     Neuromuscular Cristhian:    0    mins  44249;    Therapeutic Activity:     30     mins  12210;     Gait Trainin     mins  34916;     Ultrasound:     0     mins  62899;    Electrical Stimulation:     0     mins  79808 ( );    Untimed:  Electrical Stimulation:    0     mins  22849 ( );  Mechanical Traction:    0     mins  16368;     Timed Treatment:   38   mins   Total Treatment:     45   mins

## 2021-06-10 ENCOUNTER — TREATMENT (OUTPATIENT)
Dept: PHYSICAL THERAPY | Facility: CLINIC | Age: 66
End: 2021-06-10

## 2021-06-10 DIAGNOSIS — M25.561 CHRONIC PAIN OF BOTH KNEES: Primary | ICD-10-CM

## 2021-06-10 DIAGNOSIS — M25.562 CHRONIC PAIN OF BOTH KNEES: Primary | ICD-10-CM

## 2021-06-10 DIAGNOSIS — G89.29 CHRONIC PAIN OF BOTH KNEES: Primary | ICD-10-CM

## 2021-06-10 PROCEDURE — 97110 THERAPEUTIC EXERCISES: CPT | Performed by: PHYSICAL THERAPIST

## 2021-06-10 PROCEDURE — 97530 THERAPEUTIC ACTIVITIES: CPT | Performed by: PHYSICAL THERAPIST

## 2021-06-10 NOTE — PROGRESS NOTES
Physical Therapy Daily Progress Note  Visit: 6      Patient: Kt Davis   : 1955  Referring practitioner: Tani Vazquez MD  Visit Diagnosis:     ICD-10-CM ICD-9-CM   1. Chronic pain of both knees  M25.561 719.46    M25.562 338.29    G89.29      Date of Initial Visit: Type: THERAPY  Noted: 2021  Today's Date: 6/10/2021        Subjective     Kt Davis reports: Knee pn is better, but continues to report stiffness ant and post thigh bilaterally. Overall has fewer bad days and pn on those days is less intense than before. Lately has noticed weakness w/ trying to stand from a kneeling/squatting position    Treatment  Pre Treatment Pn Score: 2  Post Treatment Pn Score:  0      Exercise 1  Exercise Name 1: bridge w/ hip abd  Equipment/Resistance 1: GTB  Sets/Reps 1: 30  Exercise 2  Exercise Name 2: seated hamstring stretch  Sets/Reps 2: 3  Time 2: 15 sec  Exercise 3  Exercise Name 3: lateral step up  Equipment/Resistance 3: 6 in  Sets/Reps 3: 15 ea  Exercise 4  Exercise Name 4: fwd step up  Equipment/Resistance 4: 4 in  Sets/Reps 4: 10 ea  Exercise 5  Exercise Name 5: sidestep (knees bent)  Equipment/Resistance 5: RTB below knees  Exercise 6  Exercise Name 6: review HEP             Dry Needling 1  Region/Structure 1: VL/VMO-bilat  Technique 1: in/out; seated  Depth 1: 1 in  Needle Size 1: 1 in  Quantity of Needles 1: 4  Response 1: tolerated well    Dry Needling 2  Region/Structure 2: med/lat knee joint space-bilat  Technique 2: in/out  Depth 2: 1 in  Needle Size 2: 1 in  Quantity of Needles 2: 4      Objective      No TTP KJL bilaterally  Mild TTP VL bilaterally       Assessment & Plan     Assessment  Assessment details: Subjectively reports continued gradual improvement. No issues after DN initiated last visit, and reports improved pn along KJL. Primary complaint today is stiffness B thighs and weakness w/ standing from a squatted position. Continued today w/ strengthening progression,  focusing on B glut, quad, hamstring training. Pt noted mild L anterior knee discomfort w/ forward step up, but tolerated lateral step up w/o issue. He appeared to be quite a bit more challenged w/ today's CKC activities. No pn noted at end of visit. Issued/reviewed updated HEP.    Plan  Plan details: Cont w/ CKC strengthening, flexibility, DN               Timed:  Manual Therapy:    0     mins  63799;  Therapeutic Exercise:    24     mins  52429;     Neuromuscular Cristhian:    0    mins  24203;    Therapeutic Activity:     14     mins  85357;     Gait Trainin     mins  54309;     Ultrasound:     0     mins  04125;    Electrical Stimulation:    0     mins  88920 ( );    Untimed:  Electrical Stimulation:    0     mins  84501 ( );  Mechanical Traction:    0     mins  60949;     Timed Treatment:   38   mins   Total Treatment:     45   mins      Jacinta Chris, PT  Physical Therapist

## 2021-06-10 NOTE — PATIENT INSTRUCTIONS
Access Code: [email or web address]  URL: [email or web address]  Date: 06/10/2021  Prepared by: Jacinta Chris    Exercises  Hip Flexor Stretch on Step - 1 x daily - 3 reps - 20 sec hold  Seated Hamstring Stretch - 1 x daily - 3 reps - 20 sec hold  Prone Quadriceps Stretch with Strap - 1 x daily - 3 reps - 15-20 sec hold  Supine Bridge with Resistance Band - 1 x daily - 15-30 reps  Side Stepping with Resistance at Thighs - 1 x daily - 15-30 reps  Lateral Step Up - 1 x daily - 15-30 reps

## 2021-06-17 ENCOUNTER — TREATMENT (OUTPATIENT)
Dept: PHYSICAL THERAPY | Facility: CLINIC | Age: 66
End: 2021-06-17

## 2021-06-17 DIAGNOSIS — G89.29 CHRONIC PAIN OF BOTH KNEES: Primary | ICD-10-CM

## 2021-06-17 DIAGNOSIS — M25.562 CHRONIC PAIN OF BOTH KNEES: Primary | ICD-10-CM

## 2021-06-17 DIAGNOSIS — M25.561 CHRONIC PAIN OF BOTH KNEES: Primary | ICD-10-CM

## 2021-06-17 PROCEDURE — 97530 THERAPEUTIC ACTIVITIES: CPT | Performed by: PHYSICAL THERAPIST

## 2021-06-17 PROCEDURE — 97112 NEUROMUSCULAR REEDUCATION: CPT | Performed by: PHYSICAL THERAPIST

## 2021-06-17 PROCEDURE — 97110 THERAPEUTIC EXERCISES: CPT | Performed by: PHYSICAL THERAPIST

## 2021-06-17 NOTE — PROGRESS NOTES
Physical Therapy Daily Progress Note  Visit: 7      Patient: Kt Davis   : 1955  Referring practitioner: Tani Vazquez MD  Visit Diagnosis:     ICD-10-CM ICD-9-CM   1. Chronic pain of both knees  M25.561 719.46    M25.562 338.29    G89.29      Date of Initial Visit: Type: THERAPY  Noted: 2021  Today's Date: 2021        Subjective     Kt Davis reports: Felt good after his last visit. No pn for a few days. Saturday walked around 4 miles and had no problem, which is unusual for him.  felt ok but did have to take a couple of tylenol. Monday had a bad day, experienced quite a bit of pn L thigh, knee, shin. Unsure of any unusual activity that would have provoked it. Currently reports discomfort deep L ant thigh, worse w/ WBing. Rarely has issues w/ sitting.    Treatment  Pre Treatment Pn Score: 4-5  Post Treatment Pn Score:  3      Exercise 1  Exercise Name 1: bridge w/ hip abd  Equipment/Resistance 1: GTB  Sets/Reps 1: 30  Exercise 2  Exercise Name 2: bridge w/ alternating knee extn  Sets/Reps 2: 20 ea  Exercise 3  Exercise Name 3: hip hikes  Sets/Reps 3: 20  Exercise 4  Exercise Name 4: hip burners-neutral  Sets/Reps 4: 10 ea  Exercise 5  Exercise Name 5: wall ball squat  Sets/Reps 5: 30  Exercise 6  Exercise Name 6: lateral step up  Equipment/Resistance 6: 8 in  Sets/Reps 6: 30 ea  Exercise 7  Exercise Name 7: forward step down  Equipment/Resistance 7: 4 in  Sets/Reps 7: 15 ea             Dry Needling 1  Region/Structure 1: VL/VMO-bilat  Technique 1: in/out; seated  Depth 1: 1 in  Needle Size 1: 1 in  Quantity of Needles 1: 6  Response 1: tolerated well  Dry Needling 2  Region/Structure 2: med/lat knee joint space-bilat  Technique 2: in/out  Depth 2: 1 in  Needle Size 2: 1 in  Quantity of Needles 2: 4      Objective         Assessment & Plan     Assessment  Assessment details: Pt reported reduced pn/stiffness for a few days after his last PT appt, even with walking 4 miles.  However had increased pn late in the weekend w/o any change in activity that he is aware of. Continued today w/ focus on CKC strengthening, targeting glut med/glut max/quad activation to improve gait mechanics, load/activity tolerance. Pt subjectively reported improved gait mechanics after bridge exercises on plinth table. He continues to demonstrate a trendelenburg pattern on his R. Discussed adding hip burners at wall to HEP.    Plan  Plan details: Cont w/ POC-may consider estim w/ DN               Timed:  Manual Therapy:    0     mins  15141;  Therapeutic Exercise:    20     mins  49701;     Neuromuscular Cristhian:    8    mins  92528;    Therapeutic Activity:     15     mins  64072;     Gait Trainin     mins  39128;     Ultrasound:     0     mins  71919;    Electrical Stimulation:    0     mins  81873 ( );    Untimed:  Electrical Stimulation:    0     mins  45435 ( );  Mechanical Traction:    0     mins  49894;     Timed Treatment:   43   mins   Total Treatment:     50   mins      Jacinta Chris, PT  Physical Therapist

## 2021-06-24 ENCOUNTER — TREATMENT (OUTPATIENT)
Dept: PHYSICAL THERAPY | Facility: CLINIC | Age: 66
End: 2021-06-24

## 2021-06-24 DIAGNOSIS — G89.29 CHRONIC PAIN OF BOTH KNEES: Primary | ICD-10-CM

## 2021-06-24 DIAGNOSIS — M25.562 CHRONIC PAIN OF BOTH KNEES: Primary | ICD-10-CM

## 2021-06-24 DIAGNOSIS — M25.561 CHRONIC PAIN OF BOTH KNEES: Primary | ICD-10-CM

## 2021-06-24 PROCEDURE — 97110 THERAPEUTIC EXERCISES: CPT | Performed by: PHYSICAL THERAPIST

## 2021-06-24 PROCEDURE — 97112 NEUROMUSCULAR REEDUCATION: CPT | Performed by: PHYSICAL THERAPIST

## 2021-06-24 NOTE — PROGRESS NOTES
"   Physical Therapy Daily Progress Note  Visit: 8      Patient: Kt Davis   : 1955  Referring practitioner: Tani Vazquez MD  Visit Diagnosis:     ICD-10-CM ICD-9-CM   1. Chronic pain of both knees  M25.561 719.46    M25.562 338.29    G89.29      Date of Initial Visit: Type: THERAPY  Noted: 2021  Today's Date: 2021        Subjective     Kt Davis reports: Felt slightly stiff after his last visit but no soreness. Feels that the strength in his legs varies w/ different activities and from day to day. Has not had any L anterior thigh pn but has noticed over the past couple of days more L knee pn. No \"horrible days\" over the past week.    Treatment  Pre Treatment Pn Score: 4  Post Treatment Pn Score:  0      Exercise 1  Exercise Name 1: hip hikes off step edge  Sets/Reps 1: 2x10  Exercise 2  Exercise Name 2: lateral step up  Equipment/Resistance 2: 8 in  Sets/Reps 2: 25  Exercise 3  Exercise Name 3: hip burners  Sets/Reps 3: 10 ea  Exercise 4  Exercise Name 4: hip extn  Equipment/Resistance 4: YTB  Sets/Reps 4: 25   Exercise 5  Exercise Name 5: wall ball squat  Sets/Reps 5: 25      Manual Rx 1  Manual Rx 1 Location: manual R piriformis stretch  Manual Rx 1 Duration: 2 min      Dry Needling 1  Region/Structure 1: L med/lat knee joint line  Technique 1: in/out  Depth 1: 1 in  Needle Size 1: 1 in  Quantity of Needles 1: 3  Response 1: tolerated well      Objective      L knee pn w/ initial contact    R hip abd 4/5; L hip abd 5/5       Assessment & Plan     Assessment  Assessment details: Pt tolerated tx session well. Pn more localized to L knee since last visit. Pt continues to demo weakness in the R hip abductors, contributing to Trendelenburg gait pattern. He reports L knee pn upon initial contact w/ ground during gait, more prominent when limp more pronounced, reduced as gait mechanics improve throughout tx visit. Focused today on hip abduction strengthening and facilitation of " stability/neuromuscular hip control. No pn noted at end of visit.    Plan  Plan details: Reassess next visit               Timed:  Manual Therapy:    2     mins  91105;  Therapeutic Exercise:    25     mins  01989;     Neuromuscular Cristhian:    12    mins  36427;    Therapeutic Activity:     0     mins  32310;     Gait Trainin     mins  36980;     Ultrasound:     0     mins  48472;    Electrical Stimulation:    0     mins  80646 ( );      Untimed:  Electrical Stimulation:    0     mins  98975 ( );  Mechanical Traction:    0     mins  35352;     Timed Treatment:   39   mins   Total Treatment:     45   mins      Jacinta Chris, PT  Physical Therapist

## 2021-07-02 ENCOUNTER — TREATMENT (OUTPATIENT)
Dept: PHYSICAL THERAPY | Facility: CLINIC | Age: 66
End: 2021-07-02

## 2021-07-02 DIAGNOSIS — G89.29 CHRONIC PAIN OF BOTH KNEES: Primary | ICD-10-CM

## 2021-07-02 DIAGNOSIS — M25.562 CHRONIC PAIN OF BOTH KNEES: Primary | ICD-10-CM

## 2021-07-02 DIAGNOSIS — M25.561 CHRONIC PAIN OF BOTH KNEES: Primary | ICD-10-CM

## 2021-07-02 PROCEDURE — 97110 THERAPEUTIC EXERCISES: CPT | Performed by: PHYSICAL THERAPIST

## 2021-07-02 NOTE — PROGRESS NOTES
Re-Assessment / Re-Certification      Patient: Kt Davis   : 1955  Diagnosis/ICD-10 Code:  Chronic pain of both knees [M25.561, M25.562, G89.29]  Referring practitioner: Tani Vazquez MD  Date of Initial Visit: Type: THERAPY  Noted: 2021  Today's Date: 2021  Patient seen for 9 sessions      Subjective:   Subjective Questionnaire: LEFS: 47/80  Clinical Progress: improved  Home Program Compliance: Yes  Treatment has included: therapeutic exercise, neuromuscular re-education, manual therapy, therapeutic activity and dry needling    Subjective    Kt Davis reports: States that he is doing about the same as last week. Pn seems to be most prominent in the L knee but radiates into front of his thigh as well. Pn is worst when walking. Does not bother him at rest. Also seems to be more intense when he walks with a limp.    Pre tx pn score: 2-3  Post tx pn score: 2      Treatment  Exercise 1  Exercise Name 1: Reassessment  Exercise 2  Exercise Name 2: hip flexor stretching-lunge, modified marlena, dynamic w/ long stride walking  Exercise 3  Exercise Name 3: piriformis stretch-EOB  Exercise 4  Exercise Name 4: review HEP         Functional Testing  Functional Tests Options: Lower Extremity Functional Index         Objective          Neurological Testing     Sensation     Lumbar   Left   Intact: light touch    Right   Intact: light touch    Reflexes   Left   Patellar (L4): normal (2+)  Achilles (S1): trace (1+)    Right   Patellar (L4): normal (2+)  Achilles (S1): trace (1+)    Active Range of Motion   Left Hip   Flexion: 100 degrees   Abduction: 40 degrees   External rotation (90/90): 46 degrees   Internal rotation (90/90): 25 degrees with pain    Right Hip   Flexion: 100 degrees   Abduction: 40 degrees   External rotation (90/90): 42 degrees   Internal rotation (90/90): 26 degrees with pain  Left Knee   Flexion: 140 degrees with pain  Extension: 0 degrees     Right Knee   Flexion: 140 degrees with  pain  Extension: 0 degrees     Additional Active Range of Motion Details  Trunk Flxn 75% without pn  Trunk Extn 75% without pn  Trunk Rotation L 100% R 100% without pn  Trunk SB L fingertips to KJL   R fingertips to KJL without pn    Ant knee pn w/ flxn    Patellar Mobility   Left Knee Patellar tendons within functional limits include the medial, lateral, superior and inferior.     Right Knee Patellar tendons within functional limits include the medial, lateral, superior and inferior.     Strength/Myotome Testing     Left Hip   Planes of Motion   Flexion: 5  Extension: 5  Abduction: 5  External rotation: 5    Right Hip   Planes of Motion   Flexion: 5  Extension: 5  Abduction: 4+  External rotation: 5    Left Knee   Flexion: 5  Extension: 5    Right Knee   Flexion: 5  Extension: 5    Left Ankle/Foot   Dorsiflexion: 5  Great toe extension: 5    Right Ankle/Foot   Dorsiflexion: 5  Great toe extension: 5    Tests     Left Hip   Positive LANCE (ant/lat hip pn) and FADIR.   Negative scour.     Right Hip   Positive LANCE (ant/lat hip pn), FADIR and scour.     Left Knee   Negative anterior drawer, lateral Hari, medial Hari and posterior drawer.     Right Knee   Negative anterior drawer, lateral Hari, medial Hari and posterior drawer.     Left Hip Flexibility Comments:   Hamstring: moderate impairment  Quad: mild impairment  Hip Flexor: severe impairment  Piriformis: moderate impairment  IT Band: mild impairment  Gastroc: mild impairment  Soleus: mild impairment      Right Hip Flexibility Comments:   Hamstring: moderate impairment  Quad: mild impairment  Hip Flexor: mod impairment  Piriformis: moderate impairment  IT Band: mild impairment  Gastroc: mild impairment  Soleus: mild impairment      Ambulation     Comments   Ambulates independently, exhibits shortened stride length due to decreased hip extn RLE, decreased stance time on RLE.          Assessment & Plan     Assessment  Assessment details:  Reassessment performed today. Pt has completed 9 PT visits w/ interventions focusing on B hip/knee strengthening, gait mechanics, and flexibility. He exhibits significant improvements in LE strength and ROM, and subjectively reports gradual improvement in pn. Recently his primary complaints have been localize L knee pn w/ gait, occasionally radiating into the anterior thigh. Knee pn likely a result of altered gait mechanics, specifically by shortened stride length that is 2/2 profound hip flexor tightness bilaterally. Given his recent improvement in hip strength, discussed focusing on daily stretching in attempt to restore normal hip osteokinematics and gait mechanics. Issued/reviewed stretching program. Pt verbalized understanding. He would benefit from ongoing PT services.    Goals  Plan Goals: STG 4 wks  1) Pt to be compliant w/ initial HEP for ROM, strength and symptom mgmt.-MET  2) Pt to report pn w/ daily activities no greater than 6/10.-MET  3) Pt to report little to no pn w/ active knee ROM.-MET  4) Pt to improve LEFS score to 48/80 or better to reflect improved pn and function.-PROGRESSING    LTG 8 wks  1) Pt to be independent w/ long term HEP and self mgmt.-PROGRESSING  2) Pt to report pn w/ daily activities no greater than 3/10.-PROGRESSING  3) Pt to exhibit improved B hip flexor length from baseline measures.-PARTIALLY MET  4) Pt to improve LEFS score to 54/80 or better to reflect improved pn and function.-PROGRESSING    Plan  Plan details: Cont w/ focus on restoration of full hip extn ROM, normalizing gait mechanics, LE strengthening.      Progress toward previous goals: Partially Met        PT Signature: Jacnita Chris, PT        Timed:  Manual Therapy:    0     mins  41587;  Therapeutic Exercise:    40     mins  45458;     Neuromuscular Cristhian:    0    mins  00065;    Therapeutic Activity:     0     mins  46529;     Gait Trainin     mins  42309;     Ultrasound:     0     mins  09363;     Electrical Stimulation:    0     mins  41704 ( );    Untimed:  Electrical Stimulation:    0     mins  34228 ( );  Mechanical Traction:    0     mins  43843;     Timed Treatment:   40   mins   Total Treatment:     40   mins

## 2021-07-08 ENCOUNTER — TREATMENT (OUTPATIENT)
Dept: PHYSICAL THERAPY | Facility: CLINIC | Age: 66
End: 2021-07-08

## 2021-07-08 DIAGNOSIS — M25.561 CHRONIC PAIN OF BOTH KNEES: Primary | ICD-10-CM

## 2021-07-08 DIAGNOSIS — M25.562 CHRONIC PAIN OF BOTH KNEES: Primary | ICD-10-CM

## 2021-07-08 DIAGNOSIS — G89.29 CHRONIC PAIN OF BOTH KNEES: Primary | ICD-10-CM

## 2021-07-08 PROCEDURE — 97110 THERAPEUTIC EXERCISES: CPT | Performed by: PHYSICAL THERAPIST

## 2021-07-08 PROCEDURE — 97530 THERAPEUTIC ACTIVITIES: CPT | Performed by: PHYSICAL THERAPIST

## 2021-07-08 NOTE — PROGRESS NOTES
"   Physical Therapy Daily Progress Note  Visit: 10      Patient: Kt Davis   : 1955  Referring practitioner: Tani Vazquez MD  Visit Diagnosis:     ICD-10-CM ICD-9-CM   1. Chronic pain of both knees  M25.561 719.46    M25.562 338.29    G89.29      Date of Initial Visit: Type: THERAPY  Noted: 2021  Today's Date: 2021        Subjective     Kt Davis reports: Pt states that initiating piriformis stretch at edge of table seemed to irritate his knee. Otherwise pn has been fairly mild since his last visit. He feels that he has a good grasp on his exercises and feels comfortable managing his current symptoms. Requests trial of independent HEP.    Treatment  Pre Treatment Pn Score: 0  Post Treatment Pn Score:  0      Exercise 1  Exercise Name 1: modified marlena hip flexor stretch  Sets/Reps 1: 2  Time: 15 sec  Exercise 2  Exercise Name 2: prone quad stretch w/ bolster under knee  Sets/Reps 2: 2  Time 2: 15 sec  Exercise 3  Exercise Name 3: forward step ups  Equipment/Resistance 3: 4\"/8\"  Sets/Reps 3: 10 ea  Exercise 4  Exercise Name 4: tall kneeling to standing transfer  Exercise 5  Exercise Name 5: review final HEP                    Objective         Assessment & Plan     Assessment  Assessment details: Pt requesting transition to independent [facility]. Spent today's visit reviewing final exercises to address his remaining deficits, focusing primarily on B hip flexibility but also continuing hip strengthening to prevent recurrence of previous symptoms. Discussed modifying step ups to forward facing from low height and elevating as symptoms allow given his ongoing complaint of pn/difficulty w/ stair navigation. Reviewed techniques for completing kneeling to standing transfers. Pt appears to have good understanding of current exercises, appropriate progressions, and ongoing symptom mgmt.    Plan  Plan details: Trial independent HEP               Timed:  Manual Therapy:    0     mins  " 13510;  Therapeutic Exercise:    30     mins  78712;     Neuromuscular Cristhian:    0    mins  95067;    Therapeutic Activity:     10     mins  54046;     Gait Trainin     mins  97384;     Ultrasound:     0     mins  64568;    Electrical Stimulation:    0     mins  19378 ( );    Untimed:  Electrical Stimulation:    0     mins  08661 ( );  Mechanical Traction:    0     mins  98101;     Timed Treatment:   40   mins   Total Treatment:     40   mins      Jacinta Chris, PT  Physical Therapist

## 2021-09-08 ENCOUNTER — OFFICE VISIT (OUTPATIENT)
Dept: FAMILY MEDICINE CLINIC | Facility: CLINIC | Age: 66
End: 2021-09-08

## 2021-09-08 VITALS
RESPIRATION RATE: 16 BRPM | WEIGHT: 192.8 LBS | BODY MASS INDEX: 28.56 KG/M2 | DIASTOLIC BLOOD PRESSURE: 84 MMHG | SYSTOLIC BLOOD PRESSURE: 145 MMHG | HEIGHT: 69 IN | OXYGEN SATURATION: 98 % | HEART RATE: 89 BPM | TEMPERATURE: 97.4 F

## 2021-09-08 DIAGNOSIS — I10 ESSENTIAL HYPERTENSION: Primary | ICD-10-CM

## 2021-09-08 DIAGNOSIS — R29.898 LEG HEAVINESS: ICD-10-CM

## 2021-09-08 LAB
BASOPHILS # BLD AUTO: 0.05 10*3/MM3 (ref 0–0.2)
BASOPHILS NFR BLD AUTO: 0.4 % (ref 0–1.5)
DEPRECATED RDW RBC AUTO: 40.5 FL (ref 37–54)
EOSINOPHIL # BLD AUTO: 0.1 10*3/MM3 (ref 0–0.4)
EOSINOPHIL NFR BLD AUTO: 0.8 % (ref 0.3–6.2)
ERYTHROCYTE [DISTWIDTH] IN BLOOD BY AUTOMATED COUNT: 11.6 % (ref 12.3–15.4)
HCT VFR BLD AUTO: 43.3 % (ref 37.5–51)
HGB BLD-MCNC: 15.2 G/DL (ref 13–17.7)
IMM GRANULOCYTES # BLD AUTO: 0.04 10*3/MM3 (ref 0–0.05)
IMM GRANULOCYTES NFR BLD AUTO: 0.3 % (ref 0–0.5)
LYMPHOCYTES # BLD AUTO: 1.53 10*3/MM3 (ref 0.7–3.1)
LYMPHOCYTES NFR BLD AUTO: 11.6 % (ref 19.6–45.3)
MCH RBC QN AUTO: 33.6 PG (ref 26.6–33)
MCHC RBC AUTO-ENTMCNC: 35.1 G/DL (ref 31.5–35.7)
MCV RBC AUTO: 95.6 FL (ref 79–97)
MONOCYTES # BLD AUTO: 2.21 10*3/MM3 (ref 0.1–0.9)
MONOCYTES NFR BLD AUTO: 16.8 % (ref 5–12)
NEUTROPHILS NFR BLD AUTO: 70.1 % (ref 42.7–76)
NEUTROPHILS NFR BLD AUTO: 9.24 10*3/MM3 (ref 1.7–7)
NRBC BLD AUTO-RTO: 0 /100 WBC (ref 0–0.2)
PLATELET # BLD AUTO: 203 10*3/MM3 (ref 140–450)
PMV BLD AUTO: 9.6 FL (ref 6–12)
RBC # BLD AUTO: 4.53 10*6/MM3 (ref 4.14–5.8)
WBC # BLD AUTO: 13.17 10*3/MM3 (ref 3.4–10.8)

## 2021-09-08 PROCEDURE — 85025 COMPLETE CBC W/AUTO DIFF WBC: CPT | Performed by: NURSE PRACTITIONER

## 2021-09-08 PROCEDURE — 99213 OFFICE O/P EST LOW 20 MIN: CPT | Performed by: NURSE PRACTITIONER

## 2021-09-08 PROCEDURE — 80053 COMPREHEN METABOLIC PANEL: CPT | Performed by: NURSE PRACTITIONER

## 2021-09-08 RX ORDER — LOSARTAN POTASSIUM 25 MG/1
25 TABLET ORAL DAILY
Qty: 90 TABLET | Refills: 0 | Status: SHIPPED | OUTPATIENT
Start: 2021-09-08 | End: 2021-11-09 | Stop reason: SDUPTHER

## 2021-09-08 NOTE — PROGRESS NOTES
"  Follow Up Office Visit      Patient Name: Kt Davis  : 1955   MRN: [id number]   Care Team: Patient Care Team:  Tani Vazquez MD as PCP - General (Family Medicine)    Chief Complaint:    Chief Complaint   Patient presents with   • Leg Pain     Follow up   • Med Management     blood pressure       States BP has been 120s over 80s with checks at home. States BP is always elevated at doctor office. Reports BP med increased last visit. Taking Diclofenac PRN for hip pain and was seen per ortho. Reports improvement with hip pain and rarely needs diclofenac.Had 12 weeks of physical therapy for bilateral leg pain and weakness and states weakness is worse than before physical therapy. States if he walks long distance his legs feel heavy \"like I am walking in pool\". He questions if blood pressure medication increase is causing leg issues.    Leg Pain         Subjective      Review of Systems:   Review of Systems    I have reviewed and the following portions of the patient's history were updated as appropriate: past family history, past medical history, past social history, past surgical history and problem list.    Medications:     Current Outpatient Medications:   •  Cholecalciferol (VITAMIN D3) 50 MCG (2000 UT) capsule, Take 1,000 Units by mouth Daily., Disp: , Rfl:   •  Dextromethorphan-guaiFENesin (MUCUS RELIEF DM PO), Take 1,200 mg by mouth As Needed., Disp: , Rfl:   •  diclofenac (VOLTAREN) 75 MG EC tablet, Take 1 tablet by mouth 2 (Two) Times a Day., Disp: 180 tablet, Rfl: 1  •  Multiple Vitamins-Minerals (MULTIVITAL PO), Take 1 dose by mouth Every Morning., Disp: , Rfl:   •  losartan (Cozaar) 25 MG tablet, Take 1 tablet by mouth Daily., Disp: 90 tablet, Rfl: 0    Allergies:   Allergies   Allergen Reactions   • Lisinopril Cough   • Valsartan Cough       Objective     Physical Exam  Vital Signs:   Vitals:    21 1132   BP: 145/84   Pulse: 89   Resp: 16   Temp: 97.4 °F (36.3 °C)   TempSrc: " "Infrared   SpO2: 98%   Weight: 87.5 kg (192 lb 12.8 oz)   Height: 175.3 cm (69.02\")   PainSc:   1   PainLoc: Leg     Body mass index is 28.46 kg/m².     Physical Exam  Vitals and nursing note reviewed.   Constitutional:       General: He is not in acute distress.     Appearance: Normal appearance.   HENT:      Head: Normocephalic and atraumatic.      Right Ear: External ear normal.      Left Ear: External ear normal.      Nose: Nose normal.   Neck:      Vascular: No carotid bruit.   Cardiovascular:      Rate and Rhythm: Normal rate and regular rhythm.      Heart sounds: Normal heart sounds.   Pulmonary:      Effort: Pulmonary effort is normal.      Breath sounds: Normal breath sounds.   Musculoskeletal:         General: Normal range of motion.      Cervical back: Normal range of motion and neck supple. No tenderness.   Skin:     General: Skin is warm and dry.   Neurological:      Mental Status: He is alert and oriented to person, place, and time.   Psychiatric:         Mood and Affect: Mood normal.         Behavior: Behavior normal.         Thought Content: Thought content normal.         Judgment: Judgment normal.         Assessment / Plan      Assessment/Plan  Problems Addressed This Visit  Diagnoses and all orders for this visit:    1. Essential hypertension (Primary)  Assessment & Plan:  Hypertension is unchanged.  Dietary sodium restriction.  Medication changes per orders.  Ambulatory blood pressure monitoring.  Blood pressure will be reassessed at the next regular appointment. 2 month f/u PCP  HCTZ discontinued and start Losartan    Orders:  -     losartan (Cozaar) 25 MG tablet; Take 1 tablet by mouth Daily.  Dispense: 90 tablet; Refill: 0    2. Leg heaviness  Assessment & Plan:  Labs today  Dietary sodium restriction.  Medication changes per orders.  Ambulatory blood pressure monitoring.    Orders:  -     CBC & Differential  -     Comprehensive Metabolic Panel      Plan of care reviewed with patient at the " conclusion of today's visit. Education was provided regarding diagnosis and management.  Patient verbalizes understanding of and agreement with management plan.    Follow Up: Return in about 2 months (around 11/8/2021), or if symptoms worsen or fail to improve, for Next scheduled follow up HTN with PCP.        MK Jones  [facility] [place]            Answers for HPI/ROS submitted by the patient on 9/6/2021  What is the primary reason for your visit?: Other  Please describe your symptoms.: This appointment is a follow up because of a change in my blood pressure medication.  However, since the change in my medication I am experiencing weakness and soreness in my thigh muscles and shortness of breath when walking medium distances.  Since these symptoms began at about the same time as the change in my medication, I wonder if the symptoms are a side effect of the blood pressure medication.  I also might have some arthritis in my hips and knees that I would like to disucss.  Have you had these symptoms before?: No  How long have you been having these symptoms?: Greater than 2 weeks  Please list any medications you are currently taking for this condition.: hydrochlorothiazide 25mg once per day., Diclofenac -- only as needed for hip pain  (I don't take this very often)  Please describe any probable cause for these symptoms. : ?

## 2021-09-09 LAB
ALBUMIN SERPL-MCNC: 4.4 G/DL (ref 3.5–5.2)
ALBUMIN/GLOB SERPL: 1.5 G/DL
ALP SERPL-CCNC: 75 U/L (ref 39–117)
ALT SERPL W P-5'-P-CCNC: 21 U/L (ref 1–41)
ANION GAP SERPL CALCULATED.3IONS-SCNC: 9.6 MMOL/L (ref 5–15)
AST SERPL-CCNC: 16 U/L (ref 1–40)
BILIRUB SERPL-MCNC: 0.6 MG/DL (ref 0–1.2)
BUN SERPL-MCNC: 14 MG/DL (ref 8–23)
BUN/CREAT SERPL: 13.2 (ref 7–25)
CALCIUM SPEC-SCNC: 9.9 MG/DL (ref 8.6–10.5)
CHLORIDE SERPL-SCNC: 93 MMOL/L (ref 98–107)
CO2 SERPL-SCNC: 29.4 MMOL/L (ref 22–29)
CREAT SERPL-MCNC: 1.06 MG/DL (ref 0.76–1.27)
GFR SERPL CREATININE-BSD FRML MDRD: 70 ML/MIN/1.73
GLOBULIN UR ELPH-MCNC: 2.9 GM/DL
GLUCOSE SERPL-MCNC: 82 MG/DL (ref 65–99)
POTASSIUM SERPL-SCNC: 3.7 MMOL/L (ref 3.5–5.2)
PROT SERPL-MCNC: 7.3 G/DL (ref 6–8.5)
SODIUM SERPL-SCNC: 132 MMOL/L (ref 136–145)

## 2021-09-10 DIAGNOSIS — D72.828 OTHER ELEVATED WHITE BLOOD CELL (WBC) COUNT: Primary | ICD-10-CM

## 2021-09-11 NOTE — PROGRESS NOTES
Labs reviewed and noted that WBC slight elevation from previous visit at 13.8 and sodium level 132 which is slightly low. Otherwise, labs are stable with no significant findings as compared to previous results 6 months ago. I would like to do urinalysis to rule out urinary tract infection due to slight WBC elevation and I feel sodium level will improve now that we stopped the diuretic at last visit and started Losartan instead. We will continue to monitor and I will put lab order for urine specimen in so you may stop by office to leave sample. Follow up with Dr. Vazquez as planned upcoming. Contact me with any questions or concerns.

## 2021-09-13 ENCOUNTER — LAB (OUTPATIENT)
Dept: FAMILY MEDICINE CLINIC | Facility: CLINIC | Age: 66
End: 2021-09-13

## 2021-09-13 DIAGNOSIS — D72.828 OTHER ELEVATED WHITE BLOOD CELL (WBC) COUNT: ICD-10-CM

## 2021-09-13 PROBLEM — R29.898 LEG HEAVINESS: Status: ACTIVE | Noted: 2021-09-13

## 2021-09-13 LAB
BILIRUB BLD-MCNC: NEGATIVE MG/DL
CLARITY, POC: ABNORMAL
COLOR UR: YELLOW
GLUCOSE UR STRIP-MCNC: NEGATIVE MG/DL
KETONES UR QL: NEGATIVE
LEUKOCYTE EST, POC: NEGATIVE
NITRITE UR-MCNC: NEGATIVE MG/ML
PH UR: 6 [PH] (ref 5–8)
PROT UR STRIP-MCNC: NEGATIVE MG/DL
RBC # UR STRIP: NEGATIVE /UL
SP GR UR: 1.01 (ref 1–1.03)
UROBILINOGEN UR QL: NORMAL

## 2021-09-13 PROCEDURE — 81003 URINALYSIS AUTO W/O SCOPE: CPT | Performed by: NURSE PRACTITIONER

## 2021-09-13 NOTE — ASSESSMENT & PLAN NOTE
Labs today  Dietary sodium restriction.  Medication changes per orders.  Ambulatory blood pressure monitoring.

## 2021-09-13 NOTE — ASSESSMENT & PLAN NOTE
Hypertension is unchanged.  Dietary sodium restriction.  Medication changes per orders.  Ambulatory blood pressure monitoring.  Blood pressure will be reassessed at the next regular appointment. 2 month f/u PCP  HCTZ discontinued and start Losartan

## 2021-11-09 ENCOUNTER — OFFICE VISIT (OUTPATIENT)
Dept: FAMILY MEDICINE CLINIC | Facility: CLINIC | Age: 66
End: 2021-11-09

## 2021-11-09 VITALS
DIASTOLIC BLOOD PRESSURE: 86 MMHG | SYSTOLIC BLOOD PRESSURE: 136 MMHG | OXYGEN SATURATION: 97 % | WEIGHT: 192.6 LBS | HEART RATE: 79 BPM | HEIGHT: 69 IN | RESPIRATION RATE: 16 BRPM | TEMPERATURE: 97.6 F | BODY MASS INDEX: 28.53 KG/M2

## 2021-11-09 DIAGNOSIS — M25.562 CHRONIC PAIN OF BOTH KNEES: ICD-10-CM

## 2021-11-09 DIAGNOSIS — M25.561 CHRONIC PAIN OF BOTH KNEES: ICD-10-CM

## 2021-11-09 DIAGNOSIS — M62.81 QUADRICEPS WEAKNESS: ICD-10-CM

## 2021-11-09 DIAGNOSIS — G89.29 CHRONIC PAIN OF BOTH KNEES: ICD-10-CM

## 2021-11-09 DIAGNOSIS — I10 ESSENTIAL HYPERTENSION: Primary | ICD-10-CM

## 2021-11-09 PROBLEM — M25.551 ACUTE RIGHT HIP PAIN: Chronic | Status: ACTIVE | Noted: 2021-03-01

## 2021-11-09 LAB
BASOPHILS # BLD AUTO: 0.06 10*3/MM3 (ref 0–0.2)
BASOPHILS NFR BLD AUTO: 0.6 % (ref 0–1.5)
DEPRECATED RDW RBC AUTO: 43.8 FL (ref 37–54)
EOSINOPHIL # BLD AUTO: 0.23 10*3/MM3 (ref 0–0.4)
EOSINOPHIL NFR BLD AUTO: 2.4 % (ref 0.3–6.2)
ERYTHROCYTE [DISTWIDTH] IN BLOOD BY AUTOMATED COUNT: 12.3 % (ref 12.3–15.4)
ERYTHROCYTE [SEDIMENTATION RATE] IN BLOOD: 17 MM/HR (ref 0–20)
HCT VFR BLD AUTO: 48.3 % (ref 37.5–51)
HGB BLD-MCNC: 16.7 G/DL (ref 13–17.7)
IMM GRANULOCYTES # BLD AUTO: 0.02 10*3/MM3 (ref 0–0.05)
IMM GRANULOCYTES NFR BLD AUTO: 0.2 % (ref 0–0.5)
LYMPHOCYTES # BLD AUTO: 2.12 10*3/MM3 (ref 0.7–3.1)
LYMPHOCYTES NFR BLD AUTO: 21.7 % (ref 19.6–45.3)
MCH RBC QN AUTO: 33.5 PG (ref 26.6–33)
MCHC RBC AUTO-ENTMCNC: 34.6 G/DL (ref 31.5–35.7)
MCV RBC AUTO: 96.8 FL (ref 79–97)
MONOCYTES # BLD AUTO: 0.95 10*3/MM3 (ref 0.1–0.9)
MONOCYTES NFR BLD AUTO: 9.7 % (ref 5–12)
NEUTROPHILS NFR BLD AUTO: 6.38 10*3/MM3 (ref 1.7–7)
NEUTROPHILS NFR BLD AUTO: 65.4 % (ref 42.7–76)
NRBC BLD AUTO-RTO: 0 /100 WBC (ref 0–0.2)
PLATELET # BLD AUTO: 249 10*3/MM3 (ref 140–450)
PMV BLD AUTO: 9.9 FL (ref 6–12)
RBC # BLD AUTO: 4.99 10*6/MM3 (ref 4.14–5.8)
WBC # BLD AUTO: 9.76 10*3/MM3 (ref 3.4–10.8)

## 2021-11-09 PROCEDURE — 86431 RHEUMATOID FACTOR QUANT: CPT | Performed by: FAMILY MEDICINE

## 2021-11-09 PROCEDURE — 85025 COMPLETE CBC W/AUTO DIFF WBC: CPT | Performed by: FAMILY MEDICINE

## 2021-11-09 PROCEDURE — 86060 ANTISTREPTOLYSIN O TITER: CPT | Performed by: FAMILY MEDICINE

## 2021-11-09 PROCEDURE — 99214 OFFICE O/P EST MOD 30 MIN: CPT | Performed by: FAMILY MEDICINE

## 2021-11-09 PROCEDURE — 86038 ANTINUCLEAR ANTIBODIES: CPT | Performed by: FAMILY MEDICINE

## 2021-11-09 PROCEDURE — 80053 COMPREHEN METABOLIC PANEL: CPT | Performed by: FAMILY MEDICINE

## 2021-11-09 PROCEDURE — 85652 RBC SED RATE AUTOMATED: CPT | Performed by: FAMILY MEDICINE

## 2021-11-09 PROCEDURE — 86063 ANTISTREPTOLYSIN O SCREEN: CPT | Performed by: FAMILY MEDICINE

## 2021-11-09 PROCEDURE — 86140 C-REACTIVE PROTEIN: CPT | Performed by: FAMILY MEDICINE

## 2021-11-09 RX ORDER — DICLOFENAC SODIUM 75 MG/1
75 TABLET, DELAYED RELEASE ORAL 2 TIMES DAILY
Qty: 180 TABLET | Refills: 1 | Status: CANCELLED | OUTPATIENT
Start: 2021-11-09

## 2021-11-09 RX ORDER — SENNOSIDES 8.6 MG
650 CAPSULE ORAL EVERY 8 HOURS PRN
COMMUNITY
End: 2022-09-12 | Stop reason: HOSPADM

## 2021-11-09 RX ORDER — CELECOXIB 200 MG/1
200 CAPSULE ORAL 2 TIMES DAILY
Qty: 180 CAPSULE | Refills: 3 | Status: SHIPPED | OUTPATIENT
Start: 2021-11-09 | End: 2022-05-24 | Stop reason: CLARIF

## 2021-11-09 RX ORDER — LOSARTAN POTASSIUM 25 MG/1
25 TABLET ORAL DAILY
Qty: 90 TABLET | Refills: 3 | Status: SHIPPED | OUTPATIENT
Start: 2021-11-09 | End: 2022-07-27 | Stop reason: CLARIF

## 2021-11-09 NOTE — PROGRESS NOTES
"Kt Davis is a 66 y.o. male who presents today for Hypertension (2 month F/U (D/C HCTZ, added Losartan 25MG/Day) ) and Leg Pain (Pain is consistent, heaviness has gone away after change in medication )      Leg pain: He reports ongoing bilateral leg pain for 8 months. He states that since changing medications, the diclofenac seems to take the severity out of the pain but does not alleviate it completely. He states that the feeling of heaviness has since disappeared since switching from valsartan to losartan approximately 2 months ago. He states that the pain in his legs limits his range of motion and forces him to take smaller steps, affecting his ability to walk. He states that changes in weather and prolonged walking aggravate his symptoms. He also endorses bilateral stiffness and \"crackles\" in his knees which make it difficult to bend over or squat. He endorses muscle weakness in his quadriceps, but denies numbness or paraesthesias. He denies a family history of autoimmune disease, injury to the legs.    HTN: States he checks his blood pressure once per week and reports that it typically runs around 130/80. Compliant with taking medications. He denies headaches, blurry vision, heart palpitations, chest pain, dyspnea.       Review of Systems   Constitutional: Negative for fever and unexpected weight loss.   HENT: Negative for congestion, ear pain and sore throat.    Eyes: Negative for visual disturbance.   Respiratory: Negative for cough, shortness of breath and wheezing.    Cardiovascular: Negative for chest pain and palpitations.   Gastrointestinal: Negative for abdominal pain, blood in stool, constipation, diarrhea, nausea, vomiting and GERD.   Endocrine: Negative for polydipsia and polyuria.   Genitourinary: Negative for difficulty urinating.   Musculoskeletal: Positive for arthralgias and gait problem. Negative for joint swelling, myalgias, neck pain and bursitis.   Skin: Negative for rash and skin " "lesions.   Allergic/Immunologic: Negative for environmental allergies.   Neurological: Positive for weakness. Negative for seizures, syncope and numbness.   Hematological: Does not bruise/bleed easily.   Psychiatric/Behavioral: Negative for suicidal ideas.        The following portions of the patient's history were reviewed and updated as appropriate: allergies, current medications, past family history, past medical history, past social history, past surgical history and problem list.    Current Outpatient Medications on File Prior to Visit   Medication Sig Dispense Refill   • acetaminophen (Tylenol 8 Hour Arthritis Pain) 650 MG 8 hr tablet Take 650 mg by mouth Every 8 (Eight) Hours As Needed for Mild Pain .     • Dextromethorphan-guaiFENesin (MUCUS RELIEF DM PO) Take 1,200 mg by mouth As Needed.     • Multiple Vitamins-Minerals (MULTIVITAL PO) Take 1 dose by mouth Every Morning.     • [DISCONTINUED] diclofenac (VOLTAREN) 75 MG EC tablet Take 1 tablet by mouth 2 (Two) Times a Day. (Patient taking differently: Take 75 mg by mouth As Needed.) 180 tablet 1   • [DISCONTINUED] losartan (Cozaar) 25 MG tablet Take 1 tablet by mouth Daily. 90 tablet 0   • [DISCONTINUED] Cholecalciferol (VITAMIN D3) 50 MCG (2000 UT) capsule Take 1,000 Units by mouth Daily.       No current facility-administered medications on file prior to visit.       Allergies   Allergen Reactions   • Lisinopril Cough   • Valsartan Cough        Visit Vitals  /86   Pulse 79   Temp 97.6 °F (36.4 °C) (Infrared)   Resp 16   Ht 175.3 cm (69\")   Wt 87.4 kg (192 lb 9.6 oz)   SpO2 97%   BMI 28.44 kg/m²        Physical Exam  Vitals and nursing note reviewed.   Constitutional:       General: He is not in acute distress.     Appearance: Normal appearance. He is well-developed. He is not diaphoretic.   HENT:      Head: Atraumatic.   Cardiovascular:      Rate and Rhythm: Normal rate and regular rhythm.      Heart sounds: Normal heart sounds. No murmur heard.  No " friction rub. No gallop.    Pulmonary:      Effort: Pulmonary effort is normal. No respiratory distress.      Breath sounds: Normal breath sounds. No stridor. No wheezing, rhonchi or rales.   Abdominal:      General: Bowel sounds are normal. There is no distension.      Palpations: Abdomen is soft. There is no mass.      Tenderness: There is no abdominal tenderness. There is no guarding or rebound.      Hernia: No hernia is present.   Musculoskeletal:         General: No swelling, tenderness, deformity or signs of injury.      Cervical back: Normal range of motion and neck supple.      Right knee: Crepitus present. No swelling. No tenderness.      Left knee: Crepitus present. No swelling. No tenderness.      Right lower leg: No swelling. No edema.      Left lower leg: No swelling. No edema.   Skin:     General: Skin is warm and dry.   Neurological:      Mental Status: He is alert and oriented to person, place, and time.      Motor: Weakness present.      Deep Tendon Reflexes: Reflexes are normal and symmetric.   Psychiatric:         Behavior: Behavior normal.          Results for orders placed or performed in visit on 09/13/21   POCT urinalysis dipstick, automated    Specimen: Urine   Result Value Ref Range    Color Yellow Yellow, Straw, Dark Yellow, Dinorah    Clarity, UA Cloudy (A) Clear    Specific Gravity  1.015 1.005 - 1.030    pH, Urine 6.0 5.0 - 8.0    Leukocytes Negative Negative    Nitrite, UA Negative Negative    Protein, POC Negative Negative mg/dL    Glucose, UA Negative Negative, 1000 mg/dL (3+) mg/dL    Ketones, UA Negative Negative    Urobilinogen, UA Normal Normal    Bilirubin Negative Negative    Blood, UA Negative Negative        Problems Addressed this Visit        Cardiac and Vasculature    Essential hypertension - Primary     Hypertension is improving with treatment.  Continue current treatment regimen.  Blood pressure will be reassessed at the next regular appointment.         Relevant  Medications    losartan (Cozaar) 25 MG tablet       Musculoskeletal and Injuries    Chronic pain of both knees     Chronic and stable.  Patient does not feel like he has been able to get back to his baseline as far as weakness in the hamstrings and quadriceps.  Patient was encouraged to continue to physical therapy she has at home.  Order Celebrex to see if he can get better control than he was having with diclofenac.  Will also order labs evaluating for rheumatological disorders.  RTC/ED precautions given.  Patient will follow up in March to reassess effectiveness of treatment during his Medicare wellness visit.         Relevant Medications    celecoxib (CeleBREX) 200 MG capsule    Other Relevant Orders    CBC & Differential    Comprehensive Metabolic Panel    Sedimentation rate, automated    Antistreptolysin O screen    Rheumatoid factor    C-reactive protein    VIDHYA    Quadriceps weakness    Relevant Orders    CBC & Differential    Comprehensive Metabolic Panel    Sedimentation rate, automated    Antistreptolysin O screen    Rheumatoid factor    C-reactive protein    VIDHYA      Diagnoses       Codes Comments    Essential hypertension    -  Primary ICD-10-CM: I10  ICD-9-CM: 401.9     Chronic pain of both knees     ICD-10-CM: M25.561, M25.562, G89.29  ICD-9-CM: 719.46, 338.29     Quadriceps weakness     ICD-10-CM: M62.81  ICD-9-CM: 728.87           Return in about 17 weeks (around 3/8/2022) for Medicare Wellness.    Tani Vazquez MD   11/9/2021

## 2021-11-09 NOTE — ASSESSMENT & PLAN NOTE
Chronic and stable.  Patient does not feel like he has been able to get back to his baseline as far as weakness in the hamstrings and quadriceps.  Patient was encouraged to continue to physical therapy she has at home.  Order Celebrex to see if he can get better control than he was having with diclofenac.  Will also order labs evaluating for rheumatological disorders.  RTC/ED precautions given.  Patient will follow up in March to reassess effectiveness of treatment during his Medicare wellness visit.

## 2021-11-10 LAB
ALBUMIN SERPL-MCNC: 4.8 G/DL (ref 3.5–5.2)
ALBUMIN/GLOB SERPL: 1.7 G/DL
ALP SERPL-CCNC: 78 U/L (ref 39–117)
ALT SERPL W P-5'-P-CCNC: 33 U/L (ref 1–41)
ANION GAP SERPL CALCULATED.3IONS-SCNC: 10.2 MMOL/L (ref 5–15)
ASO AB SERPL-ACNC: POSITIVE [IU]/ML
ASO AB TITR SER: ABNORMAL {TITER}
AST SERPL-CCNC: 24 U/L (ref 1–40)
BILIRUB SERPL-MCNC: 0.4 MG/DL (ref 0–1.2)
BUN SERPL-MCNC: 12 MG/DL (ref 8–23)
BUN/CREAT SERPL: 12 (ref 7–25)
CALCIUM SPEC-SCNC: 10.2 MG/DL (ref 8.6–10.5)
CHLORIDE SERPL-SCNC: 104 MMOL/L (ref 98–107)
CHROMATIN AB SERPL-ACNC: <10 IU/ML (ref 0–14)
CO2 SERPL-SCNC: 26.8 MMOL/L (ref 22–29)
CREAT SERPL-MCNC: 1 MG/DL (ref 0.76–1.27)
CRP SERPL-MCNC: 0.41 MG/DL (ref 0–0.5)
GFR SERPL CREATININE-BSD FRML MDRD: 75 ML/MIN/1.73
GLOBULIN UR ELPH-MCNC: 2.8 GM/DL
GLUCOSE SERPL-MCNC: 86 MG/DL (ref 65–99)
POTASSIUM SERPL-SCNC: 4.7 MMOL/L (ref 3.5–5.2)
PROT SERPL-MCNC: 7.6 G/DL (ref 6–8.5)
SODIUM SERPL-SCNC: 141 MMOL/L (ref 136–145)

## 2021-11-11 LAB — ANA SER QL: NEGATIVE

## 2022-01-18 ENCOUNTER — DOCUMENTATION (OUTPATIENT)
Dept: PHYSICAL THERAPY | Facility: CLINIC | Age: 67
End: 2022-01-18

## 2022-01-18 DIAGNOSIS — M25.561 CHRONIC PAIN OF BOTH KNEES: Primary | ICD-10-CM

## 2022-01-18 DIAGNOSIS — G89.29 CHRONIC PAIN OF BOTH KNEES: Primary | ICD-10-CM

## 2022-01-18 DIAGNOSIS — M25.562 CHRONIC PAIN OF BOTH KNEES: Primary | ICD-10-CM

## 2022-01-18 NOTE — PROGRESS NOTES
Discharge Summary  Discharge Summary from Physical Therapy Report    Patient: Kt Davis   : 1955  Diagnosis/ICD-10 Code:  The encounter diagnosis was Chronic pain of both knees.   Referring practitioner: Tani Vazquez MD  Date of Initial Visit: Type: THERAPY  Noted: 2021  Today's Date: 2022      Number of Visits: 10     Date of Discharge: 2021    Goals: Partially Met    Assessment/Reason for Discharge: no call back after trial independent HEP    Discharge Plan: Continue with current home exercise program as instructed            Jacinta Chris, PT  Physical Therapist

## 2022-03-09 ENCOUNTER — OFFICE VISIT (OUTPATIENT)
Dept: FAMILY MEDICINE CLINIC | Facility: CLINIC | Age: 67
End: 2022-03-09

## 2022-03-09 ENCOUNTER — LAB (OUTPATIENT)
Dept: LAB | Facility: HOSPITAL | Age: 67
End: 2022-03-09

## 2022-03-09 VITALS
WEIGHT: 193 LBS | HEIGHT: 69 IN | SYSTOLIC BLOOD PRESSURE: 130 MMHG | DIASTOLIC BLOOD PRESSURE: 80 MMHG | OXYGEN SATURATION: 98 % | TEMPERATURE: 98.2 F | HEART RATE: 106 BPM | BODY MASS INDEX: 28.58 KG/M2

## 2022-03-09 DIAGNOSIS — Z00.00 MEDICARE ANNUAL WELLNESS VISIT, SUBSEQUENT: ICD-10-CM

## 2022-03-09 DIAGNOSIS — I10 ESSENTIAL HYPERTENSION: Primary | ICD-10-CM

## 2022-03-09 DIAGNOSIS — D22.9 NEVUS: ICD-10-CM

## 2022-03-09 DIAGNOSIS — Z12.5 ENCOUNTER FOR SCREENING FOR MALIGNANT NEOPLASM OF PROSTATE: ICD-10-CM

## 2022-03-09 LAB
BASOPHILS # BLD AUTO: 0.06 10*3/MM3 (ref 0–0.2)
BASOPHILS NFR BLD AUTO: 0.6 % (ref 0–1.5)
DEPRECATED RDW RBC AUTO: 40.1 FL (ref 37–54)
EOSINOPHIL # BLD AUTO: 0.25 10*3/MM3 (ref 0–0.4)
EOSINOPHIL NFR BLD AUTO: 2.5 % (ref 0.3–6.2)
ERYTHROCYTE [DISTWIDTH] IN BLOOD BY AUTOMATED COUNT: 11.7 % (ref 12.3–15.4)
HCT VFR BLD AUTO: 44.3 % (ref 37.5–51)
HGB BLD-MCNC: 15.4 G/DL (ref 13–17.7)
IMM GRANULOCYTES # BLD AUTO: 0.03 10*3/MM3 (ref 0–0.05)
IMM GRANULOCYTES NFR BLD AUTO: 0.3 % (ref 0–0.5)
LYMPHOCYTES # BLD AUTO: 1.7 10*3/MM3 (ref 0.7–3.1)
LYMPHOCYTES NFR BLD AUTO: 16.8 % (ref 19.6–45.3)
MCH RBC QN AUTO: 33.3 PG (ref 26.6–33)
MCHC RBC AUTO-ENTMCNC: 34.8 G/DL (ref 31.5–35.7)
MCV RBC AUTO: 95.7 FL (ref 79–97)
MONOCYTES # BLD AUTO: 0.92 10*3/MM3 (ref 0.1–0.9)
MONOCYTES NFR BLD AUTO: 9.1 % (ref 5–12)
NEUTROPHILS NFR BLD AUTO: 7.17 10*3/MM3 (ref 1.7–7)
NEUTROPHILS NFR BLD AUTO: 70.7 % (ref 42.7–76)
NRBC BLD AUTO-RTO: 0 /100 WBC (ref 0–0.2)
PLATELET # BLD AUTO: 226 10*3/MM3 (ref 140–450)
PMV BLD AUTO: 9.9 FL (ref 6–12)
RBC # BLD AUTO: 4.63 10*6/MM3 (ref 4.14–5.8)
WBC NRBC COR # BLD: 10.13 10*3/MM3 (ref 3.4–10.8)

## 2022-03-09 PROCEDURE — 1159F MED LIST DOCD IN RCRD: CPT | Performed by: FAMILY MEDICINE

## 2022-03-09 PROCEDURE — 99214 OFFICE O/P EST MOD 30 MIN: CPT | Performed by: FAMILY MEDICINE

## 2022-03-09 PROCEDURE — 85025 COMPLETE CBC W/AUTO DIFF WBC: CPT | Performed by: FAMILY MEDICINE

## 2022-03-09 PROCEDURE — G0103 PSA SCREENING: HCPCS | Performed by: FAMILY MEDICINE

## 2022-03-09 PROCEDURE — 80061 LIPID PANEL: CPT | Performed by: FAMILY MEDICINE

## 2022-03-09 PROCEDURE — 1125F AMNT PAIN NOTED PAIN PRSNT: CPT | Performed by: FAMILY MEDICINE

## 2022-03-09 PROCEDURE — 80053 COMPREHEN METABOLIC PANEL: CPT | Performed by: FAMILY MEDICINE

## 2022-03-09 PROCEDURE — G0439 PPPS, SUBSEQ VISIT: HCPCS | Performed by: FAMILY MEDICINE

## 2022-03-09 PROCEDURE — 1170F FXNL STATUS ASSESSED: CPT | Performed by: FAMILY MEDICINE

## 2022-03-09 RX ORDER — AMLODIPINE BESYLATE 2.5 MG/1
2.5 TABLET ORAL DAILY
Qty: 90 TABLET | Refills: 1 | Status: SHIPPED | OUTPATIENT
Start: 2022-03-09 | End: 2022-04-25 | Stop reason: SDUPTHER

## 2022-03-09 NOTE — ASSESSMENT & PLAN NOTE
2 lesions on the face 1 the left is concerning for basal cell carcinoma versus atypical nevus.  The 1 on the right more consistent with a support keratosis.  Patient was advised to schedule appointment with dermatologist for annual skin cancer screening and to have the lesions evaluated further.

## 2022-03-09 NOTE — ASSESSMENT & PLAN NOTE
Hypertension is Blood pressure well controlled but mild symptoms may be secondary to losartan.  Will discontinue this medication and start amlodipine 2.5 mg daily.  Ambulatory blood pressure monitoring.  Blood pressure will be reassessed in 3 months.

## 2022-03-09 NOTE — PROGRESS NOTES
The ABCs of the Annual Wellness Visit  Subsequent Medicare Wellness Visit    Chief Complaint   Patient presents with   • Medicare Wellness-subsequent      Subjective    History of Present Illness:  Kt Davis is a 67 y.o. male who presents for a Subsequent Medicare Wellness Visit.    The following portions of the patient's history were reviewed and   updated as appropriate: allergies, current medications, past family history, past medical history, past social history, past surgical history and problem list.    Compared to one year ago, the patient feels his physical   health is worse. Patient has a spot on his face and one on his arm which has appeared in the last year (face) and last 3-4 months on arm. It has not been changing in size, shape or color and does not bother him. He had inguinal hernia repair 2 years ago. He had a blood blister appear in the area abut 6 weeks ago which broke and oozed. He put antibiotic ointment on it. He is no longer having drainage but it is still discolored. No pain or irritation. He continues to have stiffness and weakness in thigh muscles. He states the pain in the joints is rare since taking celebrex. He feels like the thigh weakness has started and worsened since starting losartan. Valsartan and lisinopril caused cough. HCTZ caused similar muscle weakness.     Compared to one year ago, the patient feels his mental   health is the same.    Recent Hospitalizations:  He was not admitted to the hospital during the last year.       Current Medical Providers:  Patient Care Team:  Tani Vazquez MD as PCP - General (Family Medicine)    Outpatient Medications Prior to Visit   Medication Sig Dispense Refill   • acetaminophen (TYLENOL) 650 MG 8 hr tablet Take 650 mg by mouth Every 8 (Eight) Hours As Needed for Mild Pain .     • celecoxib (CeleBREX) 200 MG capsule Take 1 capsule by mouth 2 (Two) Times a Day. 180 capsule 3   • Dextromethorphan-guaiFENesin (MUCUS RELIEF DM PO) Take  1,200 mg by mouth As Needed.     • losartan (Cozaar) 25 MG tablet Take 1 tablet by mouth Daily. 90 tablet 3   • Multiple Vitamins-Minerals (MULTIVITAL PO) Take 1 dose by mouth Every Morning.       No facility-administered medications prior to visit.       No opioid medication identified on active medication list. I have reviewed chart for other potential  high risk medication/s and harmful drug interactions in the elderly.          Aspirin is not on active medication list.  Aspirin use is not indicated based on review of current medical condition/s. Risk of harm outweighs potential benefits.  .    Patient Active Problem List   Diagnosis   • SDH (subdural hematoma) (HCC)   • SAH (subarachnoid hemorrhage) (HCC)   • Fall due to ice or snow   • Blunt head trauma due to fall   • Traumatic subarachnoid hemorrhage with loss of consciousness of 30 minutes or less (HCC)   • Essential hypertension   • Inguinal hernia of left side without obstruction or gangrene   • Medicare annual wellness visit, subsequent   • Inguinal hernia   • Encounter for screening for malignant neoplasm of prostate    • Chronic right hip pain   • Chronic pain of both knees   • Avascular necrosis of hip, right (HCC)   • Quadriceps weakness   • Leg heaviness   • Nevus     Advance Care Planning  Advance Directive is on file.  ACP discussion was held with the patient during this visit. Patient has an advance directive in EMR which is still valid.     Review of Systems   Constitutional: Negative for activity change, appetite change, chills, diaphoresis, fatigue and fever.   HENT: Negative for congestion, ear pain, postnasal drip, sinus pressure and sore throat.    Eyes: Negative for visual disturbance.   Respiratory: Negative for cough, chest tightness and shortness of breath.    Cardiovascular: Negative for chest pain, palpitations and leg swelling.   Gastrointestinal: Negative for abdominal pain, blood in stool, constipation, diarrhea, nausea and  "vomiting.   Genitourinary: Negative for difficulty urinating, frequency and hematuria.   Musculoskeletal: Positive for myalgias (leg weakness). Negative for arthralgias.   Skin: Negative for rash.   Neurological: Negative for dizziness, seizures, weakness and confusion.   Hematological: Does not bruise/bleed easily.   Psychiatric/Behavioral: Negative for dysphoric mood, sleep disturbance and suicidal ideas. The patient is not nervous/anxious.         Objective    Vitals:    03/09/22 1312   BP: 130/80   Pulse: 106   Temp: 98.2 °F (36.8 °C)   SpO2: 98%   Weight: 87.5 kg (193 lb)   Height: 175.3 cm (69\")   PainSc:   4   PainLoc: Leg     BMI Readings from Last 1 Encounters:   03/09/22 28.50 kg/m²   BMI is above normal parameters. Recommendations include: exercise counseling and nutrition counseling    Does the patient have evidence of cognitive impairment? No    Physical Exam  Constitutional:       General: He is not in acute distress.     Appearance: He is well-developed. He is not diaphoretic.   HENT:      Head: Atraumatic.   Cardiovascular:      Rate and Rhythm: Normal rate and regular rhythm.      Heart sounds: Normal heart sounds. No murmur heard.    No friction rub. No gallop.   Pulmonary:      Effort: Pulmonary effort is normal. No respiratory distress.      Breath sounds: Normal breath sounds. No stridor. No wheezing, rhonchi or rales.   Abdominal:      General: Bowel sounds are normal. There is no distension.      Palpations: Abdomen is soft. There is no mass.      Tenderness: There is no abdominal tenderness. There is no guarding or rebound.      Hernia: No hernia is present.   Musculoskeletal:      Cervical back: Normal range of motion and neck supple.   Skin:     General: Skin is warm and dry.          Neurological:      Mental Status: He is alert and oriented to person, place, and time.   Psychiatric:         Behavior: Behavior normal.                 HEALTH RISK ASSESSMENT    Smoking Status:  Social " History     Tobacco Use   Smoking Status Never Smoker   Smokeless Tobacco Never Used     Alcohol Consumption:  Social History     Substance and Sexual Activity   Alcohol Use No     Fall Risk Screen:    STEADI Fall Risk Assessment was completed, and patient is at LOW risk for falls.Assessment completed on:3/9/2022    Depression Screening:  No flowsheet data found.  PHQ-9 was completed and was a 4.  This will be scanned into the chart.    Health Habits and Functional and Cognitive Screening:  Functional & Cognitive Status 3/9/2022   Do you have difficulty preparing food and eating? No   Do you have difficulty bathing yourself, getting dressed or grooming yourself? No   Do you have difficulty using the toilet? No   Do you have difficulty moving around from place to place? Yes   Do you have trouble with steps or getting out of a bed or a chair? Yes   Current Diet Well Balanced Diet   Dental Exam Up to date   Eye Exam Up to date   Exercise (times per week) 4 times per week   Current Exercises Include Walking   Current Exercise Activities Include -   Do you need help using the phone?  No   Are you deaf or do you have serious difficulty hearing?  No   Do you need help with transportation? No   Do you need help shopping? No   Do you need help preparing meals?  No   Do you need help with housework?  No   Do you need help with laundry? No   Do you need help taking your medications? No   Do you need help managing money? No   Do you ever drive or ride in a car without wearing a seat belt? No   Have you felt unusual stress, anger or loneliness in the last month? No   Who do you live with? Spouse   If you need help, do you have trouble finding someone available to you? No   Have you been bothered in the last four weeks by sexual problems? No   Do you have difficulty concentrating, remembering or making decisions? No       Age-appropriate Screening Schedule:  Refer to the list below for future screening recommendations based on  patient's age, sex and/or medical conditions. Orders for these recommended tests are listed in the plan section. The patient has been provided with a written plan.    Health Maintenance   Topic Date Due   • TDAP/TD VACCINES (2 - Td or Tdap) 01/01/2023   • INFLUENZA VACCINE  Completed   • ZOSTER VACCINE  Completed              Assessment/Plan   CMS Preventative Services Quick Reference  Risk Factors Identified During Encounter  Obesity/Overweight   The above risks/problems have been discussed with the patient.  Follow up actions/plans if indicated are seen below in the Assessment/Plan Section.  Pertinent information has been shared with the patient in the After Visit Summary.    Diagnoses and all orders for this visit:    1. Essential hypertension (Primary)  Assessment & Plan:  Hypertension is Blood pressure well controlled but mild symptoms may be secondary to losartan.  Will discontinue this medication and start amlodipine 2.5 mg daily.  Ambulatory blood pressure monitoring.  Blood pressure will be reassessed in 3 months.    Orders:  -     amLODIPine (NORVASC) 2.5 MG tablet; Take 1 tablet by mouth Daily.  Dispense: 90 tablet; Refill: 1  -     CBC & Differential  -     Comprehensive Metabolic Panel  -     Lipid Panel    2. Encounter for screening for malignant neoplasm of prostate   -     PSA Screen    3. Medicare annual wellness visit, subsequent  Assessment & Plan:  The patient is here for health maintenance visit.  Currently, the patient consumes a healthy diet and has an adequate exercise regimen.  Screening lab work is ordered.  Immunizations were reviewed today.  Advice and education was given regarding nutrition, aerobic exercise, routine dental evaluations, routine eye exams, reproductive health, cardiovascular risk reduction, sunscreen use, self skin examination (annual dermatology evaluations) and seatbelt use (general overall safety).  Further recommendations will be given if needed after lab evaluation.   Annual wellness evaluation is recommended.      Orders:  -     CBC & Differential  -     Comprehensive Metabolic Panel  -     Lipid Panel    4. Nevus  Assessment & Plan:  2 lesions on the face 1 the left is concerning for basal cell carcinoma versus atypical nevus.  The 1 on the right more consistent with a support keratosis.  Patient was advised to schedule appointment with dermatologist for annual skin cancer screening and to have the lesions evaluated further.        Follow Up:   Return in about 3 months (around 6/9/2022) for Follow-up HTN, leg weakness.     An After Visit Summary and PPPS were made available to the patient.

## 2022-03-09 NOTE — PATIENT INSTRUCTIONS
Medicare Wellness  Personal Prevention Plan of Service     Date of Office Visit:    Encounter Provider:  Tani Vazquez MD  Place of Service:  [facility]  Patient Name: Kt Davis  :  1955    As part of the Medicare Wellness portion of your visit today, we are providing you with this personalized preventive plan of services (PPPS). This plan is based upon recommendations of the [place] Preventive Services Task Force (USPSTF) and the Advisory Committee on Immunization Practices (ACIP).    This lists the preventive care services that should be considered, and provides dates of when you are due. Items listed as completed are up-to-date and do not require any further intervention.    Health Maintenance   Topic Date Due    ANNUAL WELLNESS VISIT  2022    TDAP/TD VACCINES (2 - Td or Tdap) 2023    COLORECTAL CANCER SCREENING  2025    HEPATITIS C SCREENING  Completed    COVID-19 Vaccine  Completed    INFLUENZA VACCINE  Completed    Pneumococcal Vaccine 65+  Completed    ZOSTER VACCINE  Completed       Orders Placed This Encounter   Procedures    Comprehensive Metabolic Panel     Order Specific Question:   Release to patient     Answer:   Immediate    Lipid Panel    PSA Screen     Order Specific Question:   Release to patient     Answer:   Immediate    CBC & Differential     Order Specific Question:   Manual Differential     Answer:   No       Return in about 3 months (around 2022) for Follow-up HTN, leg weakness.

## 2022-03-10 LAB
ALBUMIN SERPL-MCNC: 4.4 G/DL (ref 3.5–5.2)
ALBUMIN/GLOB SERPL: 1.5 G/DL
ALP SERPL-CCNC: 81 U/L (ref 39–117)
ALT SERPL W P-5'-P-CCNC: 23 U/L (ref 1–41)
ANION GAP SERPL CALCULATED.3IONS-SCNC: 13.6 MMOL/L (ref 5–15)
AST SERPL-CCNC: 21 U/L (ref 1–40)
BILIRUB SERPL-MCNC: 0.4 MG/DL (ref 0–1.2)
BUN SERPL-MCNC: 17 MG/DL (ref 8–23)
BUN/CREAT SERPL: 15.3 (ref 7–25)
CALCIUM SPEC-SCNC: 10.1 MG/DL (ref 8.6–10.5)
CHLORIDE SERPL-SCNC: 102 MMOL/L (ref 98–107)
CHOLEST SERPL-MCNC: 181 MG/DL (ref 0–200)
CO2 SERPL-SCNC: 24.4 MMOL/L (ref 22–29)
CREAT SERPL-MCNC: 1.11 MG/DL (ref 0.76–1.27)
EGFRCR SERPLBLD CKD-EPI 2021: 72.3 ML/MIN/1.73
GLOBULIN UR ELPH-MCNC: 3 GM/DL
GLUCOSE SERPL-MCNC: 123 MG/DL (ref 65–99)
HDLC SERPL-MCNC: 38 MG/DL (ref 40–60)
LDLC SERPL CALC-MCNC: 112 MG/DL (ref 0–100)
LDLC/HDLC SERPL: 2.83 {RATIO}
POTASSIUM SERPL-SCNC: 4.4 MMOL/L (ref 3.5–5.2)
PROT SERPL-MCNC: 7.4 G/DL (ref 6–8.5)
PSA SERPL-MCNC: 0.41 NG/ML (ref 0–4)
SODIUM SERPL-SCNC: 140 MMOL/L (ref 136–145)
TRIGL SERPL-MCNC: 177 MG/DL (ref 0–150)
VLDLC SERPL-MCNC: 31 MG/DL (ref 5–40)

## 2022-04-25 DIAGNOSIS — I10 ESSENTIAL HYPERTENSION: ICD-10-CM

## 2022-04-25 RX ORDER — AMLODIPINE BESYLATE 5 MG/1
5 TABLET ORAL DAILY
Qty: 30 TABLET | Refills: 3 | Status: SHIPPED | OUTPATIENT
Start: 2022-04-25 | End: 2022-07-27 | Stop reason: SDUPTHER

## 2022-05-19 ENCOUNTER — TELEPHONE (OUTPATIENT)
Dept: FAMILY MEDICINE CLINIC | Facility: CLINIC | Age: 67
End: 2022-05-19

## 2022-05-19 NOTE — TELEPHONE ENCOUNTER
SPOKE TO PT TO RESCHEDULE 6/13 APPT. HE IS REQUESTING A REFILL FOR DICLOFENAC 75 MG. PT STATED THAT THE CLEBREX WAS NOT WORKING FOR HIM.

## 2022-05-24 DIAGNOSIS — M25.561 CHRONIC PAIN OF BOTH KNEES: ICD-10-CM

## 2022-05-24 DIAGNOSIS — M25.551 CHRONIC RIGHT HIP PAIN: Primary | ICD-10-CM

## 2022-05-24 DIAGNOSIS — G89.29 CHRONIC RIGHT HIP PAIN: Primary | ICD-10-CM

## 2022-05-24 DIAGNOSIS — M25.562 CHRONIC PAIN OF BOTH KNEES: ICD-10-CM

## 2022-05-24 DIAGNOSIS — G89.29 CHRONIC PAIN OF BOTH KNEES: ICD-10-CM

## 2022-05-24 RX ORDER — DICLOFENAC SODIUM 75 MG/1
75 TABLET, DELAYED RELEASE ORAL 2 TIMES DAILY
Qty: 180 TABLET | Refills: 1 | Status: SHIPPED | OUTPATIENT
Start: 2022-05-24 | End: 2022-07-27 | Stop reason: CLARIF

## 2022-05-25 ENCOUNTER — TELEMEDICINE (OUTPATIENT)
Dept: FAMILY MEDICINE CLINIC | Facility: TELEHEALTH | Age: 67
End: 2022-05-25

## 2022-05-25 ENCOUNTER — TELEPHONE (OUTPATIENT)
Dept: FAMILY MEDICINE CLINIC | Facility: CLINIC | Age: 67
End: 2022-05-25

## 2022-05-25 VITALS — BODY MASS INDEX: 28.58 KG/M2 | HEIGHT: 69 IN | WEIGHT: 193 LBS | TEMPERATURE: 97 F

## 2022-05-25 DIAGNOSIS — U07.1 COVID: Primary | ICD-10-CM

## 2022-05-25 PROCEDURE — 99213 OFFICE O/P EST LOW 20 MIN: CPT | Performed by: NURSE PRACTITIONER

## 2022-05-25 NOTE — PROGRESS NOTES
You have chosen to receive care through a telehealth visit.  Do you consent to use a video/audio connection for your medical care today? Yes     CHIEF COMPLAINT  Cc: covid infection    HPI  Kt Davis is a 67 y.o. male  presents with complaint of a COVID infection. His wife tested positive 5 days ago.  He started developing symptoms on 05/24/2022. He tested positive for COVID on 05/25/2022. His symptoms are  listed in the ROS portion of this visit. The patient is up to date on his COVID vaccines via two doses of the Pfizer vaccine and a booster. His wife is taking Paxlovid. The patient is hoping to get started on this medication as soon as possible.    Review of Systems   Constitutional: Positive for fatigue. Negative for chills and fever.   HENT: Positive for congestion, postnasal drip, rhinorrhea and sneezing. Negative for sinus pressure, sinus pain, sore throat and tinnitus.         No loss of taste, cannot smell due to head trauma   Respiratory: Positive for cough and shortness of breath. Negative for chest tightness and wheezing.    Cardiovascular: Negative for chest pain.   Gastrointestinal: Negative for diarrhea, nausea and vomiting.   Musculoskeletal: Positive for myalgias.   Neurological: Negative for headaches.       Past Medical History:   Diagnosis Date   • Arthritis    • History of esophageal stricture 2015   • History of gastric ulcer 2017   • History of medical problems Concussion and brain bleed   • History of transfusion 2017    4 units due to GI bleed    • Inguinal hernia    • Peptic ulceration    • Post-traumatic brain syndrome 2018    r/t falling outside in winter. 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       Family History   Problem Relation Age of Onset   • Colon cancer Mother    • Parkinsonism Father    • Heart disease Father    • Arthritis Father    • Hyperlipidemia Father    • Allergies Daughter    • Asthma Daughter    • No Known Problems Son    • Other Maternal Grandmother         Unknown   • Heart block  "Maternal Grandfather    • Macular degeneration Paternal Grandmother    • Diverticulitis Paternal Grandfather    • Emphysema Paternal Grandfather    • Arthritis Brother        Social History     Socioeconomic History   • Marital status:      Spouse name: Shena   • Number of children: 2   • Years of education: College   Tobacco Use   • Smoking status: Never Smoker   • Smokeless tobacco: Never Used   Substance and Sexual Activity   • Alcohol use: No   • Drug use: No   • Sexual activity: Yes     Partners: Female     Birth control/protection: None     Comment:        Kt Davis  reports that he has never smoked. He has never used smokeless tobacco..       Temp 97 °F (36.1 °C)   Ht 175.3 cm (69\")   Wt 87.5 kg (193 lb)   BMI 28.50 kg/m²     PHYSICAL EXAM  Physical Exam   Constitutional: He is oriented to person, place, and time. He appears well-developed and well-nourished.   HENT:   Head: Normocephalic and atraumatic.   Right Ear: External ear normal.   Left Ear: External ear normal.   Nose: Congestion present. Right sinus exhibits no maxillary sinus tenderness and no frontal sinus tenderness. Left sinus exhibits no maxillary sinus tenderness and no frontal sinus tenderness.   Mouth/Throat: Mouth/Lips are normal.  Eyes: Lids are normal. Right eye exhibits no discharge and no exudate. Left eye exhibits no discharge and no exudate. Right conjunctiva is not injected. Left conjunctiva is not injected.   Pulmonary/Chest: No accessory muscle usage. No tachypnea and no bradypnea.  No respiratory distress (intermttent dry cough noted at visit).No use of oxygen by nasal cannulaNo use of oxygen by mask noted.  Abdominal: Abdomen appears normal.   Neurological: He is alert and oriented to person, place, and time. No cranial nerve deficit.   Skin: His skin appears normal.  Psychiatric: He has a normal mood and affect. His speech is normal and behavior is normal. Judgment and thought content normal.       Results " for orders placed or performed in visit on 03/09/22   Comprehensive Metabolic Panel    Specimen: Blood   Result Value Ref Range    Glucose 123 (H) 65 - 99 mg/dL    BUN 17 8 - 23 mg/dL    Creatinine 1.11 0.76 - 1.27 mg/dL    Sodium 140 136 - 145 mmol/L    Potassium 4.4 3.5 - 5.2 mmol/L    Chloride 102 98 - 107 mmol/L    CO2 24.4 22.0 - 29.0 mmol/L    Calcium 10.1 8.6 - 10.5 mg/dL    Total Protein 7.4 6.0 - 8.5 g/dL    Albumin 4.40 3.50 - 5.20 g/dL    ALT (SGPT) 23 1 - 41 U/L    AST (SGOT) 21 1 - 40 U/L    Alkaline Phosphatase 81 39 - 117 U/L    Total Bilirubin 0.4 0.0 - 1.2 mg/dL    Globulin 3.0 gm/dL    A/G Ratio 1.5 g/dL    BUN/Creatinine Ratio 15.3 7.0 - 25.0    Anion Gap 13.6 5.0 - 15.0 mmol/L    eGFR 72.3 >60.0 mL/min/1.73   Lipid Panel    Specimen: Blood   Result Value Ref Range    Total Cholesterol 181 0 - 200 mg/dL    Triglycerides 177 (H) 0 - 150 mg/dL    HDL Cholesterol 38 (L) 40 - 60 mg/dL    LDL Cholesterol  112 (H) 0 - 100 mg/dL    VLDL Cholesterol 31 5 - 40 mg/dL    LDL/HDL Ratio 2.83    PSA Screen    Specimen: Blood   Result Value Ref Range    PSA 0.407 0.000 - 4.000 ng/mL   CBC Auto Differential    Specimen: Blood   Result Value Ref Range    WBC 10.13 3.40 - 10.80 10*3/mm3    RBC 4.63 4.14 - 5.80 10*6/mm3    Hemoglobin 15.4 13.0 - 17.7 g/dL    Hematocrit 44.3 37.5 - 51.0 %    MCV 95.7 79.0 - 97.0 fL    MCH 33.3 (H) 26.6 - 33.0 pg    MCHC 34.8 31.5 - 35.7 g/dL    RDW 11.7 (L) 12.3 - 15.4 %    RDW-SD 40.1 37.0 - 54.0 fl    MPV 9.9 6.0 - 12.0 fL    Platelets 226 140 - 450 10*3/mm3    Neutrophil % 70.7 42.7 - 76.0 %    Lymphocyte % 16.8 (L) 19.6 - 45.3 %    Monocyte % 9.1 5.0 - 12.0 %    Eosinophil % 2.5 0.3 - 6.2 %    Basophil % 0.6 0.0 - 1.5 %    Immature Grans % 0.3 0.0 - 0.5 %    Neutrophils, Absolute 7.17 (H) 1.70 - 7.00 10*3/mm3    Lymphocytes, Absolute 1.70 0.70 - 3.10 10*3/mm3    Monocytes, Absolute 0.92 (H) 0.10 - 0.90 10*3/mm3    Eosinophils, Absolute 0.25 0.00 - 0.40 10*3/mm3    Basophils,  Absolute 0.06 0.00 - 0.20 10*3/mm3    Immature Grans, Absolute 0.03 0.00 - 0.05 10*3/mm3    nRBC 0.0 0.0 - 0.2 /100 WBC       Diagnoses and all orders for this visit:    1. COVID (Primary)    Other orders  -     Nirmatrelvir & Ritonavir (PAXLOVID) 20 x 150 MG & 10 x 100MG tablet therapy pack tablet; Take 3 tablets by mouth 2 (Two) Times a Day for 5 days.  Dispense: 30 tablet; Refill: 0    The patient has read the Paxlovid fact sheet and is aware that is a EUA medication  Monitor blood pressure fro hypotension(low blood pressure)related to amlodipine and paxlovid interaction  May alternate tylenol and ibuprofen  Hydrate well  May take vitamins C, D and Zinc  Alternate rest and mild exercise as tolerated  Isolate per CDC guidelines    FOLLOW-UP    Patient verbalizes understanding of medication dosage, comfort measures, instructions for treatment and follow-up.    Melonie Hilliard, APRN  05/25/2022  19:51 EDT    The use of a video visit has been reviewed with the patient and verbal informed consent has been obtained. Myself and Kt Davis participated in this visit. The patient is located in [address] [place] [address].    I am located in [place], [place]. Mychart and Zoom were utilized. I spent 25 minutes in the patient's chart for this visit.

## 2022-05-25 NOTE — TELEPHONE ENCOUNTER
PATIENT CALLED TO CHECK ON STATUS OF EARLIER MESSAGE REGARDING COVID POSITIVE; PLEASE CALL PATIENT BACK WHEN THIS IS SENT TO PHARMACY    PHONE: [phone]

## 2022-05-25 NOTE — TELEPHONE ENCOUNTER
Caller: Kt Davis    Relationship to patient: Self    Best call back number: [phone]    Date of exposure: 05/19/2022    Date of positive COVID19 test: 05/25/2022    COVID19 symptoms:   COUGH AND FATIGUE     Date of initial quarantine: 05/24/2022    Additional information or concerns:   PATIENT WOULD LIKE FOR A ANTIVIRAL MEDICATION (PAXLOVID)  TO BE CALLED INTO THE PHARMACY FOR HIS CURRENT COVID SYMPTOMS     What is the patients preferred pharmacy:  [id number] - [place] [address] [address] AT [facility] [place] [facility] [address] [facility] - [phone] [facility] [phone]   [phone]

## 2022-05-25 NOTE — TELEPHONE ENCOUNTER
Please have patient schedule an appointment with myself or another provider in the office for a telemedicine visit to discuss treatment options.

## 2022-07-05 DIAGNOSIS — I10 ESSENTIAL HYPERTENSION: Primary | ICD-10-CM

## 2022-07-05 RX ORDER — METOPROLOL SUCCINATE 25 MG/1
25 TABLET, EXTENDED RELEASE ORAL DAILY
Qty: 30 TABLET | Refills: 3 | Status: SHIPPED | OUTPATIENT
Start: 2022-07-05 | End: 2022-07-27 | Stop reason: CLARIF

## 2022-07-27 ENCOUNTER — OFFICE VISIT (OUTPATIENT)
Dept: FAMILY MEDICINE CLINIC | Facility: CLINIC | Age: 67
End: 2022-07-27

## 2022-07-27 ENCOUNTER — LAB (OUTPATIENT)
Dept: LAB | Facility: HOSPITAL | Age: 67
End: 2022-07-27

## 2022-07-27 VITALS
TEMPERATURE: 98 F | DIASTOLIC BLOOD PRESSURE: 88 MMHG | HEIGHT: 69 IN | BODY MASS INDEX: 28.73 KG/M2 | WEIGHT: 194 LBS | HEART RATE: 84 BPM | SYSTOLIC BLOOD PRESSURE: 154 MMHG | OXYGEN SATURATION: 97 % | RESPIRATION RATE: 20 BRPM

## 2022-07-27 DIAGNOSIS — E55.9 VITAMIN D DEFICIENCY: ICD-10-CM

## 2022-07-27 DIAGNOSIS — I10 ESSENTIAL HYPERTENSION: ICD-10-CM

## 2022-07-27 DIAGNOSIS — I10 ESSENTIAL HYPERTENSION: Primary | ICD-10-CM

## 2022-07-27 DIAGNOSIS — M62.81 QUADRICEPS WEAKNESS: ICD-10-CM

## 2022-07-27 DIAGNOSIS — Z11.59 ENCOUNTER FOR SCREENING FOR OTHER VIRAL DISEASES: ICD-10-CM

## 2022-07-27 DIAGNOSIS — R29.898 LEG HEAVINESS: ICD-10-CM

## 2022-07-27 LAB
25(OH)D3 SERPL-MCNC: 37 NG/ML (ref 30–100)
ALBUMIN SERPL-MCNC: 4.5 G/DL (ref 3.5–5.2)
ALBUMIN/GLOB SERPL: 2 G/DL
ALP SERPL-CCNC: 85 U/L (ref 39–117)
ALT SERPL W P-5'-P-CCNC: 19 U/L (ref 1–41)
ANION GAP SERPL CALCULATED.3IONS-SCNC: 10 MMOL/L (ref 5–15)
AST SERPL-CCNC: 15 U/L (ref 1–40)
BASOPHILS # BLD AUTO: 0.06 10*3/MM3 (ref 0–0.2)
BASOPHILS NFR BLD AUTO: 0.7 % (ref 0–1.5)
BILIRUB SERPL-MCNC: 0.5 MG/DL (ref 0–1.2)
BUN SERPL-MCNC: 13 MG/DL (ref 8–23)
BUN/CREAT SERPL: 13 (ref 7–25)
CALCIUM SPEC-SCNC: 9.6 MG/DL (ref 8.6–10.5)
CHLORIDE SERPL-SCNC: 104 MMOL/L (ref 98–107)
CK SERPL-CCNC: 36 U/L (ref 20–200)
CO2 SERPL-SCNC: 23 MMOL/L (ref 22–29)
CREAT SERPL-MCNC: 1 MG/DL (ref 0.76–1.27)
DEPRECATED RDW RBC AUTO: 41.1 FL (ref 37–54)
EGFRCR SERPLBLD CKD-EPI 2021: 82 ML/MIN/1.73
EOSINOPHIL # BLD AUTO: 0.17 10*3/MM3 (ref 0–0.4)
EOSINOPHIL NFR BLD AUTO: 2 % (ref 0.3–6.2)
ERYTHROCYTE [DISTWIDTH] IN BLOOD BY AUTOMATED COUNT: 12.1 % (ref 12.3–15.4)
GLOBULIN UR ELPH-MCNC: 2.3 GM/DL
GLUCOSE SERPL-MCNC: 192 MG/DL (ref 65–99)
HCT VFR BLD AUTO: 44 % (ref 37.5–51)
HCV AB SER DONR QL: NORMAL
HGB BLD-MCNC: 15.5 G/DL (ref 13–17.7)
IMM GRANULOCYTES # BLD AUTO: 0.03 10*3/MM3 (ref 0–0.05)
IMM GRANULOCYTES NFR BLD AUTO: 0.3 % (ref 0–0.5)
LDH SERPL-CCNC: 173 U/L (ref 135–225)
LYMPHOCYTES # BLD AUTO: 1.65 10*3/MM3 (ref 0.7–3.1)
LYMPHOCYTES NFR BLD AUTO: 19.1 % (ref 19.6–45.3)
MAGNESIUM SERPL-MCNC: 2.1 MG/DL (ref 1.6–2.4)
MCH RBC QN AUTO: 33.2 PG (ref 26.6–33)
MCHC RBC AUTO-ENTMCNC: 35.2 G/DL (ref 31.5–35.7)
MCV RBC AUTO: 94.2 FL (ref 79–97)
MONOCYTES # BLD AUTO: 0.75 10*3/MM3 (ref 0.1–0.9)
MONOCYTES NFR BLD AUTO: 8.7 % (ref 5–12)
NEUTROPHILS NFR BLD AUTO: 5.98 10*3/MM3 (ref 1.7–7)
NEUTROPHILS NFR BLD AUTO: 69.2 % (ref 42.7–76)
NRBC BLD AUTO-RTO: 0 /100 WBC (ref 0–0.2)
PLATELET # BLD AUTO: 227 10*3/MM3 (ref 140–450)
PMV BLD AUTO: 9.8 FL (ref 6–12)
POTASSIUM SERPL-SCNC: 4.1 MMOL/L (ref 3.5–5.2)
PROT SERPL-MCNC: 6.8 G/DL (ref 6–8.5)
RBC # BLD AUTO: 4.67 10*6/MM3 (ref 4.14–5.8)
SODIUM SERPL-SCNC: 137 MMOL/L (ref 136–145)
TSH SERPL DL<=0.05 MIU/L-ACNC: 0.99 UIU/ML (ref 0.27–4.2)
WBC NRBC COR # BLD: 8.64 10*3/MM3 (ref 3.4–10.8)

## 2022-07-27 PROCEDURE — 83516 IMMUNOASSAY NONANTIBODY: CPT

## 2022-07-27 PROCEDURE — 99214 OFFICE O/P EST MOD 30 MIN: CPT | Performed by: FAMILY MEDICINE

## 2022-07-27 PROCEDURE — 83735 ASSAY OF MAGNESIUM: CPT

## 2022-07-27 PROCEDURE — 86037 ANCA TITER EACH ANTIBODY: CPT

## 2022-07-27 PROCEDURE — 85025 COMPLETE CBC W/AUTO DIFF WBC: CPT

## 2022-07-27 PROCEDURE — 86235 NUCLEAR ANTIGEN ANTIBODY: CPT

## 2022-07-27 PROCEDURE — 82595 ASSAY OF CRYOGLOBULIN: CPT

## 2022-07-27 PROCEDURE — 82550 ASSAY OF CK (CPK): CPT

## 2022-07-27 PROCEDURE — 80053 COMPREHEN METABOLIC PANEL: CPT

## 2022-07-27 PROCEDURE — 86803 HEPATITIS C AB TEST: CPT

## 2022-07-27 PROCEDURE — 84443 ASSAY THYROID STIM HORMONE: CPT

## 2022-07-27 PROCEDURE — 82306 VITAMIN D 25 HYDROXY: CPT

## 2022-07-27 PROCEDURE — 83615 LACTATE (LD) (LDH) ENZYME: CPT

## 2022-07-27 PROCEDURE — G0499 HEPB SCREEN HIGH RISK INDIV: HCPCS

## 2022-07-27 RX ORDER — AMLODIPINE BESYLATE 5 MG/1
5 TABLET ORAL DAILY
Qty: 30 TABLET | Refills: 3 | Status: SHIPPED | OUTPATIENT
Start: 2022-07-27 | End: 2022-10-26 | Stop reason: SDUPTHER

## 2022-07-27 RX ORDER — CELECOXIB 200 MG/1
200 CAPSULE ORAL 2 TIMES DAILY
Status: SHIPPED | COMMUNITY
Start: 2022-07-27 | End: 2022-10-26

## 2022-07-27 RX ORDER — CELECOXIB 100 MG/1
100 CAPSULE ORAL 2 TIMES DAILY PRN
COMMUNITY
End: 2022-07-27

## 2022-07-27 NOTE — PROGRESS NOTES
Kt Davis is a 67 y.o. male who presents today for Hypertension      Patient has not had a chance to get into see dermatology. He has two lesions on his face and they have not changed since he was seen last. Lesions are red with some scaling concerning for actinic keratosis. He was taking losartan and it was thought it may be contributing to leg weakness. He was started on amlodipine and was having good control of blood pressure with 5mg of amlodipine. Amlodipine was discontinued for metoprolol and pain has not changed but blood pressure has been poorly controlled. When he was on amlodipine he was getting numbers in the 130-140/80 range. Leg weakness and heaviness did not improve with discontinuing losartan. He has had continued worsening of leg weakness with occasional pain. He describes the pain as an aching pain and rarely cramps. When he is sitting and goes to standing he has a painful tightness in his thighs that feels like a tearing. On bad days he feels like the legs get weaker and weaker the more he walks. On good days he can walk for up to 5 miles with out it worsening but the following days symptoms are worsened. He has the most difficulty going up and down the stairs. He has seen PT in the past and they always noted that his thigh muscles were tight. Joint pain has been well controlled with diclofenac. He has no weakness in other muscle groups just in the quadriceps. This problem started 2 years ago and was sporadic in nature but has become more constant over the last year.        Review of Systems   Constitutional: Negative for fever and unexpected weight loss.   HENT: Negative for congestion, ear pain and sore throat.    Eyes: Negative for visual disturbance.   Respiratory: Negative for cough, shortness of breath and wheezing.    Cardiovascular: Negative for chest pain and palpitations.   Gastrointestinal: Negative for abdominal pain, blood in stool, constipation, diarrhea, nausea, vomiting and  "GERD.   Endocrine: Negative for polydipsia and polyuria.   Genitourinary: Negative for difficulty urinating, dysuria, frequency and hematuria.   Musculoskeletal: Positive for arthralgias (improving). Negative for gait problem, joint swelling, myalgias, neck pain and bursitis.   Skin: Negative for rash, skin lesions and bruise.   Allergic/Immunologic: Negative for environmental allergies.   Neurological: Positive for weakness (quadraceps). Negative for seizures, syncope and numbness.   Hematological: Does not bruise/bleed easily.   Psychiatric/Behavioral: Negative for suicidal ideas.        The following portions of the patient's history were reviewed and updated as appropriate: allergies, current medications, past family history, past medical history, past social history, past surgical history and problem list.    Current Outpatient Medications on File Prior to Visit   Medication Sig Dispense Refill   • acetaminophen (TYLENOL) 650 MG 8 hr tablet Take 650 mg by mouth Every 8 (Eight) Hours As Needed for Mild Pain .     • celecoxib (CeleBREX) 200 MG capsule Take 1 capsule by mouth 2 (Two) Times a Day As Needed.     • Dextromethorphan-guaiFENesin (MUCUS RELIEF DM PO) Take 1,200 mg by mouth As Needed.     • Multiple Vitamins-Minerals (MULTIVITAL PO) Take 1 dose by mouth Every Morning.       No current facility-administered medications on file prior to visit.       Allergies   Allergen Reactions   • Hctz [Hydrochlorothiazide] Myalgia     Muscle weakness   • Losartan Myalgia     Muscle weakness   • Lisinopril Cough   • Valsartan Cough        Visit Vitals  /88   Pulse 84   Temp 98 °F (36.7 °C)   Resp 20   Ht 175.3 cm (69\")   Wt 88 kg (194 lb)   SpO2 97%   BMI 28.65 kg/m²        Physical Exam  Constitutional:       General: He is not in acute distress.     Appearance: He is well-developed. He is not diaphoretic.   HENT:      Head: Atraumatic.   Cardiovascular:      Rate and Rhythm: Normal rate and regular rhythm.      " Heart sounds: Normal heart sounds. No murmur heard.    No friction rub. No gallop.   Pulmonary:      Effort: Pulmonary effort is normal. No respiratory distress.      Breath sounds: Normal breath sounds. No stridor. No wheezing, rhonchi or rales.   Musculoskeletal:      Cervical back: Normal range of motion and neck supple.      Right hip: Decreased range of motion (due to pain).      Left hip: Normal range of motion.      Right upper leg: No swelling, edema, deformity or tenderness.      Left upper leg: No swelling, edema, deformity or tenderness.      Comments: Strength is 5/5 in lower extremities bilaterally. Of note quadriceps muscles are tight on exam.    Skin:     General: Skin is warm and dry.   Neurological:      Mental Status: He is alert and oriented to person, place, and time.   Psychiatric:         Behavior: Behavior normal.               Problems Addressed this Visit        Cardiac and Vasculature    Essential hypertension - Primary     Hypertension is worsening.  We will discontinue metoprolol and restart amlodipine at 5 mg daily.  Blood pressure will be reassessed in 3 months.         Relevant Medications    amLODIPine (NORVASC) 5 MG tablet    Other Relevant Orders    Comprehensive Metabolic Panel (Completed)    CBC & Differential (Completed)       Musculoskeletal and Injuries    Quadriceps weakness     Patient continues to have weakness and tightness in his quadriceps.  Differential diagnosis for this remains broad at this time.  Have not been able to find a medication because of this.  Order labs for further evaluation.  Patient was given referral to neurology for further evaluation and treatment.  RTC/ED precautions given.         Relevant Orders    Comprehensive Metabolic Panel (Completed)    CBC & Differential (Completed)    CK (Completed)    Lactate Dehydrogenase (Completed)    TSH Rfx On Abnormal To Free T4 (Completed)    Vitamin D 25 Hydroxy (Completed)    Magnesium (Completed)    Sjogrens  Syndrome-A Extractable Nuclear Antibody    RNP Antibodies    Sjogren's Antibody, Anti-SS-A / -SS-B    Anti-Silvina 1 Antibody, IgG    ANCA Panel    Cryoglobulin    Hepatitis B Virus (HBV) Screening and Diagnosis (Completed)    Hepatitis C antibody (Completed)    Anti-Smith Antibody    Ambulatory Referral to Neurology    Leg heaviness    Relevant Orders    Comprehensive Metabolic Panel (Completed)    CBC & Differential (Completed)    CK (Completed)    Lactate Dehydrogenase (Completed)    TSH Rfx On Abnormal To Free T4 (Completed)    Vitamin D 25 Hydroxy (Completed)    Magnesium (Completed)    Sjogrens Syndrome-A Extractable Nuclear Antibody    RNP Antibodies    Sjogren's Antibody, Anti-SS-A / -SS-B    Anti-Silvina 1 Antibody, IgG    ANCA Panel    Cryoglobulin    Hepatitis B Virus (HBV) Screening and Diagnosis (Completed)    Hepatitis C antibody (Completed)    Anti-Smith Antibody    Ambulatory Referral to Neurology      Other Visit Diagnoses     Vitamin D deficiency        Relevant Orders    Vitamin D 25 Hydroxy (Completed)    Encounter for screening for other viral diseases         Relevant Orders    Hepatitis B Virus (HBV) Screening and Diagnosis (Completed)      Diagnoses       Codes Comments    Essential hypertension    -  Primary ICD-10-CM: I10  ICD-9-CM: 401.9     Quadriceps weakness     ICD-10-CM: M62.81  ICD-9-CM: 728.87     Leg heaviness     ICD-10-CM: R29.898  ICD-9-CM: 729.89     Vitamin D deficiency     ICD-10-CM: E55.9  ICD-9-CM: 268.9     Encounter for screening for other viral diseases      ICD-10-CM: Z11.59  ICD-9-CM: V73.89           Return in about 3 months (around 10/27/2022) for Follow-up HTN, leg weakness.    Tani Vazquez MD   7/28/2022

## 2022-07-28 LAB
C-ANCA TITR SER IF: NORMAL TITER
ENA JO1 AB SER-ACNC: <0.2 AI (ref 0–0.9)
ENA RNP AB SER-ACNC: 0.3 AI (ref 0–0.9)
ENA SM AB SER-ACNC: <0.2 AI (ref 0–0.9)
ENA SS-A AB SER-ACNC: 0.2 AI (ref 0–0.9)
ENA SS-A AB SER-ACNC: 0.2 AI (ref 0–0.9)
ENA SS-B AB SER-ACNC: <0.2 AI (ref 0–0.9)
HBV CORE AB SERPL QL IA: NEGATIVE
HBV SURFACE AB SER QL: REACTIVE
HBV SURFACE AG SERPL QL IA: NEGATIVE
IMP & REVIEW OF LAB RESULTS: NORMAL
LABORATORY COMMENT REPORT: NORMAL
MYELOPEROXIDASE AB SER IA-ACNC: <0.2 UNITS (ref 0–0.9)
P-ANCA ATYPICAL TITR SER IF: NORMAL TITER
P-ANCA TITR SER IF: NORMAL TITER
PROTEINASE3 AB SER IA-ACNC: <0.2 UNITS (ref 0–0.9)

## 2022-07-28 NOTE — ASSESSMENT & PLAN NOTE
Patient continues to have weakness and tightness in his quadriceps.  Differential diagnosis for this remains broad at this time.  Have not been able to find a medication because of this.  Order labs for further evaluation.  Patient was given referral to neurology for further evaluation and treatment.  RTC/ED precautions given.

## 2022-07-28 NOTE — ASSESSMENT & PLAN NOTE
Hypertension is worsening.  We will discontinue metoprolol and restart amlodipine at 5 mg daily.  Blood pressure will be reassessed in 3 months.

## 2022-08-01 LAB — CRYOGLOB SER QL 1D COLD INC: NORMAL

## 2022-08-22 DIAGNOSIS — G89.29 CHRONIC PAIN OF BOTH KNEES: Primary | ICD-10-CM

## 2022-08-22 DIAGNOSIS — G89.29 CHRONIC RIGHT HIP PAIN: ICD-10-CM

## 2022-08-22 DIAGNOSIS — R29.898 LEG HEAVINESS: ICD-10-CM

## 2022-08-22 DIAGNOSIS — M25.551 CHRONIC RIGHT HIP PAIN: ICD-10-CM

## 2022-08-22 DIAGNOSIS — M25.561 CHRONIC PAIN OF BOTH KNEES: Primary | ICD-10-CM

## 2022-08-22 DIAGNOSIS — M25.562 CHRONIC PAIN OF BOTH KNEES: Primary | ICD-10-CM

## 2022-08-22 DIAGNOSIS — M62.81 QUADRICEPS WEAKNESS: ICD-10-CM

## 2022-08-22 DIAGNOSIS — M25.552 LEFT HIP PAIN: ICD-10-CM

## 2022-08-23 ENCOUNTER — HOSPITAL ENCOUNTER (OUTPATIENT)
Dept: GENERAL RADIOLOGY | Facility: HOSPITAL | Age: 67
Discharge: HOME | End: 2022-08-23
Admitting: FAMILY MEDICINE

## 2022-08-23 DIAGNOSIS — M25.561 CHRONIC PAIN OF BOTH KNEES: ICD-10-CM

## 2022-08-23 DIAGNOSIS — M25.552 LEFT HIP PAIN: ICD-10-CM

## 2022-08-23 DIAGNOSIS — M25.562 CHRONIC PAIN OF BOTH KNEES: ICD-10-CM

## 2022-08-23 DIAGNOSIS — G89.29 CHRONIC PAIN OF BOTH KNEES: ICD-10-CM

## 2022-08-23 PROCEDURE — 73560 X-RAY EXAM OF KNEE 1 OR 2: CPT

## 2022-08-23 PROCEDURE — 73502 X-RAY EXAM HIP UNI 2-3 VIEWS: CPT

## 2022-08-26 ENCOUNTER — TELEPHONE (OUTPATIENT)
Dept: ORTHOPEDIC SURGERY | Facility: CLINIC | Age: 67
End: 2022-08-26

## 2022-08-26 ENCOUNTER — TELEPHONE (OUTPATIENT)
Dept: FAMILY MEDICINE CLINIC | Facility: CLINIC | Age: 67
End: 2022-08-26

## 2022-08-26 DIAGNOSIS — M25.551 CHRONIC RIGHT HIP PAIN: Primary | ICD-10-CM

## 2022-08-26 DIAGNOSIS — G89.29 CHRONIC RIGHT HIP PAIN: Primary | ICD-10-CM

## 2022-08-26 DIAGNOSIS — M25.561 CHRONIC PAIN OF BOTH KNEES: ICD-10-CM

## 2022-08-26 DIAGNOSIS — M87.051 AVASCULAR NECROSIS OF HIP, RIGHT: ICD-10-CM

## 2022-08-26 DIAGNOSIS — G89.29 CHRONIC PAIN OF BOTH KNEES: ICD-10-CM

## 2022-08-26 DIAGNOSIS — M62.81 QUADRICEPS WEAKNESS: ICD-10-CM

## 2022-08-26 DIAGNOSIS — R29.898 LEG HEAVINESS: ICD-10-CM

## 2022-08-26 DIAGNOSIS — M25.562 CHRONIC PAIN OF BOTH KNEES: ICD-10-CM

## 2022-08-26 NOTE — TELEPHONE ENCOUNTER
Provider: DR. SINGH    Caller: PATIENT    Relationship to Patient: SELF      Phone Number: [phone]    Reason for Call: PT. SAW DR. SINGH AND HAD RIGHT HIP XRAY ON 03/01/2021.   ASKING IF THESE RECORDS CAN BE SENT TO [facility]# [phone].  HE DOES NOT HAVE THEIR FAX #     When was the patient last seen: 03/2021

## 2022-08-26 NOTE — TELEPHONE ENCOUNTER
Caller: Kt Davis    Relationship: Self    Best call back number: [phone]    What form or medical record are you requesting: LOKI SAYS THE ORTHOPEDIC DR IS ASKING FOR ALL OF HIS X-RAYS OF BOTH HIPS AND BOTH KNEES.  HE IS SEEING 2 DIFF DRS FOR THIS-HE NEEDS 2 SETS.      Who is requesting this form or medical record from you: LOKI     How would you like to receive the form or medical records (pick-up, mail, fax):  WILL      Timeframe paperwork needed: HIS APPT IS [id number].  HE WILL PICK THEM UP.      Additional notes: CALL HIM WHEN READY.

## 2022-08-26 NOTE — TELEPHONE ENCOUNTER
Spoke with the pt advised the pt that he will need to contact the [facility] diagnostic center to get a copy of his imaging on a disc.     Provided the pt with the phone number to the diagnostic center to contact. Pt verbalizes understanding has no further questions at this time.

## 2022-09-01 ENCOUNTER — OFFICE VISIT (OUTPATIENT)
Dept: ORTHOPEDIC SURGERY | Facility: CLINIC | Age: 67
End: 2022-09-01

## 2022-09-01 VITALS
SYSTOLIC BLOOD PRESSURE: 170 MMHG | BODY MASS INDEX: 28.73 KG/M2 | HEIGHT: 69 IN | WEIGHT: 194 LBS | DIASTOLIC BLOOD PRESSURE: 90 MMHG

## 2022-09-01 DIAGNOSIS — M87.051 AVASCULAR NECROSIS OF BONE OF RIGHT HIP: ICD-10-CM

## 2022-09-01 DIAGNOSIS — M16.11 PRIMARY OSTEOARTHRITIS OF RIGHT HIP: Primary | ICD-10-CM

## 2022-09-01 DIAGNOSIS — M16.12 PRIMARY OSTEOARTHRITIS OF LEFT HIP: ICD-10-CM

## 2022-09-01 PROCEDURE — 99214 OFFICE O/P EST MOD 30 MIN: CPT | Performed by: ORTHOPAEDIC SURGERY

## 2022-09-01 RX ORDER — PREGABALIN 75 MG/1
75 CAPSULE ORAL ONCE
Status: CANCELLED | OUTPATIENT
Start: 2022-09-01 | End: 2022-09-01

## 2022-09-01 RX ORDER — ACETAMINOPHEN 325 MG/1
1000 TABLET ORAL ONCE
Status: CANCELLED | OUTPATIENT
Start: 2022-09-01 | End: 2022-09-01

## 2022-09-01 RX ORDER — MELOXICAM 7.5 MG/1
15 TABLET ORAL ONCE
Status: CANCELLED | OUTPATIENT
Start: 2022-09-01 | End: 2022-09-01

## 2022-09-01 NOTE — PROGRESS NOTES
Orthopaedic Clinic Note: Hip Established Patient    Chief Complaint   Patient presents with   • Follow-up     17 month recheck - Primary osteoarthritis of right hip and Avascular necrosis of bone of right hip             HPI    It has been 17  month(s) since 's last visit. He returns to clinic today for follow-up right hip osteoarthritis.  He comes going today complaining of bilateral hip pain that has gotten progressively worse over the past 2 years.  He is taking Celebrex with minimal improvement.  Rates his pain 8/10 on the pain scale.  Localizes the pain to the groin bilaterally.  Denies fevers chills or constitutional symptoms.  Overall he is doing worse.    Past Medical History:   Diagnosis Date   • Arthritis    • Hip arthrosis 01/2021   • History of esophageal stricture 2015   • History of gastric ulcer 2017   • History of medical problems Concussion and brain bleed   • History of transfusion 2017    4 units due to GI bleed    • Inguinal hernia    • Knee swelling 01/2022   • Peptic ulceration    • Post-traumatic brain syndrome 2018    r/t falling outside in winter. 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      Past Surgical History:   Procedure Laterality Date   • APPENDECTOMY  1999   • COLONOSCOPY  2015   • ESOPHAGEAL DILATATION  2015   • INGUINAL HERNIA REPAIR Bilateral 06/18/2020    Procedure: INGUINAL HERNIA REPAIR BILATERAL WITH MESH;  Surgeon: Johnathan Durant MD;  Location: [facility];  Service: General;  Laterality: Bilateral;   • INGUINAL HERNIA REPAIR     • TONSILLECTOMY     • TONSILLECTOMY AND ADENOIDECTOMY  1959   • UPPER GASTROINTESTINAL ENDOSCOPY  2015      Family History   Problem Relation Age of Onset   • Colon cancer Mother    • Cancer Mother         Colon Cancer at age 83   • Osteoporosis Mother    • Parkinsonism Father    • Heart disease Father    • Arthritis Father    • Hyperlipidemia Father    • Broken bones Father         Broken Leg   • Allergies Daughter    • Asthma Daughter    • No Known  Problems Son    • Other Maternal Grandmother         Unknown   • Heart block Maternal Grandfather    • Macular degeneration Paternal Grandmother    • Diverticulitis Paternal Grandfather    • Emphysema Paternal Grandfather    • Arthritis Brother      Social History     Socioeconomic History   • Marital status:      Spouse name: Shena   • Number of children: 2   • Years of education: College   Tobacco Use   • Smoking status: Never Smoker   • Smokeless tobacco: Never Used   Substance and Sexual Activity   • Alcohol use: No   • Drug use: No   • Sexual activity: Yes     Partners: Female     Birth control/protection: None     Comment:       Current Outpatient Medications on File Prior to Visit   Medication Sig Dispense Refill   • acetaminophen (TYLENOL) 650 MG 8 hr tablet Take 650 mg by mouth Every 8 (Eight) Hours As Needed for Mild Pain .     • amLODIPine (NORVASC) 5 MG tablet Take 1 tablet by mouth Daily. 30 tablet 3   • celecoxib (CeleBREX) 200 MG capsule Take 1 capsule by mouth 2 (Two) Times a Day As Needed.     • Multiple Vitamins-Minerals (MULTIVITAL PO) Take 1 dose by mouth Every Morning.     • Dextromethorphan-guaiFENesin (MUCUS RELIEF DM PO) Take 1,200 mg by mouth As Needed.       No current facility-administered medications on file prior to visit.      Allergies   Allergen Reactions   • Hctz [Hydrochlorothiazide] Myalgia     Muscle weakness   • Losartan Myalgia     Muscle weakness   • Lisinopril Cough   • Valsartan Cough        Review of Systems   Constitutional: Positive for activity change.   HENT: Negative.    Eyes: Negative.    Respiratory: Negative.    Cardiovascular: Negative.    Gastrointestinal: Negative.    Endocrine: Negative.    Genitourinary: Negative.    Musculoskeletal: Positive for arthralgias and gait problem.   Skin: Negative.    Allergic/Immunologic: Negative.    Hematological: Negative.    Psychiatric/Behavioral: Negative.         The patient's Review of Systems was personally  "reviewed and confirmed as accurate.    Physical Exam  Blood pressure 170/90, height 175.3 cm (69.02\"), weight 88 kg (194 lb).    Body mass index is 28.64 kg/m².    GENERAL APPEARANCE: awake, alert, oriented, in no acute distress and well developed, well nourished  LUNGS:  breathing nonlabored  EXTREMITIES: no clubbing, cyanosis  PERIPHERAL PULSES: palpable dorsalis pedis and posterior tibial pulses bilaterally.    GAIT:  Antalgic            Hip Exam:  Bilateral    RANGE OF MOTION:  EXTENSION/FLEXION:  normal (0-110 degrees)  IR (at 90 degrees of flexion):  neutral  ER (at 90 degrees of flexion):  20  PAIN WITH HIP MOTION:  yes, Localized to groin bilaterally  PAIN WITH LOGROLL:  no     STINCHFIELD TEST: positive    STRENGTH:  ABDUCTOR:  4/5  ADDUCTOR:  5/5  HIP FLEXION:  5/5    GREATER TROCHANTER BURSAL PAIN:  yes    SENSATION TO LIGHT TOUCH:  DEEP PERONEAL/SUPERFICIAL PERONEAL/SURAL/SAPHENOUS/TIBIAL:   intact    EDEMA:  no  ERYTHEMA:  no  WOUNDS/INCISIONS:   no  _________________________________________________________________  _________________________________________________________________    RADIOGRAPHIC FINDINGS:   Indication: Bilateral hip pain    Comparison: Todays xrays were compared to previous xrays from 8/23/2022    AP pelvis: Right: advanced, end-stage osteoarthritis with bone on bone articulation, subchondral sclerosis, and subchondral cysts, there are marginal osteophytes visualized at the femoral head-neck junction and acetabular margins and No significant changes compared to prior radiographs.;Left: advanced, end-stage osteoarthritis with bone on bone articulation, subchondral sclerosis, and subchondral cysts, there are marginal osteophytes visualized at the femoral head-neck junction and acetabular margins and No significant changes compared to prior radiographs.      Assessment/Plan:   Diagnosis Plan   1. Primary osteoarthritis of right hip  XR Pelvis 1 or 2 View    Case Request    CBC and " Differential    Comprehensive metabolic panel    Protime-INR    APTT    Hemoglobin A1c    ECG 12 Lead    Nicotine & Metabolite, Quant    Case Request   2. Avascular necrosis of bone of right hip (HCC)     3. Primary osteoarthritis of left hip       Patient suffering with end-stage osteoarthritis of the bilateral hips.  I discussed treatment options.  He has failed oral anti-inflammatory prescription.  Given the severity of his arthritis and limitations in hip motion, I do not believe further conservative treatment will be of benefit.  I recommend proceeding with staged total hip arthroplasty, starting with the right side as that is the most symptomatic.  He is agreeable to this plan.    The patient has clinical and radiographic evidence of end-stage right hip joint degeneration. Conservative measures have been tried for 3 months or longer, but have failed to adequately treat or improve the patient's symptoms. Pain is restricting the patient's daily activities as well as quality of life. The recommendation at this time is to proceed with a right total hip arthroplasty with the goal to improve patient function and pain. The risks, benefits, potential complications, and alternatives were discussed with the patient in detail. Risks included but were not limited to bleeding, infection, anesthesia risks, damage to neurovascular structures, osteolysis, aseptic loosening, instability, dislocation, pain, continued pain, iatrogenic fracture, leg length discrepancy, possible need for future surgery including the potential for amputation, blood clots, myocardial infarction, stroke, and death. Erica-operative blood management and the potential for blood transfusion were discussed with risks and options clearly outlined. Specific details of the surgical procedure, hospitalization, recovery, rehabilitation, and long-term precautions were also presented. Pre-operative teaching was provided. Implant/prosthesis selection was outlined,  and the many options available were explained; the final choice will be made at the time of the procedure to match the anatomy and condition of the bone, ligaments, tendons, and muscles. Given this instruction, the patient elected to proceed with the right total hip arthroplasty. The patient will be seen by pre-admission testing for pre-operative optimization and risk assessment and will be scheduled for surgery once this is completed.    The patient is considered standard risk for DVT based on patient risk factors and will be placed on aspirin postoperatively for DVT prophylaxis.      Davin Swenson MD  09/01/22  07:54 EDT

## 2022-09-01 NOTE — H&P (VIEW-ONLY)
Orthopaedic Clinic Note: Hip Established Patient    Chief Complaint   Patient presents with   • Follow-up     17 month recheck - Primary osteoarthritis of right hip and Avascular necrosis of bone of right hip             HPI    It has been 17  month(s) since 's last visit. He returns to clinic today for follow-up right hip osteoarthritis.  He comes going today complaining of bilateral hip pain that has gotten progressively worse over the past 2 years.  He is taking Celebrex with minimal improvement.  Rates his pain 8/10 on the pain scale.  Localizes the pain to the groin bilaterally.  Denies fevers chills or constitutional symptoms.  Overall he is doing worse.    Past Medical History:   Diagnosis Date   • Arthritis    • Hip arthrosis 01/2021   • History of esophageal stricture 2015   • History of gastric ulcer 2017   • History of medical problems Concussion and brain bleed   • History of transfusion 2017    4 units due to GI bleed    • Inguinal hernia    • Knee swelling 01/2022   • Peptic ulceration    • Post-traumatic brain syndrome 2018    r/t falling outside in winter. 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      Past Surgical History:   Procedure Laterality Date   • APPENDECTOMY  1999   • COLONOSCOPY  2015   • ESOPHAGEAL DILATATION  2015   • INGUINAL HERNIA REPAIR Bilateral 06/18/2020    Procedure: INGUINAL HERNIA REPAIR BILATERAL WITH MESH;  Surgeon: Johnathan Durant MD;  Location: [facility];  Service: General;  Laterality: Bilateral;   • INGUINAL HERNIA REPAIR     • TONSILLECTOMY     • TONSILLECTOMY AND ADENOIDECTOMY  1959   • UPPER GASTROINTESTINAL ENDOSCOPY  2015      Family History   Problem Relation Age of Onset   • Colon cancer Mother    • Cancer Mother         Colon Cancer at age 83   • Osteoporosis Mother    • Parkinsonism Father    • Heart disease Father    • Arthritis Father    • Hyperlipidemia Father    • Broken bones Father         Broken Leg   • Allergies Daughter    • Asthma Daughter    • No Known  Problems Son    • Other Maternal Grandmother         Unknown   • Heart block Maternal Grandfather    • Macular degeneration Paternal Grandmother    • Diverticulitis Paternal Grandfather    • Emphysema Paternal Grandfather    • Arthritis Brother      Social History     Socioeconomic History   • Marital status:      Spouse name: Shena   • Number of children: 2   • Years of education: College   Tobacco Use   • Smoking status: Never Smoker   • Smokeless tobacco: Never Used   Substance and Sexual Activity   • Alcohol use: No   • Drug use: No   • Sexual activity: Yes     Partners: Female     Birth control/protection: None     Comment:       Current Outpatient Medications on File Prior to Visit   Medication Sig Dispense Refill   • acetaminophen (TYLENOL) 650 MG 8 hr tablet Take 650 mg by mouth Every 8 (Eight) Hours As Needed for Mild Pain .     • amLODIPine (NORVASC) 5 MG tablet Take 1 tablet by mouth Daily. 30 tablet 3   • celecoxib (CeleBREX) 200 MG capsule Take 1 capsule by mouth 2 (Two) Times a Day As Needed.     • Multiple Vitamins-Minerals (MULTIVITAL PO) Take 1 dose by mouth Every Morning.     • Dextromethorphan-guaiFENesin (MUCUS RELIEF DM PO) Take 1,200 mg by mouth As Needed.       No current facility-administered medications on file prior to visit.      Allergies   Allergen Reactions   • Hctz [Hydrochlorothiazide] Myalgia     Muscle weakness   • Losartan Myalgia     Muscle weakness   • Lisinopril Cough   • Valsartan Cough        Review of Systems   Constitutional: Positive for activity change.   HENT: Negative.    Eyes: Negative.    Respiratory: Negative.    Cardiovascular: Negative.    Gastrointestinal: Negative.    Endocrine: Negative.    Genitourinary: Negative.    Musculoskeletal: Positive for arthralgias and gait problem.   Skin: Negative.    Allergic/Immunologic: Negative.    Hematological: Negative.    Psychiatric/Behavioral: Negative.         The patient's Review of Systems was personally  "reviewed and confirmed as accurate.    Physical Exam  Blood pressure 170/90, height 175.3 cm (69.02\"), weight 88 kg (194 lb).    Body mass index is 28.64 kg/m².    GENERAL APPEARANCE: awake, alert, oriented, in no acute distress and well developed, well nourished  LUNGS:  breathing nonlabored  EXTREMITIES: no clubbing, cyanosis  PERIPHERAL PULSES: palpable dorsalis pedis and posterior tibial pulses bilaterally.    GAIT:  Antalgic            Hip Exam:  Bilateral    RANGE OF MOTION:  EXTENSION/FLEXION:  normal (0-110 degrees)  IR (at 90 degrees of flexion):  neutral  ER (at 90 degrees of flexion):  20  PAIN WITH HIP MOTION:  yes, Localized to groin bilaterally  PAIN WITH LOGROLL:  no     STINCHFIELD TEST: positive    STRENGTH:  ABDUCTOR:  4/5  ADDUCTOR:  5/5  HIP FLEXION:  5/5    GREATER TROCHANTER BURSAL PAIN:  yes    SENSATION TO LIGHT TOUCH:  DEEP PERONEAL/SUPERFICIAL PERONEAL/SURAL/SAPHENOUS/TIBIAL:   intact    EDEMA:  no  ERYTHEMA:  no  WOUNDS/INCISIONS:   no  _________________________________________________________________  _________________________________________________________________    RADIOGRAPHIC FINDINGS:   Indication: Bilateral hip pain    Comparison: Todays xrays were compared to previous xrays from 8/23/2022    AP pelvis: Right: advanced, end-stage osteoarthritis with bone on bone articulation, subchondral sclerosis, and subchondral cysts, there are marginal osteophytes visualized at the femoral head-neck junction and acetabular margins and No significant changes compared to prior radiographs.;Left: advanced, end-stage osteoarthritis with bone on bone articulation, subchondral sclerosis, and subchondral cysts, there are marginal osteophytes visualized at the femoral head-neck junction and acetabular margins and No significant changes compared to prior radiographs.      Assessment/Plan:   Diagnosis Plan   1. Primary osteoarthritis of right hip  XR Pelvis 1 or 2 View    Case Request    CBC and " Differential    Comprehensive metabolic panel    Protime-INR    APTT    Hemoglobin A1c    ECG 12 Lead    Nicotine & Metabolite, Quant    Case Request   2. Avascular necrosis of bone of right hip (HCC)     3. Primary osteoarthritis of left hip       Patient suffering with end-stage osteoarthritis of the bilateral hips.  I discussed treatment options.  He has failed oral anti-inflammatory prescription.  Given the severity of his arthritis and limitations in hip motion, I do not believe further conservative treatment will be of benefit.  I recommend proceeding with staged total hip arthroplasty, starting with the right side as that is the most symptomatic.  He is agreeable to this plan.    The patient has clinical and radiographic evidence of end-stage right hip joint degeneration. Conservative measures have been tried for 3 months or longer, but have failed to adequately treat or improve the patient's symptoms. Pain is restricting the patient's daily activities as well as quality of life. The recommendation at this time is to proceed with a right total hip arthroplasty with the goal to improve patient function and pain. The risks, benefits, potential complications, and alternatives were discussed with the patient in detail. Risks included but were not limited to bleeding, infection, anesthesia risks, damage to neurovascular structures, osteolysis, aseptic loosening, instability, dislocation, pain, continued pain, iatrogenic fracture, leg length discrepancy, possible need for future surgery including the potential for amputation, blood clots, myocardial infarction, stroke, and death. Erica-operative blood management and the potential for blood transfusion were discussed with risks and options clearly outlined. Specific details of the surgical procedure, hospitalization, recovery, rehabilitation, and long-term precautions were also presented. Pre-operative teaching was provided. Implant/prosthesis selection was outlined,  and the many options available were explained; the final choice will be made at the time of the procedure to match the anatomy and condition of the bone, ligaments, tendons, and muscles. Given this instruction, the patient elected to proceed with the right total hip arthroplasty. The patient will be seen by pre-admission testing for pre-operative optimization and risk assessment and will be scheduled for surgery once this is completed.    The patient is considered standard risk for DVT based on patient risk factors and will be placed on aspirin postoperatively for DVT prophylaxis.      Davin Swenson MD  09/01/22  07:54 EDT

## 2022-09-02 ENCOUNTER — PRE-ADMISSION TESTING (OUTPATIENT)
Dept: PREADMISSION TESTING | Facility: HOSPITAL | Age: 67
End: 2022-09-02

## 2022-09-02 VITALS — BODY MASS INDEX: 29.14 KG/M2 | WEIGHT: 196.76 LBS | HEIGHT: 69 IN

## 2022-09-02 DIAGNOSIS — M16.11 PRIMARY OSTEOARTHRITIS OF RIGHT HIP: ICD-10-CM

## 2022-09-02 LAB
ALBUMIN SERPL-MCNC: 4.3 G/DL (ref 3.5–5.2)
ALBUMIN/GLOB SERPL: 1.4 G/DL
ALP SERPL-CCNC: 84 U/L (ref 39–117)
ALT SERPL W P-5'-P-CCNC: 18 U/L (ref 1–41)
ANION GAP SERPL CALCULATED.3IONS-SCNC: 11 MMOL/L (ref 5–15)
APTT PPP: 28 SECONDS (ref 22–39)
AST SERPL-CCNC: 18 U/L (ref 1–40)
BASOPHILS # BLD AUTO: 0.06 10*3/MM3 (ref 0–0.2)
BASOPHILS NFR BLD AUTO: 0.6 % (ref 0–1.5)
BILIRUB SERPL-MCNC: 0.4 MG/DL (ref 0–1.2)
BUN SERPL-MCNC: 22 MG/DL (ref 8–23)
BUN/CREAT SERPL: 18.6 (ref 7–25)
CALCIUM SPEC-SCNC: 9.8 MG/DL (ref 8.6–10.5)
CHLORIDE SERPL-SCNC: 104 MMOL/L (ref 98–107)
CO2 SERPL-SCNC: 24 MMOL/L (ref 22–29)
CREAT SERPL-MCNC: 1.18 MG/DL (ref 0.76–1.27)
DEPRECATED RDW RBC AUTO: 41.1 FL (ref 37–54)
EGFRCR SERPLBLD CKD-EPI 2021: 67.2 ML/MIN/1.73
EOSINOPHIL # BLD AUTO: 0.64 10*3/MM3 (ref 0–0.4)
EOSINOPHIL NFR BLD AUTO: 6.4 % (ref 0.3–6.2)
ERYTHROCYTE [DISTWIDTH] IN BLOOD BY AUTOMATED COUNT: 11.9 % (ref 12.3–15.4)
GLOBULIN UR ELPH-MCNC: 3 GM/DL
GLUCOSE SERPL-MCNC: 95 MG/DL (ref 65–99)
HBA1C MFR BLD: 5.1 % (ref 4.8–5.6)
HCT VFR BLD AUTO: 41 % (ref 37.5–51)
HGB BLD-MCNC: 14.6 G/DL (ref 13–17.7)
IMM GRANULOCYTES # BLD AUTO: 0.02 10*3/MM3 (ref 0–0.05)
IMM GRANULOCYTES NFR BLD AUTO: 0.2 % (ref 0–0.5)
INR PPP: 1 (ref 0.84–1.13)
LYMPHOCYTES # BLD AUTO: 2.31 10*3/MM3 (ref 0.7–3.1)
LYMPHOCYTES NFR BLD AUTO: 23.2 % (ref 19.6–45.3)
MCH RBC QN AUTO: 33.5 PG (ref 26.6–33)
MCHC RBC AUTO-ENTMCNC: 35.6 G/DL (ref 31.5–35.7)
MCV RBC AUTO: 94 FL (ref 79–97)
MONOCYTES # BLD AUTO: 1.17 10*3/MM3 (ref 0.1–0.9)
MONOCYTES NFR BLD AUTO: 11.8 % (ref 5–12)
NEUTROPHILS NFR BLD AUTO: 5.74 10*3/MM3 (ref 1.7–7)
NEUTROPHILS NFR BLD AUTO: 57.8 % (ref 42.7–76)
NRBC BLD AUTO-RTO: 0 /100 WBC (ref 0–0.2)
PLATELET # BLD AUTO: 199 10*3/MM3 (ref 140–450)
PMV BLD AUTO: 9 FL (ref 6–12)
POTASSIUM SERPL-SCNC: 4.4 MMOL/L (ref 3.5–5.2)
PROT SERPL-MCNC: 7.3 G/DL (ref 6–8.5)
PROTHROMBIN TIME: 13.1 SECONDS (ref 11.4–14.4)
RBC # BLD AUTO: 4.36 10*6/MM3 (ref 4.14–5.8)
SODIUM SERPL-SCNC: 139 MMOL/L (ref 136–145)
WBC NRBC COR # BLD: 9.94 10*3/MM3 (ref 3.4–10.8)

## 2022-09-02 PROCEDURE — 93005 ELECTROCARDIOGRAM TRACING: CPT

## 2022-09-02 PROCEDURE — 93010 ELECTROCARDIOGRAM REPORT: CPT | Performed by: INTERNAL MEDICINE

## 2022-09-02 PROCEDURE — 85730 THROMBOPLASTIN TIME PARTIAL: CPT

## 2022-09-02 PROCEDURE — G0480 DRUG TEST DEF 1-7 CLASSES: HCPCS

## 2022-09-02 PROCEDURE — 36415 COLL VENOUS BLD VENIPUNCTURE: CPT

## 2022-09-02 PROCEDURE — 80053 COMPREHEN METABOLIC PANEL: CPT

## 2022-09-02 PROCEDURE — 85610 PROTHROMBIN TIME: CPT

## 2022-09-02 PROCEDURE — 83036 HEMOGLOBIN GLYCOSYLATED A1C: CPT

## 2022-09-02 PROCEDURE — 85025 COMPLETE CBC W/AUTO DIFF WBC: CPT

## 2022-09-02 RX ORDER — MULTIPLE VITAMINS W/ MINERALS TAB 9MG-400MCG
1 TAB ORAL DAILY
COMMUNITY
End: 2022-10-04

## 2022-09-02 RX ORDER — BACLOFEN 20 MG
1 TABLET ORAL DAILY
COMMUNITY
End: 2022-12-08

## 2022-09-02 ASSESSMENT — HOOS JR
HOOS JR SCORE: 44.905
HOOS JR SCORE: 17

## 2022-09-02 NOTE — PAT
Prescription for Bactroban (if prescribed) and Chlorhexidine shower called into patient's pharmacy or [facility] pharmacy by patient's surgeon.  Reinforced with patient to  the prescription from applicable pharmacy if they haven't already.  Verbal and written instructions given regarding proper use of the Bactroban (if prescribed) and Chlorhexidine to patient and/or famlily during PAT visit. Patient/family also instructed to complete checklist and return it to Pre-op on the day of surgery.  Patient and/or family verbalized understanding.    Patient instructed to drink 20 ounces of Gatorade and it needs to be completed 1 hour (for Main OR patients) or 2 hours (scheduled  section & [facility] patients) before given arrival time for procedure (NO RED Gatorade)    Patient verbalized understanding.    Patient to apply Chlorhexadine wipes  to surgical area (as instructed) the night before procedure and the AM of procedure. Wipes provided.    Clean catch urinalysis not indicated because patient denied recent urinary frequency, urinary urgency, burning/pain upon urination, or flank pain. No recent UTIs.    Discussed with patient options for receiving total joint replacement education and assessed patient's ability and preference. Joint Replacement Guide given to patient during PAT visit since not received a copy within the last year. Encouraged patient/family to read guide thoroughly and notify PAT staff with any questions or concerns. Handout provided directing patient to links to watch online videos related to joint replacement surgery on the [facility] website. The handout gives detailed instructions for joining an online joint replacement class through Zoom or phone conference offered on . Patient agreed to participate by watching videos online. Patient verbalized understanding of instructions and to complete the online learning tool survey. Encouraged to share information with family and/or . An  overview of the joint replacement education was provided during the visit including general perioperative instructions that are routine for all surgical patients (PAT PASS, wipes, directions to pre-op, etc.).    Consent signed.

## 2022-09-03 LAB
QT INTERVAL: 344 MS
QTC INTERVAL: 399 MS

## 2022-09-08 LAB
COTININE SERPL-MCNC: <1 NG/ML
NICOTINE SERPL-MCNC: <1 NG/ML

## 2022-09-12 ENCOUNTER — ANESTHESIA (OUTPATIENT)
Dept: PERIOP | Facility: HOSPITAL | Age: 67
End: 2022-09-12

## 2022-09-12 ENCOUNTER — HOSPITAL ENCOUNTER (OUTPATIENT)
Facility: HOSPITAL | Age: 67
Discharge: HOME | End: 2022-09-12
Attending: ORTHOPAEDIC SURGERY | Admitting: ORTHOPAEDIC SURGERY

## 2022-09-12 ENCOUNTER — APPOINTMENT (OUTPATIENT)
Dept: GENERAL RADIOLOGY | Facility: HOSPITAL | Age: 67
End: 2022-09-12

## 2022-09-12 ENCOUNTER — ANESTHESIA EVENT CONVERTED (OUTPATIENT)
Dept: ANESTHESIOLOGY | Facility: HOSPITAL | Age: 67
End: 2022-09-12

## 2022-09-12 ENCOUNTER — READMISSION MANAGEMENT (OUTPATIENT)
Dept: CALL CENTER | Facility: HOSPITAL | Age: 67
End: 2022-09-12

## 2022-09-12 ENCOUNTER — ANESTHESIA EVENT (OUTPATIENT)
Dept: PERIOP | Facility: HOSPITAL | Age: 67
End: 2022-09-12

## 2022-09-12 VITALS
WEIGHT: 196 LBS | OXYGEN SATURATION: 96 % | DIASTOLIC BLOOD PRESSURE: 77 MMHG | TEMPERATURE: 96 F | SYSTOLIC BLOOD PRESSURE: 120 MMHG | HEART RATE: 94 BPM | RESPIRATION RATE: 18 BRPM | BODY MASS INDEX: 29.03 KG/M2 | HEIGHT: 69 IN

## 2022-09-12 DIAGNOSIS — Z96.641 STATUS POST TOTAL REPLACEMENT OF RIGHT HIP: Primary | ICD-10-CM

## 2022-09-12 DIAGNOSIS — M16.11 PRIMARY OSTEOARTHRITIS OF RIGHT HIP: ICD-10-CM

## 2022-09-12 LAB — GLUCOSE BLDC GLUCOMTR-MCNC: 78 MG/DL (ref 70–130)

## 2022-09-12 PROCEDURE — 25010000002 FENTANYL CITRATE (PF) 50 MCG/ML SOLUTION: Performed by: NURSE ANESTHETIST, CERTIFIED REGISTERED

## 2022-09-12 PROCEDURE — 25010000002 ROPIVACAINE PER 1 MG: Performed by: ORTHOPAEDIC SURGERY

## 2022-09-12 PROCEDURE — 25010000002 DEXAMETHASONE PER 1 MG: Performed by: NURSE ANESTHETIST, CERTIFIED REGISTERED

## 2022-09-12 PROCEDURE — 25010000002 HYDROMORPHONE PER 4 MG: Performed by: NURSE ANESTHETIST, CERTIFIED REGISTERED

## 2022-09-12 PROCEDURE — 25010000002 ONDANSETRON PER 1 MG: Performed by: NURSE ANESTHETIST, CERTIFIED REGISTERED

## 2022-09-12 PROCEDURE — 27130 TOTAL HIP ARTHROPLASTY: CPT | Performed by: PHYSICIAN ASSISTANT

## 2022-09-12 PROCEDURE — 25010000002 MORPHINE PER 10 MG: Performed by: ORTHOPAEDIC SURGERY

## 2022-09-12 PROCEDURE — 25010000002 KETOROLAC TROMETHAMINE PER 15 MG: Performed by: ORTHOPAEDIC SURGERY

## 2022-09-12 PROCEDURE — 27130 TOTAL HIP ARTHROPLASTY: CPT | Performed by: ORTHOPAEDIC SURGERY

## 2022-09-12 PROCEDURE — 97116 GAIT TRAINING THERAPY: CPT

## 2022-09-12 PROCEDURE — G0378 HOSPITAL OBSERVATION PER HR: HCPCS

## 2022-09-12 PROCEDURE — 25010000002 PHENYLEPHRINE 10 MG/ML SOLUTION: Performed by: NURSE ANESTHETIST, CERTIFIED REGISTERED

## 2022-09-12 PROCEDURE — C1713 ANCHOR/SCREW BN/BN,TIS/BN: HCPCS | Performed by: ORTHOPAEDIC SURGERY

## 2022-09-12 PROCEDURE — 82962 GLUCOSE BLOOD TEST: CPT

## 2022-09-12 PROCEDURE — C1776 JOINT DEVICE (IMPLANTABLE): HCPCS | Performed by: ORTHOPAEDIC SURGERY

## 2022-09-12 PROCEDURE — 72170 X-RAY EXAM OF PELVIS: CPT

## 2022-09-12 PROCEDURE — 97110 THERAPEUTIC EXERCISES: CPT

## 2022-09-12 PROCEDURE — 25010000002 CEFAZOLIN IN DEXTROSE 2-4 GM/100ML-% SOLUTION: Performed by: ORTHOPAEDIC SURGERY

## 2022-09-12 PROCEDURE — 97162 PT EVAL MOD COMPLEX 30 MIN: CPT

## 2022-09-12 PROCEDURE — 25010000002 PROPOFOL 10 MG/ML EMULSION: Performed by: NURSE ANESTHETIST, CERTIFIED REGISTERED

## 2022-09-12 PROCEDURE — 25010000002 FENTANYL CITRATE (PF) 50 MCG/ML SOLUTION

## 2022-09-12 DEVICE — DEV CONTRL TISS STRATAFIX SPIRAL PDO BIDIR 1 36X36CM: Type: IMPLANTABLE DEVICE | Site: HIP | Status: FUNCTIONAL

## 2022-09-12 DEVICE — CERAMIC V40 FEMORAL HEAD
Type: IMPLANTABLE DEVICE | Site: HIP | Status: FUNCTIONAL
Brand: BIOLOX

## 2022-09-12 DEVICE — TRIDENT II TRITANIUM CLUSTER 52E
Type: IMPLANTABLE DEVICE | Site: HIP | Status: FUNCTIONAL
Brand: TRIDENT II

## 2022-09-12 DEVICE — 127 DEGREE NECK ANGLE HIP STEM
Type: IMPLANTABLE DEVICE | Site: HIP | Status: FUNCTIONAL
Brand: ACCOLADE

## 2022-09-12 DEVICE — TRIDENT X3 0 DEGREE POLYETHYLENE INSERT
Type: IMPLANTABLE DEVICE | Site: HIP | Status: FUNCTIONAL
Brand: TRIDENT X3 INSERT

## 2022-09-12 DEVICE — TOTL HIP HI DEMAND STRYKER: Type: IMPLANTABLE DEVICE | Site: HIP | Status: FUNCTIONAL

## 2022-09-12 DEVICE — DEV CONTRL TISS STRATAFIX SPIRAL PDO BIDIR MO4 36X36CM: Type: IMPLANTABLE DEVICE | Site: HIP | Status: FUNCTIONAL

## 2022-09-12 DEVICE — WAX BONE HEMO AESCULAP 2.5GM: Type: IMPLANTABLE DEVICE | Site: HIP | Status: FUNCTIONAL

## 2022-09-12 DEVICE — 6.5MM LOW PROFILE HEX SCREW 35MM
Type: IMPLANTABLE DEVICE | Site: HIP | Status: FUNCTIONAL
Brand: TRIDENT II

## 2022-09-12 RX ORDER — ONDANSETRON 2 MG/ML
INJECTION INTRAMUSCULAR; INTRAVENOUS AS NEEDED
Status: DISCONTINUED | OUTPATIENT
Start: 2022-09-12 | End: 2022-09-12 | Stop reason: SURG

## 2022-09-12 RX ORDER — CEFAZOLIN SODIUM 2 G/100ML
2 INJECTION, SOLUTION INTRAVENOUS ONCE
Status: COMPLETED | OUTPATIENT
Start: 2022-09-12 | End: 2022-09-12

## 2022-09-12 RX ORDER — MELOXICAM 15 MG/1
15 TABLET ORAL ONCE
Status: COMPLETED | OUTPATIENT
Start: 2022-09-12 | End: 2022-09-12

## 2022-09-12 RX ORDER — FENTANYL CITRATE 50 UG/ML
INJECTION, SOLUTION INTRAMUSCULAR; INTRAVENOUS
Status: COMPLETED
Start: 2022-09-12 | End: 2022-09-12

## 2022-09-12 RX ORDER — ONDANSETRON 4 MG/1
4 TABLET, FILM COATED ORAL EVERY 6 HOURS PRN
Status: DISCONTINUED | OUTPATIENT
Start: 2022-09-12 | End: 2022-09-12 | Stop reason: HOSPADM

## 2022-09-12 RX ORDER — SODIUM CHLORIDE 9 MG/ML
100 INJECTION, SOLUTION INTRAVENOUS CONTINUOUS
Status: DISCONTINUED | OUTPATIENT
Start: 2022-09-12 | End: 2022-09-12 | Stop reason: HOSPADM

## 2022-09-12 RX ORDER — OXYCODONE HYDROCHLORIDE 5 MG/1
10 TABLET ORAL EVERY 4 HOURS PRN
Status: DISCONTINUED | OUTPATIENT
Start: 2022-09-12 | End: 2022-09-12 | Stop reason: HOSPADM

## 2022-09-12 RX ORDER — ASPIRIN 81 MG/1
81 TABLET ORAL 2 TIMES DAILY
Qty: 60 TABLET | Refills: 0 | Status: SHIPPED | OUTPATIENT
Start: 2022-09-13 | End: 2022-10-17 | Stop reason: HOSPADM

## 2022-09-12 RX ORDER — BUPIVACAINE HYDROCHLORIDE 5 MG/ML
INJECTION, SOLUTION PERINEURAL
Status: COMPLETED | OUTPATIENT
Start: 2022-09-12 | End: 2022-09-12

## 2022-09-12 RX ORDER — ACETAMINOPHEN 500 MG
1000 TABLET ORAL EVERY 8 HOURS
Status: DISCONTINUED | OUTPATIENT
Start: 2022-09-12 | End: 2022-09-12 | Stop reason: HOSPADM

## 2022-09-12 RX ORDER — FENTANYL CITRATE 50 UG/ML
50 INJECTION, SOLUTION INTRAMUSCULAR; INTRAVENOUS
Status: DISCONTINUED | OUTPATIENT
Start: 2022-09-12 | End: 2022-09-12 | Stop reason: HOSPADM

## 2022-09-12 RX ORDER — OXYCODONE HYDROCHLORIDE 5 MG/1
5 TABLET ORAL EVERY 4 HOURS PRN
Qty: 40 TABLET | Refills: 0 | Status: SHIPPED | OUTPATIENT
Start: 2022-09-12 | End: 2022-10-17 | Stop reason: HOSPADM

## 2022-09-12 RX ORDER — ONDANSETRON 2 MG/ML
4 INJECTION INTRAMUSCULAR; INTRAVENOUS EVERY 6 HOURS PRN
Status: DISCONTINUED | OUTPATIENT
Start: 2022-09-12 | End: 2022-09-12 | Stop reason: HOSPADM

## 2022-09-12 RX ORDER — NALOXONE HCL 0.4 MG/ML
0.1 VIAL (ML) INJECTION
Status: DISCONTINUED | OUTPATIENT
Start: 2022-09-12 | End: 2022-09-12 | Stop reason: HOSPADM

## 2022-09-12 RX ORDER — POLYETHYLENE GLYCOL 3350 17 G/17G
17 POWDER, FOR SOLUTION ORAL DAILY
Qty: 15 PACKET | Refills: 0 | Status: SHIPPED | OUTPATIENT
Start: 2022-09-12 | End: 2022-09-27

## 2022-09-12 RX ORDER — NALOXONE HCL 0.4 MG/ML
0.4 VIAL (ML) INJECTION AS NEEDED
Status: DISCONTINUED | OUTPATIENT
Start: 2022-09-12 | End: 2022-09-12 | Stop reason: HOSPADM

## 2022-09-12 RX ORDER — TRANEXAMIC ACID 10 MG/ML
1000 INJECTION, SOLUTION INTRAVENOUS ONCE
Status: COMPLETED | OUTPATIENT
Start: 2022-09-12 | End: 2022-09-12

## 2022-09-12 RX ORDER — SODIUM CHLORIDE, SODIUM LACTATE, POTASSIUM CHLORIDE, CALCIUM CHLORIDE 600; 310; 30; 20 MG/100ML; MG/100ML; MG/100ML; MG/100ML
100 INJECTION, SOLUTION INTRAVENOUS CONTINUOUS
Status: DISCONTINUED | OUTPATIENT
Start: 2022-09-12 | End: 2022-09-12 | Stop reason: HOSPADM

## 2022-09-12 RX ORDER — PREGABALIN 75 MG/1
75 CAPSULE ORAL ONCE
Status: COMPLETED | OUTPATIENT
Start: 2022-09-12 | End: 2022-09-12

## 2022-09-12 RX ORDER — MIDAZOLAM HYDROCHLORIDE 1 MG/ML
0.5 INJECTION INTRAMUSCULAR; INTRAVENOUS
Status: DISCONTINUED | OUTPATIENT
Start: 2022-09-12 | End: 2022-09-12 | Stop reason: HOSPADM

## 2022-09-12 RX ORDER — FAMOTIDINE 20 MG/1
20 TABLET, FILM COATED ORAL
Status: COMPLETED | OUTPATIENT
Start: 2022-09-12 | End: 2022-09-12

## 2022-09-12 RX ORDER — OXYCODONE HYDROCHLORIDE 5 MG/1
5 TABLET ORAL EVERY 4 HOURS PRN
Status: DISCONTINUED | OUTPATIENT
Start: 2022-09-12 | End: 2022-09-12 | Stop reason: HOSPADM

## 2022-09-12 RX ORDER — PHENYLEPHRINE HYDROCHLORIDE 10 MG/ML
INJECTION INTRAVENOUS AS NEEDED
Status: DISCONTINUED | OUTPATIENT
Start: 2022-09-12 | End: 2022-09-12 | Stop reason: SURG

## 2022-09-12 RX ORDER — PROPOFOL 10 MG/ML
VIAL (ML) INTRAVENOUS AS NEEDED
Status: DISCONTINUED | OUTPATIENT
Start: 2022-09-12 | End: 2022-09-12 | Stop reason: SURG

## 2022-09-12 RX ORDER — EPHEDRINE SULFATE 50 MG/ML
5 INJECTION, SOLUTION INTRAVENOUS ONCE AS NEEDED
Status: DISCONTINUED | OUTPATIENT
Start: 2022-09-12 | End: 2022-09-12 | Stop reason: HOSPADM

## 2022-09-12 RX ORDER — MELOXICAM 7.5 MG/1
15 TABLET ORAL DAILY
Status: DISCONTINUED | OUTPATIENT
Start: 2022-09-12 | End: 2022-09-12 | Stop reason: HOSPADM

## 2022-09-12 RX ORDER — MEPERIDINE HYDROCHLORIDE 25 MG/ML
12.5 INJECTION INTRAMUSCULAR; INTRAVENOUS; SUBCUTANEOUS
Status: DISCONTINUED | OUTPATIENT
Start: 2022-09-12 | End: 2022-09-12 | Stop reason: HOSPADM

## 2022-09-12 RX ORDER — LIDOCAINE HYDROCHLORIDE 10 MG/ML
INJECTION, SOLUTION EPIDURAL; INFILTRATION; INTRACAUDAL; PERINEURAL AS NEEDED
Status: DISCONTINUED | OUTPATIENT
Start: 2022-09-12 | End: 2022-09-12 | Stop reason: SURG

## 2022-09-12 RX ORDER — LABETALOL HYDROCHLORIDE 5 MG/ML
10 INJECTION, SOLUTION INTRAVENOUS EVERY 4 HOURS PRN
Status: DISCONTINUED | OUTPATIENT
Start: 2022-09-12 | End: 2022-09-12 | Stop reason: HOSPADM

## 2022-09-12 RX ORDER — MAGNESIUM HYDROXIDE 1200 MG/15ML
LIQUID ORAL AS NEEDED
Status: DISCONTINUED | OUTPATIENT
Start: 2022-09-12 | End: 2022-09-12 | Stop reason: HOSPADM

## 2022-09-12 RX ORDER — LIDOCAINE HYDROCHLORIDE 10 MG/ML
0.5 INJECTION, SOLUTION EPIDURAL; INFILTRATION; INTRACAUDAL; PERINEURAL ONCE AS NEEDED
Status: COMPLETED | OUTPATIENT
Start: 2022-09-12 | End: 2022-09-12

## 2022-09-12 RX ORDER — DEXAMETHASONE SODIUM PHOSPHATE 10 MG/ML
INJECTION INTRAMUSCULAR; INTRAVENOUS AS NEEDED
Status: DISCONTINUED | OUTPATIENT
Start: 2022-09-12 | End: 2022-09-12 | Stop reason: SURG

## 2022-09-12 RX ORDER — SODIUM CHLORIDE, SODIUM LACTATE, POTASSIUM CHLORIDE, CALCIUM CHLORIDE 600; 310; 30; 20 MG/100ML; MG/100ML; MG/100ML; MG/100ML
9 INJECTION, SOLUTION INTRAVENOUS CONTINUOUS PRN
Status: DISCONTINUED | OUTPATIENT
Start: 2022-09-12 | End: 2022-09-12 | Stop reason: HOSPADM

## 2022-09-12 RX ORDER — HYDROMORPHONE HYDROCHLORIDE 1 MG/ML
0.5 INJECTION, SOLUTION INTRAMUSCULAR; INTRAVENOUS; SUBCUTANEOUS
Status: DISCONTINUED | OUTPATIENT
Start: 2022-09-12 | End: 2022-09-12 | Stop reason: HOSPADM

## 2022-09-12 RX ORDER — TRANEXAMIC ACID 10 MG/ML
1000 INJECTION, SOLUTION INTRAVENOUS ONCE
Status: DISCONTINUED | OUTPATIENT
Start: 2022-09-12 | End: 2022-09-12 | Stop reason: HOSPADM

## 2022-09-12 RX ORDER — ACETAMINOPHEN 500 MG
1000 TABLET ORAL ONCE
Status: COMPLETED | OUTPATIENT
Start: 2022-09-12 | End: 2022-09-12

## 2022-09-12 RX ORDER — ONDANSETRON 2 MG/ML
4 INJECTION INTRAMUSCULAR; INTRAVENOUS ONCE AS NEEDED
Status: DISCONTINUED | OUTPATIENT
Start: 2022-09-12 | End: 2022-09-12 | Stop reason: HOSPADM

## 2022-09-12 RX ORDER — ACETAMINOPHEN 500 MG
1000 TABLET ORAL EVERY 8 HOURS
Qty: 42 TABLET | Refills: 0
Start: 2022-09-12 | End: 2022-09-19

## 2022-09-12 RX ORDER — CEFAZOLIN SODIUM 2 G/100ML
2 INJECTION, SOLUTION INTRAVENOUS EVERY 8 HOURS
Status: DISCONTINUED | OUTPATIENT
Start: 2022-09-12 | End: 2022-09-12 | Stop reason: HOSPADM

## 2022-09-12 RX ORDER — ASPIRIN 81 MG/1
81 TABLET ORAL EVERY 12 HOURS SCHEDULED
Status: DISCONTINUED | OUTPATIENT
Start: 2022-09-13 | End: 2022-09-12 | Stop reason: HOSPADM

## 2022-09-12 RX ORDER — SODIUM CHLORIDE 0.9 % (FLUSH) 0.9 %
10 SYRINGE (ML) INJECTION EVERY 12 HOURS SCHEDULED
Status: DISCONTINUED | OUTPATIENT
Start: 2022-09-12 | End: 2022-09-12 | Stop reason: HOSPADM

## 2022-09-12 RX ORDER — SODIUM CHLORIDE 0.9 % (FLUSH) 0.9 %
10 SYRINGE (ML) INJECTION AS NEEDED
Status: DISCONTINUED | OUTPATIENT
Start: 2022-09-12 | End: 2022-09-12 | Stop reason: HOSPADM

## 2022-09-12 RX ADMIN — FENTANYL CITRATE 50 MCG: 50 INJECTION, SOLUTION INTRAMUSCULAR; INTRAVENOUS at 12:45

## 2022-09-12 RX ADMIN — LIDOCAINE HYDROCHLORIDE 50 MG: 10 INJECTION, SOLUTION EPIDURAL; INFILTRATION; INTRACAUDAL; PERINEURAL at 10:01

## 2022-09-12 RX ADMIN — PROPOFOL 75 MCG/KG/MIN: 10 INJECTION, EMULSION INTRAVENOUS at 10:07

## 2022-09-12 RX ADMIN — SODIUM CHLORIDE, POTASSIUM CHLORIDE, SODIUM LACTATE AND CALCIUM CHLORIDE 9 ML/HR: 600; 310; 30; 20 INJECTION, SOLUTION INTRAVENOUS at 08:31

## 2022-09-12 RX ADMIN — TRANEXAMIC ACID 1000 MG: 10 INJECTION, SOLUTION INTRAVENOUS at 11:28

## 2022-09-12 RX ADMIN — ACETAMINOPHEN 1000 MG: 500 TABLET, FILM COATED ORAL at 08:32

## 2022-09-12 RX ADMIN — PHENYLEPHRINE HYDROCHLORIDE 100 MCG: 10 INJECTION INTRAVENOUS at 11:36

## 2022-09-12 RX ADMIN — ONDANSETRON 4 MG: 2 INJECTION INTRAMUSCULAR; INTRAVENOUS at 10:12

## 2022-09-12 RX ADMIN — DEXAMETHASONE SODIUM PHOSPHATE 8 MG: 10 INJECTION INTRAMUSCULAR; INTRAVENOUS at 10:12

## 2022-09-12 RX ADMIN — MUPIROCIN 1 APPLICATION: 20 OINTMENT TOPICAL at 08:32

## 2022-09-12 RX ADMIN — CEFAZOLIN SODIUM 2 G: 2 INJECTION, SOLUTION INTRAVENOUS at 10:07

## 2022-09-12 RX ADMIN — BUPIVACAINE HYDROCHLORIDE 2 ML: 5 INJECTION, SOLUTION PERINEURAL at 10:05

## 2022-09-12 RX ADMIN — TRANEXAMIC ACID 1000 MG: 10 INJECTION, SOLUTION INTRAVENOUS at 10:12

## 2022-09-12 RX ADMIN — ACETAMINOPHEN 1000 MG: 500 TABLET, FILM COATED ORAL at 15:29

## 2022-09-12 RX ADMIN — FENTANYL CITRATE 50 MCG: 50 INJECTION, SOLUTION INTRAMUSCULAR; INTRAVENOUS at 13:09

## 2022-09-12 RX ADMIN — PHENYLEPHRINE HYDROCHLORIDE 100 MCG: 10 INJECTION INTRAVENOUS at 11:50

## 2022-09-12 RX ADMIN — MELOXICAM 15 MG: 15 TABLET ORAL at 08:32

## 2022-09-12 RX ADMIN — PREGABALIN 75 MG: 75 CAPSULE ORAL at 08:32

## 2022-09-12 RX ADMIN — PROPOFOL 50 MG: 10 INJECTION, EMULSION INTRAVENOUS at 10:07

## 2022-09-12 RX ADMIN — SODIUM CHLORIDE, POTASSIUM CHLORIDE, SODIUM LACTATE AND CALCIUM CHLORIDE: 600; 310; 30; 20 INJECTION, SOLUTION INTRAVENOUS at 11:09

## 2022-09-12 RX ADMIN — CEFAZOLIN SODIUM 2 G: 2 INJECTION, SOLUTION INTRAVENOUS at 15:29

## 2022-09-12 RX ADMIN — LIDOCAINE HYDROCHLORIDE 0.5 ML: 10 INJECTION, SOLUTION EPIDURAL; INFILTRATION; INTRACAUDAL; PERINEURAL at 08:31

## 2022-09-12 RX ADMIN — PROPOFOL 50 MG: 10 INJECTION, EMULSION INTRAVENOUS at 10:01

## 2022-09-12 RX ADMIN — HYDROMORPHONE HYDROCHLORIDE 0.5 MG: 1 INJECTION, SOLUTION INTRAMUSCULAR; INTRAVENOUS; SUBCUTANEOUS at 12:20

## 2022-09-12 RX ADMIN — FAMOTIDINE 20 MG: 20 TABLET ORAL at 08:32

## 2022-09-12 NOTE — ANESTHESIA PROCEDURE NOTES
Peripheral Block      Patient reassessed immediately prior to procedure    Patient location during procedure: post-op  Reason for block: at surgeon's request and post-op pain management  Preanesthetic Checklist  Completed: patient identified, IV checked, site marked, risks and benefits discussed, surgical consent, monitors and equipment checked, pre-op evaluation and timeout performed  Prep:  Pt Position: supine  Sterile barriers:cap, gloves, mask and sterile barriers  Prep: ChloraPrep  Patient monitoring: blood pressure monitoring, continuous pulse oximetry and EKG  Procedure  Performed under: spinal  Guidance:ultrasound guided  Images:still images obtained, printed/placed on chart    Block Type:adductor canal block  Injection Technique:catheter  Needle Type:Tuohy and echogenic  Needle Gauge:18 G  Resistance on Injection: none  Catheter Size:20 G (20g)          Post Assessment  Injection Assessment: negative aspiration for heme, incremental injection and no paresthesia on injection  Patient Tolerance:comfortable throughout block  Complications:no  Additional Notes  Procedure:             The pt was placed in the Supine position.  The Insertion site was  prepped and Draped in sterile fashion.  The pt was anesthetized with  IV Sedation( see meds).  Skin and cutaneous tissue was infiltrated and anesthetized with 1% Lidocaine 3 mls via a 25g needle.  A BBraun 4 inch 18g echogenic needle was then  inserted approximately midline, mid-thigh and advanced In-plane with Ultrasound guidance.  Normal Saline PSF was utilized for hydrodissection of tissue.  The Vastus medialis and Sartorius muscle where visualized and the needle tip was placed in the adductor canal,  lateral to the femoral artery.  LA injection spread was visualized, injection was incremental 1-5ml, injection pressure was normal or little, no intraneural injection, no vascular injection.  LA dose was injected thru the needle(see dose above).  A BBraun 20g wire  stylet catheter was placed via the needle with ultrasound visualization and confirmation with NS fluid bolus. The catheter insertion site was sealed with exofin tissue adhesive. The labeled catheter was then coiled and secured to skin with benzoin,  steristrips and CHG transparent dressing.  Appropriate labels were applied.  Thank you.

## 2022-09-12 NOTE — BRIEF OP NOTE
TOTAL HIP ARTHROPLASTY  Progress Note    Kt Davis  9/12/2022    Pre-op Diagnosis:   Primary osteoarthritis of right hip [M16.11]       Post-Op Diagnosis Codes:     * Primary osteoarthritis of right hip [M16.11]    Procedure/CPT® Codes:  HI TOTAL HIP ARTHROPLASTY [23182]      Procedure(s):  RIGHT TOTAL HIP ARTHROPLASTY    Surgeon(s):  Davin Swenson MD    Anesthesia: Spinal    Staff:   Circulator: Tennille Engel, MUNDO; Chel Beal RN  Scrub Person: Madelyn Puga; Chhaya Barton  Vendor Representative: Yun De Dios Brad  Nursing Assistant: Jhonatan Garcia PCT  Assistant: Nanci Berger PA-C  Assistant: Nanci Berger PA-C      Estimated Blood Loss: 250 mL    Urine Voided: * No values recorded between 9/12/2022  9:58 AM and 9/12/2022 11:37 AM *    Specimens:                None          Drains: * No LDAs found *    Findings: End-stage osteoarthritis right hip        Complications: None apparent    Assistant: Nanci Berger PA-C  was responsible for performing the following activities: Retraction, Suction, Irrigation, Suturing, Closing and Placing Dressing and their skilled assistance was necessary for the success of this case.    Davin Swenson MD     Date: 9/12/2022  Time: 11:59 EDT

## 2022-09-12 NOTE — ANESTHESIA POSTPROCEDURE EVALUATION
Patient: Kt Davis    Procedure Summary     Date: 09/12/22 Room / Location:  [facility]    Anesthesia Start: 0959 Anesthesia Stop: 1219    Procedure: RIGHT TOTAL HIP ARTHROPLASTY (Right Hip) Diagnosis:       Primary osteoarthritis of right hip      (Primary osteoarthritis of right hip [M16.11])    Surgeons: Davin Swenson MD Provider: Yang Ledezma MD    Anesthesia Type: spinal ASA Status: 3          Anesthesia Type: spinal    Vitals  Vitals Value Taken Time   BP 97/63 09/12/22 1219   Temp 97 °F (36.1 °C) 09/12/22 1219   Pulse 63 09/12/22 1219   Resp     SpO2 97 % 09/12/22 1219           Post Anesthesia Care and Evaluation    Patient location during evaluation: PACU  Patient participation: complete - patient participated  Level of consciousness: awake and alert  Pain management: adequate    Airway patency: patent  Anesthetic complications: No anesthetic complications  PONV Status: none  Cardiovascular status: acceptable  Respiratory status: acceptable  Hydration status: acceptable  Post Neuraxial Block status: No signs or symptoms of PDPH

## 2022-09-12 NOTE — DISCHARGE INSTRUCTIONS
"DISCHARGE INSTRUCTIONS   Dr. Swenson     Total Hip Replacement/Hip Hemiarthroplasty     Wound Care   1) Keep wound / incision area clean and dry.   2) Dressing to remain in place until post-operative day 7. Upon dressing removal, assess for wound drainage. If no drainage is present, keep wound / incision area open to air as much as possible. If drainage is present, place sterile dressing to cover wound and assess daily. If drainage continues to occur after post-operative day 14, call the office for an urgent appointment. (You should be seen in the clinic within 1-2 days of calling). DO NOT REMOVE SUTURES (IF PRESENT) UNDER ANY CIRCUMSTANCES PRIOR TO FOLLOW UP APPOINTMENT.  3) No baths or swimming until otherwise instructed. The wound must remain dry for 10 days after surgery. After 10 days, you may begin to shower only if no drainage is present. No submerging the wound under standing water until cleared by your physician (no baths, hot tubs, swimming pools, etc). Sponge baths are the best way to perform personal hygiene while at the same time protecting the wound from moisture.   4) Prior to showering, the wound must remain dry for 72 consecutive hours (no drainage whatsoever) prior to showering. If the wound drains or spots, the clock \"resets\" - make sure the wound has been drainage-free for 72 consecutive hours.   5) Once you are allowed to get the wound wet, please use gentle soap to wash the wound area. DO NOT aggressively scrub the wound with a washcloth or bath sponge. Please visually inspect your wound(s) at least once daily. If the wound(s) are in a difficult to see location, please use a mirror or have someone else assist with visual inspection.   6) No scrubbing the wound. You may \"pad dry\" the wound, but do not rub, as this may open up the wound and pre-dispose to wound infection.   7) Do not apply lotions or creams to incision site, unless instructed otherwise.   8) Observe for redness, swelling, or " drainage. Please call the clinic immediately if you have fevers, chills with warmth/redness surrounding wound site or if you notice pus drainage from the wound site     Activity   No heavy lifting objects greater than 10 pounds.   No driving while on narcotic pain medication.   No submerging wound under standing water (pool, bath tub, etc.) until otherwise instructed.   You may be protected weightbearing as tolerated on your operative (right lower) extremity   Use a walker for ambulation for at least 2 weeks after surgery.  May wean from walker after 2 weeks if approved by your therapist.   Posterior hip precautions for 6 weeks: No bending the hip past 90 degrees. Do not allow the leg to cross the midline of your body (adduction). No twisting motions. Ask your physical therapist to review these precautions with you.     Blood Clot Prophylaxis   (Aspirin vs. Lovenox vs. Eliquis administration is determined by your surgeon and tailored to your specific risk profile. You will be discharged with one of these medications.) You will need to complete a total 4 week course of enteric coated aspirin 325 mg (or 81mg) twice daily or Eliquis 2.5mg twice daily, in order to minimize your risk of blood clots following surgery. You will be supplied with a prescription to obtain this. Alternatively, you will need to compete a total 2 week course of Lovenox after surgery (followed by a 2 week course of aspirin twice daily), in order to minimize the risk of blood clots following surgery. Lovenox requires a single shot in the abdomen, to be taken once daily. You will be supplied with the prescription to obtain this. Prior to your discharge from the hospital, the nursing staff will instruct you on self-administration of the Lovenox, if you will be returning directly home from the hospital.     Discharge Pain Medications   You will be given a prescription for pain medication. You should start taking this the same day after your surgery.  "Wean off as tolerated. Do not wait to take the pain medication until the pain is severe, as it will be difficult to \"catch up\" once this occurs. The pain medication usually reaches its full effect ~1 hour after ingesting. If you have been sent home on Colace, this medication should be taken until you are off all narcotic (i.e. Oxycodone, etc) pain medications, in order to prevent constipation. If you have been sent home with a combination of oxycodone and Tylenol, please take Tylenol as scheduled.  You must be careful not to exceed 4,000mg (4 grams) of Tylenol. The oxycodone is to be taken as needed for \"breakthrough\" pain.  Some common side effects of the narcotic pain medications include nausea and itching. Benadryl is a great over the counter medication that helps calm your stomach, decreases your anxiety levels, and minimizes the itching. You can easily purchase this at your local pharmacy as an over-the-counter medication. Please abide by the instructions as printed on the bottle. If your nausea persists, make sure to take small amounts of crackers or other lighter foods.     Follow-Up   Follow-up with Dr. Swenson's office in 3 weeks from the surgery date for a post-operative evaluation. Have the following xrays done upon arrival to the follow-up appointment: AP pelvis. Please call Dr. Swenson's office at [phone] for orthopaedic appointments or questions.[facility] COLD THERAPY - PATIENT INSTRUCTION SHEET    Cold Compression Therapy for your comfort and rehabilitation  Your caregivers want you to be productive in your rehab and comfortable during your stay. In keeping with those goals, you will be receiving an [facility] Cold Therapy Wrap to help ease post-operative pain and swelling that might keep you from getting back on track! Your SMI Cold Therapy Wrap is effective and simple-to-use, and you will be encouraged to apply it throughout your hospital stay and at home through the duration of your recovery.    When you " are ready to go home  Be sure to take your SMI Cold Therapy Wrap and both sets of Gel Bags with you for continued comfort and use throughout your rehabilitation. If you don't already have them, ask your nurse or aide to retrieve your SMI Gel Bags from the patient freezer.    Home use precautions  Always follow your medical professional's application instructions upon discharge. Your SMI Cold Therapy Wrap and Gel Bags are designed to last for months following your surgery. Never heat the Gel Bags unless specified by your healthcare provider. Supervision is advised when using this product on children or geriatric patients. To avoid danger of suffocation, please keep the outer plastic packaging away from children & pets.    Cold Therapy Instructions  Place Gel Bags in a freezer set ¾ of the way to max temperature for at least (4) hours. For best results, lay the Gel Bags flat and [id number] in the freezer. Once frozen, slide Gel Bags into the gel pouch and secure your wrap to the affected area with the straps.  Gel wraps that have been stored in a freezer for an extended period of time may require a (10) minute period of softening up in a room temperature environment before application.  The gel pouch acts as a protective barrier. NEVER place frozen bags directly onto skin, as this may cause frostbite injury.  The SMI Cold Therapy Wrap is designed to be able to be worm while ambulating. The compression straps can be secured well enough so that the Wrap won't fall off while moving.  Wrap Application Videos can be viewed at [email or web address].  An additional protective barrier such as clothing, a washcloth, hand-towel or pillowcase may be used during prolonged treatment applications.  The Gel-Pouch and Wrap are both Latex-Free and the Gel Bag ingredients are non toxic.    [facility] Wrap care instructions  The SMI Cold Therapy Wrap may be hand washed and hung to dry when needed.    [facility] re-order information  Additional  [facility] body specific wraps and/or Gel Bags can be re-ordered from [email or web address] or call [phone] ([phone])

## 2022-09-12 NOTE — INTERVAL H&P NOTE
"[facility] Pre-op    Full history and physical note from office is attached.    /93 (BP Location: Right arm, Patient Position: Lying)   Pulse 93   Temp 97.6 °F (36.4 °C) (Temporal)   Resp 18   Ht 175.3 cm (69\")   Wt 88.9 kg (196 lb)   SpO2 97%   BMI 28.94 kg/m²     Immunizations:  Influenza:  2021  Pneumococcal:  UTD  Tetanus:  UTD  Covid x2: 2021      LAB Results:  Lab Results   Component Value Date    WBC 9.94 09/02/2022    HGB 14.6 09/02/2022    HCT 41.0 09/02/2022    MCV 94.0 09/02/2022     09/02/2022    NEUTROABS 5.74 09/02/2022    GLUCOSE 95 09/02/2022    BUN 22 09/02/2022    CREATININE 1.18 09/02/2022    EGFRIFNONA 75 11/09/2021     09/02/2022    K 4.4 09/02/2022     09/02/2022    CO2 24.0 09/02/2022    MG 2.1 07/27/2022    PHOS 1.6 (L) 01/02/2018    CALCIUM 9.8 09/02/2022    ALBUMIN 4.30 09/02/2022    AST 18 09/02/2022    ALT 18 09/02/2022    BILITOT 0.4 09/02/2022    PTT 28.0 09/02/2022    INR 1.00 09/02/2022       Cancer Staging (if applicable)  Cancer Patient: __ yes __no __unknown__N/A; If yes, clinical stage T:__ N:__M:__, stage group or __N/A      Impression: Primary osteoarthritis of right hip      Plan: RIGHT TOTAL HIP ARTHROPLASTY      Magaly Serrano, MK   9/12/2022   08:36 EDT   "

## 2022-09-12 NOTE — H&P
Patient Name: Kt Davis  MRN: [id number]  : 1955  DOS: 2022    Attending: Davin Swenson MD    Primary Care Provider: Tani Vazquez MD      Chief complaint: Right hip pain    Subjective   Patient is a pleasant 67 y.o. male presented for scheduled surgery by Dr. Swenson    Per his note (This patient has a history of progressive right hip pain and arthritis. The hip pain is severe with activity and has progressed significantly. Non-operative treatment has been attempted, but has not improved or controlled symptoms during normal daily activities. Motion has become limited and rotation severely restricted. X-rays reveal moderate-to-severe eburnation of articular cartilage on the superior weight bearing surface of the hip with circumferential acetabular and femoral neck osteophytes consistent with advanced hip osteoarthritis. A total hip arthroplasty was recommended at this time.)      Patient underwent right total hip arthroplasty under spinal anesthesia and periarticular block, tolerated surgery well, is admitted for further management.    Seen in his room postop, doing fairly well, spinal anesthesia effect subsided, awaiting PT session.  No complaints of nausea, vomiting, or shortness of breath.    No history of DVT or PE.      Allergies   Allergen Reactions   • Lisinopril Cough   • Valsartan Cough       Medications Prior to Admission   Medication Sig Dispense Refill Last Dose   • acetaminophen (TYLENOL) 650 MG 8 hr tablet Take 650 mg by mouth Every 8 (Eight) Hours As Needed for Mild Pain .   2022 at 0800   • amLODIPine (NORVASC) 5 MG tablet Take 1 tablet by mouth Daily. 30 tablet 3 2022 at 2100   • celecoxib (CeleBREX) 200 MG capsule Take 200 mg by mouth 2 (Two) Times a Day.   2022 at 0700   • Chlorhexidine Gluconate 4 % solution Shower daily with hibiclens solution as directed 5 days prior to surgery. 237 mL 0 2022 at Unknown time   • Dextromethorphan-guaiFENesin  (MUCUS RELIEF DM PO) Take 1,200 mg by mouth As Needed.   Past Month at Unknown time   • Magnesium Oxide 500 MG tablet Take 1 tablet by mouth Daily.   9/11/2022 at 0800   • Multiple Vitamins-Minerals (MULTIVITAL PO) Take 1 dose by mouth Every Morning.   9/11/2022 at 0800   • multivitamin with minerals tablet tablet Take 1 tablet by mouth Daily.   9/11/2022 at 0800   • mupirocin (BACTROBAN) 2 % ointment Apply a pea-sized amount to each nostril twice daily as directed by provider for 5 days prior to surgery. 22 g 0 9/11/2022 at 2100       Past Medical History:   Diagnosis Date   • Arthritis    • Hip arthrosis 01/2021   • History of esophageal stricture 2015   • History of gastric ulcer 2017   • History of transfusion 2017    4 units due to GI bleed, no reactions   • Hypertension    • Inguinal hernia    • Knee swelling 01/2022   • Post-traumatic brain syndrome 2018    r/t falling outside in winter. 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, concussion and brain bleed     Past Surgical History:   Procedure Laterality Date   • APPENDECTOMY  1999   • COLONOSCOPY  2015   • ESOPHAGEAL DILATATION  2015   • INGUINAL HERNIA REPAIR Bilateral 06/18/2020    Procedure: INGUINAL HERNIA REPAIR BILATERAL WITH MESH;  Surgeon: Johnathan Durant MD;  Location: [facility], 1928–[facility];  Service: General;  Laterality: Bilateral;   • TONSILLECTOMY AND ADENOIDECTOMY  1959   • UPPER GASTROINTESTINAL ENDOSCOPY  2015     Family History   Problem Relation Age of Onset   • Colon cancer Mother    • Cancer Mother         Colon Cancer at age 83   • Osteoporosis Mother    • Parkinsonism Father    • Heart disease Father    • Arthritis Father    • Hyperlipidemia Father    • Broken bones Father         Broken Leg   • Allergies Daughter    • Asthma Daughter    • No Known Problems Son    • Other Maternal Grandmother         Unknown   • Heart block Maternal Grandfather    • Macular degeneration Paternal Grandmother    • Diverticulitis Paternal Grandfather    • Emphysema Paternal  "Grandfather    • Arthritis Brother      Social History     Tobacco Use   • Smoking status: Never Smoker   • Smokeless tobacco: Never Used   Vaping Use   • Vaping Use: Never used   Substance Use Topics   • Alcohol use: No   • Drug use: No   .  Retired  from [employer]    Review of Systems  Pertinent items are noted in HPI, all other systems reviewed and negative    Vital Signs  /72 (BP Location: Right arm, Patient Position: Lying)   Pulse 77   Temp 98.1 °F (36.7 °C) (Temporal)   Resp 18   Ht 175.3 cm (69\")   Wt 88.9 kg (196 lb)   SpO2 94%   BMI 28.94 kg/m²     Physical Exam:    General Appearance:    Alert, cooperative, in no acute distress   Head:    Normocephalic, without obvious abnormality, atraumatic   Eyes:            Lids and lashes normal, conjunctivae and sclerae normal, no   icterus, no pallor, corneas clear    Ears:    Ears appear intact with no abnormalities noted   Throat:   No oral lesions, no thrush, oral mucosa moist   Neck:   No adenopathy, supple, trachea midline, no thyromegaly         Lungs:     Clear to auscultation,respirations regular, even and   unlabored. No wheezes or rales.    Heart:    Regular rhythm and normal rate, normal S1 and S2, no murmur, no gallop   Abdomen:     Normal bowel sounds, no masses, no organomegaly, soft        non-tender, non-distended, no guarding, no rebound                 tenderness   Genitalia:    Deferred   Extremities:  Right LE, CDI dressing Aquacel over right hip.  Clubbing, cyanosis, or edema distally.  Abduction pillow present   Pulses:   Pulses palpable and equal bilaterally   Skin:   No bleeding, bruising or rash   Neurologic:   Cranial nerves 2 - 12 grossly intact, intact flexion and dorsiflexion bilateral feet.      I reviewed the patient's new clinical results.             Invalid input(s): NEUTOPHILPCT,  EOSPCT        Invalid input(s): LABALBU, PROT  Lab Results   Component Value Date    HGBA1C 5.10 09/02/2022      " Latest Reference Range & Units 09/02/22 15:41   Glucose 65 - 99 mg/dL 95   Sodium 136 - 145 mmol/L 139   Potassium 3.5 - 5.2 mmol/L 4.4   CO2 22.0 - 29.0 mmol/L 24.0   Chloride 98 - 107 mmol/L 104   Anion Gap 5.0 - 15.0 mmol/L 11.0   Creatinine 0.76 - 1.27 mg/dL 1.18   BUN 8 - 23 mg/dL 22   BUN/Creatinine Ratio 7.0 - 25.0  18.6   Calcium 8.6 - 10.5 mg/dL 9.8   eGFR >60.0 mL/min/1.73 67.2   Alkaline Phosphatase 39 - 117 U/L 84   Total Protein 6.0 - 8.5 g/dL 7.3   ALT (SGPT) 1 - 41 U/L 18   AST (SGOT) 1 - 40 U/L 18   Total Bilirubin 0.0 - 1.2 mg/dL 0.4   Albumin 3.50 - 5.20 g/dL 4.30   Globulin gm/dL 3.0   A/G Ratio g/dL 1.4      Latest Reference Range & Units 09/02/22 15:41   WBC 3.40 - 10.80 10*3/mm3 9.94   RBC 4.14 - 5.80 10*6/mm3 4.36   Hemoglobin 13.0 - 17.7 g/dL 14.6   Hematocrit 37.5 - 51.0 % 41.0   RDW 12.3 - 15.4 % 11.9 (L)   MCV 79.0 - 97.0 fL 94.0   MCH 26.6 - 33.0 pg 33.5 (H)   MCHC 31.5 - 35.7 g/dL 35.6   MPV 6.0 - 12.0 fL 9.0   Platelets 140 - 450 10*3/mm3 199   RDW-SD 37.0 - 54.0 fl 41.1   (L): Data is abnormally low  (H): Data is abnormally high    Assessment and Plan:       Status post total replacement of right hip    Primary osteoarthritis of right hip      Plan:    1. PT/OT, protected weight bearing as tolerated right LE.  Posterior hip precautions 6 weeks  2. Pain control-prns  3. IS-encourage  4. DVT proph- Mechanicals and has  5. Bowel regimen  6. Resume home medications as appropriate  7. DC planning for home    Patient is very motivated to work with physical therapy and achieve  mobility and pain control among other goals for possible discharge home later in the day.    We reviewed these goals and discussed with patient tracking his progress for the next few hours and if all is achieved to receive next antibiotic prophylactic dose and be discharged home.     We discussed medications and precriptions at time of discharge including DVT prophylaxis, pain control, and bowel regimen.  All questions  were answered .    Patient expressed understanding and agreement.      Dragon disclaimer:  Part of this encounter note is an electronic transcription/translation of spoken language to printed text. The electronic translation of spoken language may permit erroneous, or at times, nonsensical words or phrases to be inadvertently transcribed; Although I have reviewed the note for such errors, some may still exist.    Tom Puente MD  09/12/22  14:29 EDT

## 2022-09-12 NOTE — ANESTHESIA PROCEDURE NOTES
Spinal Block    Pre-sedation assessment completed: 9/12/2022 10:00 AM    Patient reassessed immediately prior to procedure    Patient location during procedure: OR  Start Time: 9/12/2022 10:00 AM  Stop Time: 9/12/2022 10:05 AM  Performed ByEDGARDO: Crystal Tam SRNA  Preanesthetic Checklist  Completed: patient identified, IV checked, site marked, risks and benefits discussed, surgical consent, monitors and equipment checked, pre-op evaluation and timeout performed  Spinal Block Prep:  Patient Position:sitting  Sterile Tech:cap, gloves, mask and sterile barriers  Prep:Chloraprep  Patient Monitoring:continuous pulse oximetry  Spinal Block Procedure  Approach:midline  Guidance:landmark technique  Location:L2-L3  Needle Type:Pencan  Needle Gauge:25 G  Placement of Spinal needle event:cerebrospinal fluid aspirated  Paresthesia: no  Fluid Appearance:clear  Medications: bupivacaine (MARCAINE) 0.5 % injection, 2 mL  Med Administered at 9/12/2022 10:05 AM   Post Assessment  Patient Tolerance:patient tolerated the procedure well with no apparent complications  Complications no

## 2022-09-12 NOTE — OUTREACH NOTE
Prep Survey    Flowsheet Row Responses   [facility] discharged from? [facility]   Is LACE score < 7 ? No   Emergency Room discharge w/ pulse ox? No   Eligibility [facility]   Date of Admission 09/12/22   Date of Discharge 09/12/22   Discharge Disposition Home or Self Care   Discharge diagnosis [place] hip replacement   Does the patient have one of the following disease processes/diagnoses(primary or secondary)? Total Joint Replacement   Does the patient have Home health ordered? No   Is there a DME ordered? No   Prep survey completed? Yes          ROSALINA MCHUGH - Registered Nurse

## 2022-09-12 NOTE — ADDENDUM NOTE
Addendum  created 09/12/22 1231 by Yang Talavera, EMELYN    Order list changed, Order sets accessed, Pharmacy for encounter modified

## 2022-09-12 NOTE — ANESTHESIA PREPROCEDURE EVALUATION
Anesthesia Evaluation     Patient summary reviewed and Nursing notes reviewed   no history of anesthetic complications:  NPO Solid Status: > 8 hours  NPO Liquid Status: > 2 hours           Airway   Mallampati: II  TM distance: >3 FB  Neck ROM: full  No difficulty expected  Dental - normal exam     Pulmonary     breath sounds clear to auscultation  Cardiovascular   Exercise tolerance: good (4-7 METS)    ECG reviewed  Rhythm: regular  Rate: normal    (+) hypertension well controlled less than 2 medications,       Neuro/Psych  (+) CVA residual symptoms, psychiatric history Anxiety, dementia,    GI/Hepatic/Renal/Endo      Musculoskeletal     Abdominal   (+) obese,     Abdomen: soft.   Substance History      OB/GYN          Other   arthritis,                      Anesthesia Plan    ASA 3     spinal     intravenous induction     Anesthetic plan, risks, benefits, and alternatives have been provided, discussed and informed consent has been obtained with: patient.    Plan discussed with CRNA.        CODE STATUS:

## 2022-09-12 NOTE — ADDENDUM NOTE
Addendum  created 09/12/22 1413 by Beni Perez CRNA    Delete clinical note, Intraprocedure Blocks edited, Order Canceled from Note, Pend clinical note

## 2022-09-12 NOTE — THERAPY DISCHARGE NOTE
Patient Name: Kt Davis  : 1955    MRN: [id number]                              Today's Date: 2022       Admit Date: 2022    Visit Dx:     ICD-10-CM ICD-9-CM   1. Status post total replacement of right hip  Z96.641 V43.64   2. Primary osteoarthritis of right hip  M16.11 715.15     Patient Active Problem List   Diagnosis   • SDH (subdural hematoma) (HCC)   • SAH (subarachnoid hemorrhage) (HCC)   • Fall due to ice or snow   • Blunt head trauma due to fall   • Traumatic subarachnoid hemorrhage with loss of consciousness of 30 minutes or less (HCC)   • Essential hypertension   • Inguinal hernia of left side without obstruction or gangrene   • Medicare annual wellness visit, subsequent   • Inguinal hernia   • Encounter for screening for malignant neoplasm of prostate    • Chronic right hip pain   • Chronic pain of both knees   • Avascular necrosis of hip, right (HCC)   • Quadriceps weakness   • Leg heaviness   • Nevus   • Primary osteoarthritis of right hip   • Status post total replacement of right hip     Past Medical History:   Diagnosis Date   • Arthritis    • Hip arthrosis 2021   • History of esophageal stricture    • History of gastric ulcer 2017   • History of transfusion 2017    4 units due to GI bleed, no reactions   • Hypertension    • Inguinal hernia    • Knee swelling 2022   • Post-traumatic brain syndrome 2018    r/t falling outside in winter. 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, concussion and brain bleed     Past Surgical History:   Procedure Laterality Date   • APPENDECTOMY     • COLONOSCOPY     • ESOPHAGEAL DILATATION     • INGUINAL HERNIA REPAIR Bilateral 2020    Procedure: INGUINAL HERNIA REPAIR BILATERAL WITH MESH;  Surgeon: Johnathan Durant MD;  Location: [facility];  Service: General;  Laterality: Bilateral;   • TONSILLECTOMY AND ADENOIDECTOMY     • UPPER GASTROINTESTINAL ENDOSCOPY        General Information     Row Name 22 [id number]           Physical Therapy Time and Intention    Document Type evaluation;discharge evaluation/summary  -SC     Mode of Treatment physical therapy  -SC     Row Name 09/12/22 1619          General Information    Patient Profile Reviewed yes  -SC     Prior Level of Function independent:;gait;transfer  had been sleeping in a recliner and ambulating with a cane  -SC     Existing Precautions/Restrictions right;hip, posterior  -SC     Barriers to Rehab none identified  -SC     Row Name 09/12/22 1619          Living Environment    People in Home spouse  -SC     Row Name 09/12/22 1619          Home Main Entrance    Number of Stairs, Main Entrance three;other (see comments)  also can go in the back without steps  -SC     Stair Railings, Main Entrance none  -SC     Row Name 09/12/22 1619          Stairs Within Home, Primary    Number of Stairs, Within Home, Primary none  -SC     Row Name 09/12/22 1619          Cognition    Orientation Status (Cognition) oriented x 4  -SC     Row Name 09/12/22 1619          Safety Issues, Functional Mobility    Impairments Affecting Function (Mobility) balance;motor control;pain;range of motion (ROM)  -SC     Comment, Safety Issues/Impairments (Mobility) also has arthritis in Left hip. Patient alert and following directions  -SC           User Key  (r) = Recorded By, (t) = Taken By, (c) = Cosigned By    Initials Name Provider Type    SC Gina Gutiérrez PT Physical Therapist               Mobility     Row Name 09/12/22 1621          Bed Mobility    Bed Mobility scooting/bridging;supine-sit  -SC     Scooting/Bridging [place] (Bed Mobility) verbal cues;supervision  -SC     Supine-Sit [place] (Bed Mobility) verbal cues;independent  -SC     Comment, (Bed Mobility) able to get up into long sitting and get to edge of bed scooting with B UE assist with patient cues for sequencing  -SC     Row Name 09/12/22 1621          Transfers    Comment, (Transfers) STS from EOB with cues for sequencing and  protecting his hip precautions. Demonstrated good technique.  -[facility] Name 09/12/22 1621          Sit-Stand Transfer    Sit-Stand [place] (Transfers) modified independence  -SC     Assistive Device (Sit-Stand Transfers) walker, front-wheeled  -[facility] Name 09/12/22 1621          Gait/Stairs (Locomotion)    [place] Level (Gait) contact guard;1 person assist  -SC     Assistive Device (Gait) walker, front-wheeled  -SC     Distance in Feet (Gait) 400  -SC     Deviations/Abnormal Patterns (Gait) right sided deviations;stride length decreased;weight shifting decreased;antalgic  -SC     Left Sided Gait Deviations Trendelenburg sign;weight shift ability decreased  -SC     Assistive Device (Stairs) cane, straight  -SC     Handrail Location (Stairs) --  HHA on R UE  -SC     Number of Steps (Stairs) 3  -SC     Comment, (Gait/Stairs) GT training focused on achieving step through gait pattern with equal wt shifting. Cues given or this pattern and encouragement for upright posture. NO LOB noted. Improvement with wt shifting noted. Wife present for stair training using st cane . Sequencing for cane issued. Patient has some difficulty advancing L leg up stairs. Extra time needed here. Recommended using back entrance to home today.  -[facility] Name 09/12/22 1621          Mobility    Extremity Weight-bearing Status right lower extremity  -SC     Right Lower Extremity (Weight-bearing Status) weight-bearing as tolerated (WBAT)  -SC           User Key  (r) = Recorded By, (t) = Taken By, (c) = Cosigned By    Initials Name Provider Type    SC Gina Gutiérrez, PT Physical Therapist               Obj/Interventions     Row Name 09/12/22 1627          Range of Motion Comprehensive    General Range of Motion no range of motion deficits identified  -SC     Row Name 09/12/22 1627          Strength Comprehensive (MMT)    Comment, General Manual Muscle Testing (MMT) Assessment R LE: tib ant 4/5, quads 4+/5 L LE  4+/5 grossly  -SC      Row Name 09/12/22 1627          Motor Skills    Therapeutic Exercise hip;knee  -SC     Row Name 09/12/22 1627          Hip (Therapeutic Exercise)    Hip (Therapeutic Exercise) AROM (active range of motion)  -SC     Hip AROM (Therapeutic Exercise) right;flexion;extension;aBduction;aDduction;supine;5 repetitions  -SC     Row Name 09/12/22 1627          Knee (Therapeutic Exercise)    Knee (Therapeutic Exercise) AROM (active range of motion);isometric exercises  -SC     Knee AROM (Therapeutic Exercise) flexion;extension;right;sitting;10 repetitions;LAQ (long arc quad)  -SC     Knee Isometrics (Therapeutic Exercise) right;gluteal sets;quad sets;3 second hold;5 repetitions  -SC     Row Name 09/12/22 1627          Balance    Balance Assessment standing dynamic balance  -SC     Dynamic Standing Balance modified independence  -SC     Position/Device Used, Standing Balance supported;walker, rolling  -SC     Row Name 09/12/22 1627          Sensory Assessment (Somatosensory)    Sensory Assessment (Somatosensory) sensation intact  -SC           User Key  (r) = Recorded By, (t) = Taken By, (c) = Cosigned By    Initials Name Provider Type    SC Gina Gutiérrez, PT Physical Therapist               Goals/Plan    No documentation.                Clinical Impression     Row Name 09/12/22 1629          Pain    Additional Documentation Pain Scale: FACES Pre/Post-Treatment (Group)  -SC     Row Name 09/12/22 1629          Pain Scale: FACES Pre/Post-Treatment    Pain: FACES Scale, Pretreatment 2-->hurts little bit  -SC     Posttreatment Pain Rating 4-->hurts little more  -SC     Pain Location - Side/Orientation Right  -SC     Pain Location - hip  -SC     Pre/Posttreatment Pain Comment no knee pain  -SC     Row Name 09/12/22 1629          Plan of Care Review    Plan of Care Reviewed With patient  -SC     Progress improving  -SC     Outcome Evaluation Patient demonstrated safe mobility out of bed and on pabon with rolling walker. No loss  of balance noted. He is going home with his wife and has all equiptment. He is aware of his precautions. Recommend outpatient PT . His PADD scores is 10.  -[facility] Name 09/12/22 1629          Therapy Assessment/Plan (PT)    Patient/Family Therapy Goals Statement (PT) go home  -SC     Rehab Potential (PT) good, to achieve stated therapy goals  -SC     Criteria for Skilled Interventions Met (PT) yes;meets criteria  -SC     Therapy Frequency (PT) evaluation only  -[facility] Name 09/12/22 1629          Vital Signs    Pre Systolic BP Rehab 120  -SC     Pre Treatment Diastolic BP 80  in sitting  -[facility] Name 09/12/22 1629          Positioning and Restraints    Pre-Treatment Position in bed  -SC     Post Treatment Position chair  -SC     In Chair notified nsg;reclined;sitting;call light within reach;encouraged to call for assist;exit alarm on;with family/caregiver  -SC           User Key  (r) = Recorded By, (t) = Taken By, (c) = Cosigned By    Initials Name Provider Type    SC Gina Gutiérrez, PT Physical Therapist               Outcome Measures     Row Name 09/12/22 1633          How much help from another person do you currently need...    Turning from your back to your side while in flat bed without using bedrails? 4  -SC     Moving from lying on back to sitting on the side of a flat bed without bedrails? 4  -SC     Moving to and from a bed to a chair (including a wheelchair)? 4  -SC     Standing up from a chair using your arms (e.g., wheelchair, bedside chair)? 4  -SC     Climbing 3-5 steps with a railing? 3  -SC     To walk in hospital room? 3  -SC     AM-PAC 6 Clicks Score (PT) 22  -SC     Highest level of mobility 7 --> Walked 25 feet or more  -SC     Row Name 09/12/22 1633          PADD    Diagnosis 2  -SC     Gender 2  -SC     Age Group 1  -SC     Gait Distance 1  -SC     Assist Level 1  -SC     Home Support 3  -SC     PADD Score 10  -SC     Prediction by PADD Score directly home (with home health or  out-patient rehab)  -SC     Row Name 09/12/22 1633          Functional Assessment    Outcome Measure Options AM-PAC 6 Clicks Basic Mobility (PT);PADD  -SC           User Key  (r) = Recorded By, (t) = Taken By, (c) = Cosigned By    Initials Name Provider Type    SC Gina Gutiérrez PT Physical Therapist              Physical Therapy Education                 Title: PT OT SLP Therapies (In Progress)     Topic: Physical Therapy (Done)     Point: Mobility training (Done)     Learning Progress Summary           Patient Eager, E,TB,D,H, VU,DU by SC at 9/12/2022 1636    Comment: reviewed HEP and hip precautions   Family Eager, E,TB,D,H, VU,DU by SC at 9/12/2022 1636    Comment: reviewed HEP and hip precautions                   Point: Home exercise program (Done)     Learning Progress Summary           Patient Eager, E,TB,D,H, VU,DU by SC at 9/12/2022 1636    Comment: reviewed HEP and hip precautions   Family Eager, E,TB,D,H, VU,DU by SC at 9/12/2022 1636    Comment: reviewed HEP and hip precautions                   Point: Body mechanics (Done)     Learning Progress Summary           Patient Eager, E,TB,D,H, VU,DU by SC at 9/12/2022 1636    Comment: reviewed HEP and hip precautions   Family Eager, E,TB,D,H, VU,DU by SC at 9/12/2022 1636    Comment: reviewed HEP and hip precautions                   Point: Precautions (Done)     Learning Progress Summary           Patient Eager, E,TB,D,H, VU,DU by SC at 9/12/2022 1636    Comment: reviewed HEP and hip precautions   Family Eager, E,TB,D,H, VU,DU by SC at 9/12/2022 1636    Comment: reviewed HEP and hip precautions                               User Key     Initials Effective Dates Name Provider Type [facility] 06/16/21 -  Gina Gutiérrez PT Physical Therapist PT              PT Recommendation and Plan     Plan of Care Reviewed With: patient  Progress: improving  Outcome Evaluation: Patient demonstrated safe mobility out of bed and on pabon with rolling walker. No loss  of balance noted. He is going home with his wife and has all equiptment. He is aware of his precautions. Recommend outpatient PT . His PADD scores is 10.     Time Calculation:    PT Charges     Row Name 09/12/22 1515             Time Calculation    Start Time 1515  -SC      PT Received On 09/12/22  -SC              Time Calculation- PT    Total Timed Code Minutes- PT 30 minute(s)  -SC              Timed Charges    41825 - PT Therapeutic Exercise Minutes 15  -SC      19077 - Gait Training Minutes  15  -SC              Untimed Charges    PT Eval/Re-eval Minutes 35  -SC              Total Minutes    Timed Charges Total Minutes 30  -SC      Untimed Charges Total Minutes 35  -SC       Total Minutes 65  -SC            User Key  (r) = Recorded By, (t) = Taken By, (c) = Cosigned By    Initials Name Provider Type    SC Gina Gutiérrez PT Physical Therapist              Therapy Charges for Today     Code Description Service Date Service Provider Modifiers Qty    23004541425 HC PT THER PROC EA 15 MIN 9/12/2022 Gina Gutiérrez, PT GP 1    89135389897 HC GAIT TRAINING EA 15 MIN 9/12/2022 Gina Gutiérrez, PT GP 1    [id number]  PT EVAL MOD COMPLEXITY 3 9/12/2022 Gina Gutiérrez PT GP 1          PT G-Codes  Outcome Measure Options: AM-PAC 6 Clicks Basic Mobility (PT), PADD  AM-PAC 6 Clicks Score (PT): 22    PT Discharge Summary  Anticipated Discharge Disposition (PT): home with outpatient therapy services    Gina Gutiérrez PT  9/12/2022

## 2022-09-12 NOTE — PLAN OF CARE
Goal Outcome Evaluation:  Plan of Care Reviewed With: patient        Progress: improving  Outcome Evaluation: Patient demonstrated safe mobility out of bed and on pabon with rolling walker. No loss of balance noted. He is going home with his wife and has all equiptment. He is aware of his precautions. Recommend outpatient PT . His PADD scores is 10.

## 2022-09-12 NOTE — OP NOTE
OPERATIVE REPORT     DATE OF PROCEDURE: 9/12/2022    SURGEON: Davin Swenson M.D.     ASSISTANT(S): Circulator: Tennille Engel RN; Chel Beal RN  Scrub Person: Madelyn Puga; Chhaya Barton  Vendor Representative: Cruz De Dios; [employer]  Nursing Assistant: Jhonatan Garcia PCT  Assistant: Nanci Berger PA-C  Assistant: Nanci Berger PA-C    Note-PA was utilized during the case to facilitate positioning the patient, exposure, retraction, placement of final components and definitive closure.    PREOPERATIVE DIAGNOSIS: Advanced degenerative joint disease of the right hip secondary to osteoarthritis    POSTOPERATIVE DIAGNOSIS: same     PROCEDURE: Right total Hip Arthroplasty     SURGICAL DETAILS:     APPROACH: Posterior    ANESTHESIA: Spinal plus local periarticular block    PREOPERATIVE ANTIBIOTICS: Ancef 2 g IV    TRANEXAMIC ACID: IV    ESTIMATED BLOOD LOSS: 300 cc     SPECIMENS: None    IMPLANTS:   : Isa  Acetabular component: 52 mm Trident 2   Acetabular screws: 2  Acetabular liner: 0 degree X3   Femoral component: Accolade  degree size 6  Femoral head: 36+5 mm delta Biolox ceramic     DRAINS: None    LOCAL INJECTION: 1 cc Toradol 30mg/ml, 4 cc duramorph 2mg/ml, 20 cc 0.5% ropivicaine, 20 cc 0.5% lidocaine with 1:200,000 epinephrine, 15 cc preservative free normal saline     MODIFIER(S): None    COMPLICATIONS: None apparent    INDICATIONS FOR PROCEDURE: This patient has a history of progressive right hip pain and arthritis. The hip pain is severe with activity and has progressed significantly. Non-operative treatment has been attempted, but has not improved or controlled symptoms during normal daily activities. Motion has become limited and rotation severely restricted. X-rays reveal moderate-to-severe eburnation of articular cartilage on the superior weight bearing surface of the hip with circumferential acetabular and femoral neck osteophytes consistent with  advanced hip osteoarthritis. A total hip arthroplasty was recommended at this time. The risks, benefits, alternatives, and potential complications of the arthroplasty surgery were discussed with the patient in detail to include but not limited to infection, bleeding, anesthesia risks, sciatic nerve palsy, instability/dislocation, limb length discrepancy, aseptic loosening, osteolysis, blood clots, continued pain, iatrogenic fracture, myocardial infarction, stroke, and death. Specific details of the procedure, hospitalization, recovery, rehabilitation, and long-term precautions were also provided. Pre-operative teaching was provided. Implant/prosthesis selection was outlined, and the many options available were explained; the final choice will be made at the time of the procedure to match the anatomy and condition of the bone, ligaments, tendons, and muscles. Understanding of all topics was conveyed to me by the patient, and consent was given to proceed with a right total hip arthroplasty. The patient completed preoperative medical optimization and risk assessment, joint arthroplasty education, and MRSA decolonization using a universal decolonization protocol. Perioperative blood management and the potential for blood transfusion were discussed with risks and options clearly outlined.     INTRAOPERATIVE FINDINGS: End-stage osteoarthritis right hip    PROCEDURE: The patient was identified in the preoperative holding area. The operative site was confirmed and marked. A sequential compression device was placed on the nonoperative leg. The risks, benefits, and alternatives to surgery were again confirmed with the patient and the patient wished to proceed. The patient was brought to the operating room and placed on the operating room table in the supine position. A huddle was performed with the patient and all vital surgical team members to confirm the correct operative site, procedure, anesthesia type, and operative plan  with the patient. After anesthesia was performed, the patient was positioned in the lateral decubitus position on the pegboard and secured with the operative side up. An axillary roll was placed in the axilla and all bony prominences and pressure points were checked and padded. A relative leg length assessment was carried out and markers were placed for intraoperative assessment. Intravenous antibiotic prophylaxis was given and confirmed with the anesthesia team.     The operative leg was prepped and draped in the usual sterile fashion. A surgical time out was performed immediately preceding the incision with all personnel in the operating room to again confirm patient identity, the correct operative site and extremity, correct radiographic studies, availability of appropriate surgical equipment and agreement on the planned procedure. A posterolateral approach to the hip was performed through an incision centered over the greater trochanter. The incision was carried through the subcutaneous tissue to the underlying fascia sheryl and gluteus landen fascia, which were incised and split posteriorly over the trochanter in the direction of the fibers. Hemostasis was obtained with electrocautery. The Charnley retractor was placed after carefully palpating the sciatic nerve which was protected throughout the case.     A standard posterior approach to the hip was performed by releasing the piriformis, short external rotators and posterior capsule and reflecting them posteriorly as a rectangular flap. The superior capsule was scarred down; it was released and excised. The labrum was split and the femoral head mobilized. The hip was then flexed, internally rotated and dislocated from the acetabulum without excessive force. Assessment of the femoral head revealed eburnation of the articular cartilage with complete loss of the weight bearing chondral surface. Osteophytes were present as well. Careful measurements were performed  using the center of the femoral head and the lesser trochanter as markers and a femoral neck cut was made according to the preoperative plan.     Attention was then turned to the acetabulum. Retractors were placed circumferentially for wide acetabular exposure. The labrum and osteophytes were debrided from the rim, and the medial wall was identified and the depth of the socket assessed by excising the pulvinar. Bleeders were controlled, especially the area of the obturator artery with the electrocautery. Acetabular reaming was then started with the hemispherical instrument matching the size of the excised femoral head. Sequential reaming of the acetabulum was then performed by increasing size in 2 mm increments.  Reaming was performed line to line. The reamers created an excellent hemispherical bed of bleeding cancellous bone. The cup was impacted into position, targeting 40-45 degrees of abduction and 20-25 degrees of anteversion, with an excellent press-fit. The press-fit was firm, stable, and apically seated. 2 screw(s) were used for additional support of the fixation. Further osteophyte debridement was done around the socket. All impinging soft tissue was removed from the edges of the socket. The polyethylene bearing/liner was then impacted into place and checked for stability.     Attention was then turned to the femur. The leg was positioned so access did not result in soft-tissue injury. The femoral preparation was started with a box osteotome. The medullary cavity of the femur was then entered and opened with hand reamers. Femoral stem broaches were then employed in an incremental fashion up to the final size, targeting 15-20 degrees of anteversion. The final broach was fully seated, had good rotational and axial stability, and was seated at the appropriate height in relation to the greater trochanter and the preoperative plan. Trial reduction was done. Excellent stability and range of motion was achieved  without impingement at any position. The hip was stable in full extension and external rotation as well as in flexion past 90 degrees, 20 degrees adduction and 60-70 degrees of internal rotation. Leg lengths were re-created within millimeters based on the markers and relative measurement. The hip was then dislocated and the trials removed. The wound was copiously irrigated, and the permanent femoral stem was then impacted down in approximately 15-20 degrees of anteversion. The press-fit was firm, and stable to axial and rotational force in all planes. The permanent femoral head was then impacted on the clean trunnion. The socket and wound were irrigated, suctioned, and inspected for debris. The final reduction was performed, and again leg length assessment and stability assessment of the hip were performed to confirm optimal component selection and stability in all planes without impingement when stressed to the extremes.     The wound was irrigated with dilute betadine solution as well as saline, and hemostasis obtained with electrocautery. A pain cocktail was injected into the pericapsular tissues. The posterior capsule, piriformis, and short external rotators were repaired utilizing #2 Ticron through drill holes in the greater trochanter. The sciatic nerve was palpated and found to be intact and the wound was irrigated. Instrument and sponge counts were completed and confirmed correct. The fascia sheryl and gluteal fascia were closed with interrupted #1 Vicryl suture and oversewn with #2 Stratafix. The deep subcutaneous tissue was closed with running #1 Stratafix suture and the superficial subcutaneous tissue with interrupted 2-0 Vicryl suture. A 3-0 monocryl running stitch was used to close skin followed by skin glue adhesive to seal the wound. A silver impregnated dressing was then placed, followed by a sequential compression device to the operative limb, followed by an abduction pillow. The patient was then  returned to a supine position on the operating room table. The patient was sufficiently recovered from anesthesia, transferred to a hospital bed and taken to the PACU in stable condition.     One gram (1000 mg) of intravenous tranexamic acid was administered prior to incision. A second one gram (1000 mg) intravenous dose was given prior to wound closure.    No apparent complications occurred during the procedure Instrument, sponge and needle counts were correct x 2.     The patient underwent risk stratification preoperatively and aspirin was chosen for DVT prophylaxis. Delay in starting chemical prophylaxis for 23 hours from surgical incision was over concerns for hematoma formation and wound related issues.     POST OPERATIVE PLAN:   Protected weight bearing as tolerated   Posterior hip precautions x 6 weeks   PT/OT for mobilization and medical equipment needs   23 hours perioperative antibiotic prophylaxis   Pain control with PO/IV meds   Keep silver dressing in place for 7 days post op. Change dressing only if saturated.   SCD's to bilateral lower extremities   Social work for discharge planning needs   Follow up in 3 weeks for post operative wound check with XR AP pelvis.

## 2022-09-13 ENCOUNTER — TRANSITIONAL CARE MANAGEMENT TELEPHONE ENCOUNTER (OUTPATIENT)
Dept: CALL CENTER | Facility: HOSPITAL | Age: 67
End: 2022-09-13

## 2022-09-13 NOTE — CASE MANAGEMENT/SOCIAL WORK
Discharge Planning Assessment  [facility]     Patient Name: Kt Davis  MRN: [id number]  Today's Date: 9/13/2022    Admit Date: 9/12/2022     Discharge Needs Assessment    No documentation.                Discharge Plan     Row Name 09/13/22 0934       Plan    Plan Home    Patient/Family in Agreement with Plan yes    Plan Comments  has been discharged. He has all medical equipment at home that he needs. Outpatient physical therapy has been arranged through [facility] Transitions program. No additional discharge needs identified.    Final Discharge Disposition Code 01 - home or self-care              Continued Care and Services - Discharged on 9/12/2022 Admission date: 9/12/2022 - Discharge disposition: Home or Self Care    Therapy Coordination complete.    Service Provider Request Status Selected Services Address Phone Fax Patient Preferred    [facility]   Selected Outpatient Physical Therapy [address] [place] [id number] [phone] --              Expected Discharge Date and Time     Expected Discharge Date Expected Discharge Time    Sep 13, 2022          Demographic Summary    No documentation.                Functional Status    No documentation.                Psychosocial    No documentation.                Abuse/Neglect    No documentation.                Legal    No documentation.                Substance Abuse    No documentation.                Patient Forms    No documentation.                   Jayden Gray RN

## 2022-09-13 NOTE — OUTREACH NOTE
Call Center TCM Note    Flowsheet Row Responses   [facility] patient discharged from? [place]   Does the patient have one of the following disease processes/diagnoses(primary or secondary)? Total Joint Replacement   Joint surgery performed? Hip   TCM attempt successful? Yes   Call start time 1320   Call end time 1324   Discharge diagnosis [place] hip replacement   Does the patient have all medications related to this admission filled (includes all antibiotics, pain medications, etc.) Yes   Is the patient taking all medications as directed (includes completed medication regime)? Yes   Is the patient able to teach back alternate methods of pain control? Ice   Has home health visited the patient within 72 hours of discharge? Yes   Psychosocial issues? No   Does the patient have a wound vac in place? Yes   Has the patient fallen since discharge? No   If the patient has fallen, were there any injuries? No   Did the patient receive a copy of their discharge instructions? Yes   Nursing interventions Reviewed instructions with patient   What is the patient's perception of their functional status since discharge? Improving   Is the patient able to teach back signs and symptoms of infection? Incisional drainage, Blisters around incision, Increased swelling or redness around incision (not associated with surgical edema), Severe discomfort or pain, Shortness of breath or chest pain, Temp >100.4 for 24h or longer   Is the patient able to teach back how to prevent infection? Check incision daily   Is the patient able to teach back home safety measures? Ability to shower   If the patient is a current smoker, are they able to teach back resources for cessation? Not a smoker   Is the patient/caregiver able to teach back the hierarchy of who to call/visit for symptoms/problems? PCP, Specialist, Home health nurse, Urgent Care, ED, 911 Yes   TCM call completed? Yes          Isaura Mcdonald RN    9/13/2022, 13:24 EDT

## 2022-09-23 ENCOUNTER — READMISSION MANAGEMENT (OUTPATIENT)
Dept: CALL CENTER | Facility: HOSPITAL | Age: 67
End: 2022-09-23

## 2022-09-23 NOTE — OUTREACH NOTE
Total Joint Week 2 Survey    Flowsheet Row Responses   [facility] patient discharged from? [place]   Does the patient have one of the following disease processes/diagnoses(primary or secondary)? Total Joint Replacement   Joint surgery performed? Hip   Week 2 attempt successful? Yes   Call start time 1357   Call end time 1400   Has the patient been back in either the hospital or Emergency Department since discharge? No   Discharge diagnosis [facility] hip replacement   Person spoke with today (if not patient) and relationship patient   Does the patient have all medications related to this admission filled (includes all antibiotics, pain medications, etc.) Yes   Is the patient taking all medications as directed (includes completed medication regime)? Yes   Is the patient able to teach back alternate methods of pain control? Ice   Does the patient have a follow up appointment with their surgeon? Yes   Has the patient kept scheduled appointments due by today? Yes   Psychosocial issues? No   Has the patient began therapy sessions (either in the home or as an out patient)? Yes   If the patient has started attending therapy, what post op day did they begin to attend (either in home or as an out patient)?   Pt reports 3 at home PT visits.  He will start outpt PT on monday   Does the patient have a wound vac in place? No   Has the patient fallen since discharge? No   If the patient has fallen, were there any injuries? No   Did the patient receive a copy of their discharge instructions? Yes   Nursing interventions Reviewed instructions with patient   What is the patient's perception of their functional status since discharge? Improving   If the patient is a current smoker, are they able to teach back resources for cessation? Not a smoker   Week 2 call completed? Yes   Wrap up additional comments Pt reports he is doing well.  He denies needs.           ROSALINA MCHUGH - Registered Nurse

## 2022-10-04 ENCOUNTER — OFFICE VISIT (OUTPATIENT)
Dept: ORTHOPEDIC SURGERY | Facility: CLINIC | Age: 67
End: 2022-10-04

## 2022-10-04 VITALS — WEIGHT: 195.99 LBS | TEMPERATURE: 97.7 F | BODY MASS INDEX: 29.03 KG/M2 | HEIGHT: 69 IN

## 2022-10-04 DIAGNOSIS — Z09 SURGERY FOLLOW-UP: ICD-10-CM

## 2022-10-04 DIAGNOSIS — M16.12 PRIMARY OSTEOARTHRITIS OF LEFT HIP: Primary | ICD-10-CM

## 2022-10-04 PROCEDURE — 99214 OFFICE O/P EST MOD 30 MIN: CPT | Performed by: ORTHOPAEDIC SURGERY

## 2022-10-04 RX ORDER — PREGABALIN 75 MG/1
75 CAPSULE ORAL ONCE
Status: CANCELLED | OUTPATIENT
Start: 2022-10-04 | End: 2022-10-04

## 2022-10-04 RX ORDER — ACETAMINOPHEN 325 MG/1
1000 TABLET ORAL ONCE
Status: CANCELLED | OUTPATIENT
Start: 2022-10-04 | End: 2022-10-04

## 2022-10-04 RX ORDER — MELOXICAM 7.5 MG/1
15 TABLET ORAL ONCE
Status: CANCELLED | OUTPATIENT
Start: 2022-10-04 | End: 2022-10-04

## 2022-10-04 NOTE — PROGRESS NOTES
Orthopaedic Clinic Note:  Hip Post Op    Chief Complaint   Patient presents with   • Follow-up     3 weeks status post TOTAL HIP ARTHROPLASTY RIGHT  9/12/22        HPI     is 3  week(s) s/p right total hip arthroplasty.  Rates pain in the right hip is 0/10 on the pain scale.  His main limitation today is left hip pain which he rates a 7/10 on the pain scale.  He has known end-stage arthritis of both hips.  He is ambulating with assistance of a cane.  Denies fevers chills or constitutional symptoms.  Overall he is extremely happy with his right hip replacement is ready to pursue left total hip arthroplasty at this time.    Past Medical History:   Diagnosis Date   • Arthritis    • Hip arthrosis 01/2021   • History of esophageal stricture 2015   • History of gastric ulcer 2017   • History of transfusion 2017    4 units due to GI bleed, no reactions   • Hypertension    • Inguinal hernia    • Knee swelling 01/2022   • Post-traumatic brain syndrome 2018    r/t falling outside in winter. 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, concussion and brain bleed      Past Surgical History:   Procedure Laterality Date   • APPENDECTOMY  1999   • COLONOSCOPY  2015   • ESOPHAGEAL DILATATION  2015   • HIP SURGERY  9/12/2022   • INGUINAL HERNIA REPAIR Bilateral 06/18/2020    Procedure: INGUINAL HERNIA REPAIR BILATERAL WITH MESH;  Surgeon: Johnathan Durant MD;  Location:  [facility];  Service: General;  Laterality: Bilateral;   • JOINT REPLACEMENT  09/12/2022   • TONSILLECTOMY AND ADENOIDECTOMY  1959   • TOTAL HIP ARTHROPLASTY Right 09/12/2022    Procedure: TOTAL HIP ARTHROPLASTY RIGHT;  Surgeon: Davin Swenson MD;  Location:  [facility];  Service: Orthopedics;  Laterality: Right;   • UPPER GASTROINTESTINAL ENDOSCOPY  2015      Family History   Problem Relation Age of Onset   • Colon cancer Mother    • Cancer Mother         Colon Cancer at age 83   • Osteoporosis Mother    • Parkinsonism Father    • Heart disease Father    • Arthritis  Father    • Hyperlipidemia Father    • Broken bones Father         Broken Leg   • Allergies Daughter    • Asthma Daughter    • No Known Problems Son    • Other Maternal Grandmother         Unknown   • Heart block Maternal Grandfather    • Macular degeneration Paternal Grandmother    • Diverticulitis Paternal Grandfather    • Emphysema Paternal Grandfather    • Arthritis Brother      Social History     Socioeconomic History   • Marital status:      Spouse name: Shena   • Number of children: 2   • Years of education: College   Tobacco Use   • Smoking status: Never Smoker   • Smokeless tobacco: Never Used   Vaping Use   • Vaping Use: Never used   Substance and Sexual Activity   • Alcohol use: No   • Drug use: No   • Sexual activity: Yes     Partners: Female     Birth control/protection: None     Comment:       Current Outpatient Medications on File Prior to Visit   Medication Sig Dispense Refill   • amLODIPine (NORVASC) 5 MG tablet Take 1 tablet by mouth Daily. 30 tablet 3   • aspirin (ASPIR) 81 MG EC tablet Take 1 tablet by mouth 2 (Two) Times a Day. 60 tablet 0   • celecoxib (CeleBREX) 200 MG capsule Take 200 mg by mouth 2 (Two) Times a Day.     • Dextromethorphan-guaiFENesin (MUCUS RELIEF DM PO) Take 1,200 mg by mouth As Needed.     • Magnesium Oxide 500 MG tablet Take 1 tablet by mouth Daily.     • Multiple Vitamins-Minerals (MULTIVITAL PO) Take 1 dose by mouth Every Morning.     • oxyCODONE (Roxicodone) 5 MG immediate release tablet Take 1 tablet by mouth Every 4 (Four) Hours As Needed for Moderate Pain. 40 tablet 0   • [DISCONTINUED] multivitamin with minerals tablet tablet Take 1 tablet by mouth Daily.       No current facility-administered medications on file prior to visit.      Allergies   Allergen Reactions   • Lisinopril Cough   • Valsartan Cough        Review of Systems   Constitutional: Negative.    HENT: Negative.    Eyes: Negative.    Respiratory: Negative.    Cardiovascular: Negative.   "  Gastrointestinal: Negative.    Endocrine: Negative.    Genitourinary: Negative.    Musculoskeletal: Positive for arthralgias.   Skin: Negative.    Allergic/Immunologic: Negative.    Neurological: Negative.    Hematological: Negative.    Psychiatric/Behavioral: Negative.         Physical Exam  Temperature 97.7 °F (36.5 °C), height 175.3 cm (69.02\"), weight 88.9 kg (195 lb 15.8 oz).    Body mass index is 28.93 kg/m².    GENERAL APPEARANCE: awake, alert, oriented, in no acute distress and well developed, well nourished  LUNGS:  breathing nonlabored  EXTREMITIES: no clubbing, cyanosis  PERIPHERAL PULSES: palpable dorsalis pedis and posterior tibial pulses bilaterally.    GAIT:  Antalgic            Hip Exam:  Right    RANGE OF MOTION:  EXTENSION/FLEXION:  normal (0-110 degrees)  IR:  20  ER:  35  PAIN WITH HIP MOTION:  no  PAIN WITH LOGROLL:  no     STRENGTH:  ABDUCTOR:  5/5  ADDUCTOR:  5/5  HIP FLEXION:  5/5    GREATER TROCHANTER BURSAL PAIN:  no    SENSATION TO LIGHT TOUCH:  DEEP PERONEAL/SUPERFICIAL PERONEAL/SURAL/SAPHENOUS/TIBIAL:   intact    EDEMA:  no  ERYTHEMA:  no  WOUNDS/INCISIONS:   yes, well healed surgical incision without evidence of erythema or drainage  -------------  Hip Exam:   Left     RANGE OF MOTION:  EXTENSION/FLEXION:  normal (0-110 degrees)  IR (at 90 degrees of flexion):  neutral  ER (at 90 degrees of flexion):  20  PAIN WITH HIP MOTION:  yes, Localized to groin   PAIN WITH LOGROLL:  no      STINCHFIELD TEST: positive     STRENGTH:  ABDUCTOR:    4/5  ADDUCTOR:  5/5  HIP FLEXION:  5/5     GREATER TROCHANTER BURSAL PAIN:  yes    SENSATION TO LIGHT TOUCH:  DEEP PERONEAL/SUPERFICIAL PERONEAL/SURAL/SAPHENOUS/TIBIAL:   intact    EDEMA:  no  ERYTHEMA:  no  WOUNDS/INCISIONS:   no  _______________________________________________________________  _______________________________________________________________    RADIOGRAPHIC FINDINGS:   Indication: Status post right total hip arthroplasty, left hip " pain    Comparison: Todays xrays were compared to previous xrays from 9/12/2022    AP pelvis: Right: Demonstrate a well positioned total hip without evidence of wear, loosening, fracture or osteolysis, femoral head is concentrically reduced within the acetabulum and No significant changes compared to prior radiographs.;Left: advanced, end-stage osteoarthritis with bone on bone articulation, subchondral sclerosis with questionable subchondral collapse, and subchondral cysts, there are marginal osteophytes visualized at the femoral head-neck junction and acetabular margins and No significant changes compared to prior radiographs.        Assessment/Plan:   Diagnosis Plan   1. Primary osteoarthritis of left hip  Case Request    CBC and Differential    Comprehensive metabolic panel    Protime-INR    APTT    Hemoglobin A1c    ECG 12 Lead    Nicotine & Metabolite, Quant    Case Request   2. Surgery follow-up  XR Pelvis 1 or 2 View     Patient doing well 3-week status post right total hip arthroplasty.  At this time we will proceed with scheduling left hip replacement.    The patient has clinical and radiographic evidence of end-stage left hip joint degeneration. Conservative measures have been tried for 3 months or longer, but have failed to adequately treat or improve the patient's symptoms. Pain is restricting the patient's daily activities as well as quality of life. The recommendation at this time is to proceed with a left total hip arthroplasty with the goal to improve patient function and pain. The risks, benefits, potential complications, and alternatives were discussed with the patient in detail. Risks included but were not limited to bleeding, infection, anesthesia risks, damage to neurovascular structures, osteolysis, aseptic loosening, instability, dislocation, pain, continued pain, iatrogenic fracture, leg length discrepancy, possible need for future surgery including the potential for amputation, blood clots,  myocardial infarction, stroke, and death. Erica-operative blood management and the potential for blood transfusion were discussed with risks and options clearly outlined. Specific details of the surgical procedure, hospitalization, recovery, rehabilitation, and long-term precautions were also presented. Pre-operative teaching was provided. Implant/prosthesis selection was outlined, and the many options available were explained; the final choice will be made at the time of the procedure to match the anatomy and condition of the bone, ligaments, tendons, and muscles. Given this instruction, the patient elected to proceed with the left total hip arthroplasty. The patient will be seen by pre-admission testing for pre-operative optimization and risk assessment and will be scheduled for surgery once this is completed.    The patient is considered standard risk for DVT based on patient risk factors and will be placed on aspirin postoperatively for DVT prophylaxis.      Davin Swenson MD  10/04/22  14:50 EDT

## 2022-10-10 ENCOUNTER — PRE-ADMISSION TESTING (OUTPATIENT)
Dept: PREADMISSION TESTING | Facility: HOSPITAL | Age: 67
End: 2022-10-10

## 2022-10-10 VITALS — WEIGHT: 196.54 LBS | BODY MASS INDEX: 29.11 KG/M2 | HEIGHT: 69 IN

## 2022-10-10 DIAGNOSIS — M16.12 PRIMARY OSTEOARTHRITIS OF LEFT HIP: ICD-10-CM

## 2022-10-10 LAB
ALBUMIN SERPL-MCNC: 4.5 G/DL (ref 3.5–5.2)
ALBUMIN/GLOB SERPL: 1.8 G/DL
ALP SERPL-CCNC: 95 U/L (ref 39–117)
ALT SERPL W P-5'-P-CCNC: 18 U/L (ref 1–41)
ANION GAP SERPL CALCULATED.3IONS-SCNC: 9 MMOL/L (ref 5–15)
APTT PPP: 29.6 SECONDS (ref 22–39)
AST SERPL-CCNC: 17 U/L (ref 1–40)
BASOPHILS # BLD AUTO: 0.04 10*3/MM3 (ref 0–0.2)
BASOPHILS NFR BLD AUTO: 0.5 % (ref 0–1.5)
BILIRUB SERPL-MCNC: 0.4 MG/DL (ref 0–1.2)
BUN SERPL-MCNC: 18 MG/DL (ref 8–23)
BUN/CREAT SERPL: 17 (ref 7–25)
CALCIUM SPEC-SCNC: 9.9 MG/DL (ref 8.6–10.5)
CHLORIDE SERPL-SCNC: 105 MMOL/L (ref 98–107)
CO2 SERPL-SCNC: 27 MMOL/L (ref 22–29)
CREAT SERPL-MCNC: 1.06 MG/DL (ref 0.76–1.27)
DEPRECATED RDW RBC AUTO: 44.6 FL (ref 37–54)
EGFRCR SERPLBLD CKD-EPI 2021: 76.4 ML/MIN/1.73
EOSINOPHIL # BLD AUTO: 0.59 10*3/MM3 (ref 0–0.4)
EOSINOPHIL NFR BLD AUTO: 7.8 % (ref 0.3–6.2)
ERYTHROCYTE [DISTWIDTH] IN BLOOD BY AUTOMATED COUNT: 12.6 % (ref 12.3–15.4)
GLOBULIN UR ELPH-MCNC: 2.5 GM/DL
GLUCOSE SERPL-MCNC: 89 MG/DL (ref 65–99)
HBA1C MFR BLD: 4.8 % (ref 4.8–5.6)
HCT VFR BLD AUTO: 41.2 % (ref 37.5–51)
HGB BLD-MCNC: 14.2 G/DL (ref 13–17.7)
IMM GRANULOCYTES # BLD AUTO: 0.01 10*3/MM3 (ref 0–0.05)
IMM GRANULOCYTES NFR BLD AUTO: 0.1 % (ref 0–0.5)
INR PPP: 0.98 (ref 0.84–1.13)
LYMPHOCYTES # BLD AUTO: 1.53 10*3/MM3 (ref 0.7–3.1)
LYMPHOCYTES NFR BLD AUTO: 20.3 % (ref 19.6–45.3)
MCH RBC QN AUTO: 33.1 PG (ref 26.6–33)
MCHC RBC AUTO-ENTMCNC: 34.5 G/DL (ref 31.5–35.7)
MCV RBC AUTO: 96 FL (ref 79–97)
MONOCYTES # BLD AUTO: 1.07 10*3/MM3 (ref 0.1–0.9)
MONOCYTES NFR BLD AUTO: 14.2 % (ref 5–12)
NEUTROPHILS NFR BLD AUTO: 4.3 10*3/MM3 (ref 1.7–7)
NEUTROPHILS NFR BLD AUTO: 57.1 % (ref 42.7–76)
NRBC BLD AUTO-RTO: 0 /100 WBC (ref 0–0.2)
PLATELET # BLD AUTO: 214 10*3/MM3 (ref 140–450)
PMV BLD AUTO: 9.1 FL (ref 6–12)
POTASSIUM SERPL-SCNC: 4.4 MMOL/L (ref 3.5–5.2)
PROT SERPL-MCNC: 7 G/DL (ref 6–8.5)
PROTHROMBIN TIME: 12.9 SECONDS (ref 11.4–14.4)
RBC # BLD AUTO: 4.29 10*6/MM3 (ref 4.14–5.8)
SODIUM SERPL-SCNC: 141 MMOL/L (ref 136–145)
WBC NRBC COR # BLD: 7.54 10*3/MM3 (ref 3.4–10.8)

## 2022-10-10 PROCEDURE — G0480 DRUG TEST DEF 1-7 CLASSES: HCPCS

## 2022-10-10 PROCEDURE — 85610 PROTHROMBIN TIME: CPT

## 2022-10-10 PROCEDURE — 85025 COMPLETE CBC W/AUTO DIFF WBC: CPT

## 2022-10-10 PROCEDURE — 85730 THROMBOPLASTIN TIME PARTIAL: CPT

## 2022-10-10 PROCEDURE — 83036 HEMOGLOBIN GLYCOSYLATED A1C: CPT

## 2022-10-10 PROCEDURE — 80053 COMPREHEN METABOLIC PANEL: CPT

## 2022-10-10 PROCEDURE — 36415 COLL VENOUS BLD VENIPUNCTURE: CPT

## 2022-10-10 RX ORDER — SENNOSIDES 8.6 MG
650 CAPSULE ORAL EVERY 8 HOURS PRN
COMMUNITY
End: 2022-12-08

## 2022-10-11 ENCOUNTER — READMISSION MANAGEMENT (OUTPATIENT)
Dept: CALL CENTER | Facility: HOSPITAL | Age: 67
End: 2022-10-11

## 2022-10-11 NOTE — OUTREACH NOTE
Total Joint Month 1 Survey    Flowsheet Row Responses   [facility] patient discharged from? [place]   Does the patient have one of the following disease processes/diagnoses(primary or secondary)? Total Joint Replacement   Joint surgery performed? Hip   Month 1 attempt successful? Yes   Call start time 1345   Call end time 1347   Has the patient been back in either the hospital or Emergency Department since discharge? No   Person spoke with today (if not patient) and relationship patient   Is the patient taking all medications as directed (includes completed medication regime)? Yes   Has the patient kept scheduled appointments due by today? Yes   Comments Pt is doing therapy 2x per week.   Is the patient still receiving Home Health Services? N/A   Is the patient still attending therapy sessions(either in the home or as an outpatient)? Yes   Has the patient fallen since discharge? No   If the patient has fallen, were there any injuries? No   What is the patient's perception of their functional status since discharge? Improving   Is the patient/caregiver able to teach back the hierarchy of who to call/visit for symptoms/problems? PCP, Specialist, Home health nurse, Urgent Care, ED, 911 Yes   Month 1 call completed? Yes   Revoked No further contact(revokes)-requires comment   Wrap up additional comments Pt is doing well.  He is getting the other hip replaced this coming monday          ROSALINA MCHUGH - Registered Nurse

## 2022-10-17 ENCOUNTER — HOSPITAL ENCOUNTER (OUTPATIENT)
Facility: HOSPITAL | Age: 67
Discharge: HOME | End: 2022-10-17
Attending: ORTHOPAEDIC SURGERY | Admitting: ORTHOPAEDIC SURGERY
Payer: MEDICARE

## 2022-10-17 ENCOUNTER — ANESTHESIA (OUTPATIENT)
Dept: PERIOP | Facility: HOSPITAL | Age: 67
End: 2022-10-17

## 2022-10-17 ENCOUNTER — APPOINTMENT (OUTPATIENT)
Dept: GENERAL RADIOLOGY | Facility: HOSPITAL | Age: 67
End: 2022-10-17
Payer: MEDICARE

## 2022-10-17 ENCOUNTER — READMISSION MANAGEMENT (OUTPATIENT)
Dept: CALL CENTER | Facility: HOSPITAL | Age: 67
End: 2022-10-17

## 2022-10-17 ENCOUNTER — ANESTHESIA EVENT (OUTPATIENT)
Dept: PERIOP | Facility: HOSPITAL | Age: 67
End: 2022-10-17

## 2022-10-17 VITALS
HEIGHT: 69 IN | OXYGEN SATURATION: 95 % | SYSTOLIC BLOOD PRESSURE: 128 MMHG | HEART RATE: 80 BPM | TEMPERATURE: 96.2 F | BODY MASS INDEX: 29.03 KG/M2 | RESPIRATION RATE: 16 BRPM | DIASTOLIC BLOOD PRESSURE: 84 MMHG | WEIGHT: 196 LBS

## 2022-10-17 DIAGNOSIS — M16.12 PRIMARY OSTEOARTHRITIS OF LEFT HIP: ICD-10-CM

## 2022-10-17 DIAGNOSIS — Z96.642 STATUS POST TOTAL HIP REPLACEMENT, LEFT: Primary | ICD-10-CM

## 2022-10-17 LAB — GLUCOSE BLDC GLUCOMTR-MCNC: 79 MG/DL (ref 70–130)

## 2022-10-17 PROCEDURE — 25010000002 ONDANSETRON PER 1 MG: Performed by: ANESTHESIOLOGY

## 2022-10-17 PROCEDURE — C1755 CATHETER, INTRASPINAL: HCPCS | Performed by: ORTHOPAEDIC SURGERY

## 2022-10-17 PROCEDURE — 25010000002 KETOROLAC TROMETHAMINE PER 15 MG: Performed by: ORTHOPAEDIC SURGERY

## 2022-10-17 PROCEDURE — 25010000002 CEFAZOLIN IN DEXTROSE 2-4 GM/100ML-% SOLUTION: Performed by: ORTHOPAEDIC SURGERY

## 2022-10-17 PROCEDURE — C1713 ANCHOR/SCREW BN/BN,TIS/BN: HCPCS | Performed by: ORTHOPAEDIC SURGERY

## 2022-10-17 PROCEDURE — C1776 JOINT DEVICE (IMPLANTABLE): HCPCS | Performed by: ORTHOPAEDIC SURGERY

## 2022-10-17 PROCEDURE — 25010000002 ROPIVACAINE PER 1 MG: Performed by: ORTHOPAEDIC SURGERY

## 2022-10-17 PROCEDURE — G0378 HOSPITAL OBSERVATION PER HR: HCPCS

## 2022-10-17 PROCEDURE — 25010000002 DEXAMETHASONE PER 1 MG: Performed by: ANESTHESIOLOGY

## 2022-10-17 PROCEDURE — 97165 OT EVAL LOW COMPLEX 30 MIN: CPT

## 2022-10-17 PROCEDURE — 25010000002 MORPHINE PER 10 MG: Performed by: ORTHOPAEDIC SURGERY

## 2022-10-17 PROCEDURE — 97110 THERAPEUTIC EXERCISES: CPT

## 2022-10-17 PROCEDURE — 72170 X-RAY EXAM OF PELVIS: CPT

## 2022-10-17 PROCEDURE — 25010000002 FENTANYL CITRATE (PF) 50 MCG/ML SOLUTION: Performed by: ANESTHESIOLOGY

## 2022-10-17 PROCEDURE — 82962 GLUCOSE BLOOD TEST: CPT

## 2022-10-17 PROCEDURE — 27130 TOTAL HIP ARTHROPLASTY: CPT | Performed by: ORTHOPAEDIC SURGERY

## 2022-10-17 PROCEDURE — 97535 SELF CARE MNGMENT TRAINING: CPT

## 2022-10-17 PROCEDURE — 25010000002 PROPOFOL 10 MG/ML EMULSION: Performed by: ANESTHESIOLOGY

## 2022-10-17 PROCEDURE — 97161 PT EVAL LOW COMPLEX 20 MIN: CPT

## 2022-10-17 PROCEDURE — 27130 TOTAL HIP ARTHROPLASTY: CPT | Performed by: PHYSICIAN ASSISTANT

## 2022-10-17 DEVICE — DEV CONTRL TISS STRATAFIX SPIRAL PDO BIDIR MO4 36X36CM: Type: IMPLANTABLE DEVICE | Site: HIP | Status: FUNCTIONAL

## 2022-10-17 DEVICE — TOTL HIP HI DEMAND STRYKER: Type: IMPLANTABLE DEVICE | Site: HIP | Status: FUNCTIONAL

## 2022-10-17 DEVICE — TRIDENT II TRITANIUM CLUSTER 52E
Type: IMPLANTABLE DEVICE | Site: HIP | Status: FUNCTIONAL
Brand: TRIDENT II

## 2022-10-17 DEVICE — 6.5MM LOW PROFILE HEX SCREW 25MM
Type: IMPLANTABLE DEVICE | Site: HIP | Status: FUNCTIONAL
Brand: TRIDENT II

## 2022-10-17 DEVICE — 6.5MM LOW PROFILE HEX SCREW 35MM
Type: IMPLANTABLE DEVICE | Site: HIP | Status: FUNCTIONAL
Brand: TRIDENT II

## 2022-10-17 DEVICE — 127 DEGREE NECK ANGLE HIP STEM
Type: IMPLANTABLE DEVICE | Site: HIP | Status: FUNCTIONAL
Brand: ACCOLADE

## 2022-10-17 DEVICE — CERAMIC V40 FEMORAL HEAD
Type: IMPLANTABLE DEVICE | Site: HIP | Status: FUNCTIONAL
Brand: BIOLOX

## 2022-10-17 DEVICE — WAX BONE HEMO AESCULAP 2.5GM: Type: IMPLANTABLE DEVICE | Site: HIP | Status: FUNCTIONAL

## 2022-10-17 DEVICE — DEV CONTRL TISS STRATAFIX SPIRAL PDO BIDIR 1 36X36CM: Type: IMPLANTABLE DEVICE | Site: HIP | Status: FUNCTIONAL

## 2022-10-17 DEVICE — IMPLANTABLE DEVICE: Type: IMPLANTABLE DEVICE | Site: HIP | Status: FUNCTIONAL

## 2022-10-17 RX ORDER — LIDOCAINE HYDROCHLORIDE 10 MG/ML
0.5 INJECTION, SOLUTION EPIDURAL; INFILTRATION; INTRACAUDAL; PERINEURAL ONCE AS NEEDED
Status: DISCONTINUED | OUTPATIENT
Start: 2022-10-17 | End: 2022-10-17

## 2022-10-17 RX ORDER — CEFAZOLIN SODIUM 2 G/100ML
2 INJECTION, SOLUTION INTRAVENOUS EVERY 8 HOURS
Status: DISCONTINUED | OUTPATIENT
Start: 2022-10-17 | End: 2022-10-17 | Stop reason: HOSPADM

## 2022-10-17 RX ORDER — MIDAZOLAM HYDROCHLORIDE 1 MG/ML
0.5 INJECTION INTRAMUSCULAR; INTRAVENOUS
Status: DISCONTINUED | OUTPATIENT
Start: 2022-10-17 | End: 2022-10-17 | Stop reason: HOSPADM

## 2022-10-17 RX ORDER — ONDANSETRON 2 MG/ML
4 INJECTION INTRAMUSCULAR; INTRAVENOUS EVERY 6 HOURS PRN
Status: DISCONTINUED | OUTPATIENT
Start: 2022-10-17 | End: 2022-10-17 | Stop reason: HOSPADM

## 2022-10-17 RX ORDER — LIDOCAINE HYDROCHLORIDE 10 MG/ML
0.5 INJECTION, SOLUTION EPIDURAL; INFILTRATION; INTRACAUDAL; PERINEURAL ONCE AS NEEDED
Status: COMPLETED | OUTPATIENT
Start: 2022-10-17 | End: 2022-10-17

## 2022-10-17 RX ORDER — ACETAMINOPHEN 500 MG
1000 TABLET ORAL EVERY 8 HOURS SCHEDULED
Status: DISCONTINUED | OUTPATIENT
Start: 2022-10-17 | End: 2022-10-17 | Stop reason: HOSPADM

## 2022-10-17 RX ORDER — BUPIVACAINE HYDROCHLORIDE 5 MG/ML
INJECTION, SOLUTION PERINEURAL
Status: COMPLETED | OUTPATIENT
Start: 2022-10-17 | End: 2022-10-17

## 2022-10-17 RX ORDER — MAGNESIUM HYDROXIDE 1200 MG/15ML
LIQUID ORAL AS NEEDED
Status: DISCONTINUED | OUTPATIENT
Start: 2022-10-17 | End: 2022-10-17 | Stop reason: HOSPADM

## 2022-10-17 RX ORDER — SODIUM CHLORIDE 0.9 % (FLUSH) 0.9 %
10 SYRINGE (ML) INJECTION AS NEEDED
Status: DISCONTINUED | OUTPATIENT
Start: 2022-10-17 | End: 2022-10-17

## 2022-10-17 RX ORDER — DEXAMETHASONE SODIUM PHOSPHATE 4 MG/ML
INJECTION, SOLUTION INTRA-ARTICULAR; INTRALESIONAL; INTRAMUSCULAR; INTRAVENOUS; SOFT TISSUE AS NEEDED
Status: DISCONTINUED | OUTPATIENT
Start: 2022-10-17 | End: 2022-10-17 | Stop reason: SURG

## 2022-10-17 RX ORDER — POLYETHYLENE GLYCOL 3350 17 G/17G
17 POWDER, FOR SOLUTION ORAL DAILY
Qty: 15 PACKET | Refills: 0 | Status: SHIPPED | OUTPATIENT
Start: 2022-10-17 | End: 2022-11-01

## 2022-10-17 RX ORDER — NALOXONE HCL 0.4 MG/ML
0.1 VIAL (ML) INJECTION
Status: DISCONTINUED | OUTPATIENT
Start: 2022-10-17 | End: 2022-10-17 | Stop reason: HOSPADM

## 2022-10-17 RX ORDER — LABETALOL HYDROCHLORIDE 5 MG/ML
10 INJECTION, SOLUTION INTRAVENOUS EVERY 4 HOURS PRN
Status: DISCONTINUED | OUTPATIENT
Start: 2022-10-17 | End: 2022-10-17 | Stop reason: HOSPADM

## 2022-10-17 RX ORDER — SODIUM CHLORIDE, SODIUM LACTATE, POTASSIUM CHLORIDE, CALCIUM CHLORIDE 600; 310; 30; 20 MG/100ML; MG/100ML; MG/100ML; MG/100ML
9 INJECTION, SOLUTION INTRAVENOUS CONTINUOUS PRN
Status: DISCONTINUED | OUTPATIENT
Start: 2022-10-17 | End: 2022-10-17

## 2022-10-17 RX ORDER — ASPIRIN 81 MG/1
81 TABLET ORAL 2 TIMES DAILY
Qty: 60 TABLET | Refills: 0 | Status: SHIPPED | OUTPATIENT
Start: 2022-10-18 | End: 2022-12-08

## 2022-10-17 RX ORDER — OXYCODONE HYDROCHLORIDE 5 MG/1
5 TABLET ORAL EVERY 4 HOURS PRN
Qty: 40 TABLET | Refills: 0 | Status: SHIPPED | OUTPATIENT
Start: 2022-10-17 | End: 2022-10-26

## 2022-10-17 RX ORDER — MELOXICAM 15 MG/1
15 TABLET ORAL ONCE
Status: COMPLETED | OUTPATIENT
Start: 2022-10-17 | End: 2022-10-17

## 2022-10-17 RX ORDER — MIDAZOLAM HYDROCHLORIDE 1 MG/ML
0.5 INJECTION INTRAMUSCULAR; INTRAVENOUS
Status: DISCONTINUED | OUTPATIENT
Start: 2022-10-17 | End: 2022-10-17

## 2022-10-17 RX ORDER — MELOXICAM 7.5 MG/1
15 TABLET ORAL DAILY
Status: DISCONTINUED | OUTPATIENT
Start: 2022-10-18 | End: 2022-10-17 | Stop reason: HOSPADM

## 2022-10-17 RX ORDER — BUPIVACAINE HCL/0.9 % NACL/PF 0.125 %
PLASTIC BAG, INJECTION (ML) EPIDURAL AS NEEDED
Status: DISCONTINUED | OUTPATIENT
Start: 2022-10-17 | End: 2022-10-17 | Stop reason: SURG

## 2022-10-17 RX ORDER — SODIUM CHLORIDE 9 MG/ML
100 INJECTION, SOLUTION INTRAVENOUS CONTINUOUS
Status: DISCONTINUED | OUTPATIENT
Start: 2022-10-17 | End: 2022-10-17 | Stop reason: HOSPADM

## 2022-10-17 RX ORDER — SODIUM CHLORIDE, SODIUM LACTATE, POTASSIUM CHLORIDE, CALCIUM CHLORIDE 600; 310; 30; 20 MG/100ML; MG/100ML; MG/100ML; MG/100ML
9 INJECTION, SOLUTION INTRAVENOUS CONTINUOUS
Status: DISCONTINUED | OUTPATIENT
Start: 2022-10-17 | End: 2022-10-17 | Stop reason: HOSPADM

## 2022-10-17 RX ORDER — SODIUM CHLORIDE 0.9 % (FLUSH) 0.9 %
10 SYRINGE (ML) INJECTION EVERY 12 HOURS SCHEDULED
Status: DISCONTINUED | OUTPATIENT
Start: 2022-10-17 | End: 2022-10-17

## 2022-10-17 RX ORDER — FAMOTIDINE 20 MG/1
20 TABLET, FILM COATED ORAL
Status: COMPLETED | OUTPATIENT
Start: 2022-10-17 | End: 2022-10-17

## 2022-10-17 RX ORDER — ONDANSETRON 4 MG/1
4 TABLET, FILM COATED ORAL EVERY 6 HOURS PRN
Status: DISCONTINUED | OUTPATIENT
Start: 2022-10-17 | End: 2022-10-17 | Stop reason: HOSPADM

## 2022-10-17 RX ORDER — SODIUM CHLORIDE 0.9 % (FLUSH) 0.9 %
10 SYRINGE (ML) INJECTION EVERY 12 HOURS SCHEDULED
Status: DISCONTINUED | OUTPATIENT
Start: 2022-10-17 | End: 2022-10-17 | Stop reason: HOSPADM

## 2022-10-17 RX ORDER — SODIUM CHLORIDE 0.9 % (FLUSH) 0.9 %
10 SYRINGE (ML) INJECTION AS NEEDED
Status: DISCONTINUED | OUTPATIENT
Start: 2022-10-17 | End: 2022-10-17 | Stop reason: HOSPADM

## 2022-10-17 RX ORDER — TRANEXAMIC ACID 10 MG/ML
1000 INJECTION, SOLUTION INTRAVENOUS ONCE
Status: DISCONTINUED | OUTPATIENT
Start: 2022-10-17 | End: 2022-10-17 | Stop reason: HOSPADM

## 2022-10-17 RX ORDER — HYDROMORPHONE HYDROCHLORIDE 1 MG/ML
0.5 INJECTION, SOLUTION INTRAMUSCULAR; INTRAVENOUS; SUBCUTANEOUS
Status: DISCONTINUED | OUTPATIENT
Start: 2022-10-17 | End: 2022-10-17 | Stop reason: HOSPADM

## 2022-10-17 RX ORDER — PREGABALIN 75 MG/1
75 CAPSULE ORAL ONCE
Status: COMPLETED | OUTPATIENT
Start: 2022-10-17 | End: 2022-10-17

## 2022-10-17 RX ORDER — TRANEXAMIC ACID 10 MG/ML
1000 INJECTION, SOLUTION INTRAVENOUS ONCE
Status: COMPLETED | OUTPATIENT
Start: 2022-10-17 | End: 2022-10-17

## 2022-10-17 RX ORDER — EPHEDRINE SULFATE 50 MG/ML
INJECTION, SOLUTION INTRAVENOUS AS NEEDED
Status: DISCONTINUED | OUTPATIENT
Start: 2022-10-17 | End: 2022-10-17 | Stop reason: SURG

## 2022-10-17 RX ORDER — ACETAMINOPHEN 500 MG
1000 TABLET ORAL ONCE
Status: COMPLETED | OUTPATIENT
Start: 2022-10-17 | End: 2022-10-17

## 2022-10-17 RX ORDER — OXYCODONE HYDROCHLORIDE 5 MG/1
10 TABLET ORAL EVERY 4 HOURS PRN
Status: DISCONTINUED | OUTPATIENT
Start: 2022-10-17 | End: 2022-10-17 | Stop reason: HOSPADM

## 2022-10-17 RX ORDER — CEFAZOLIN SODIUM 2 G/100ML
2 INJECTION, SOLUTION INTRAVENOUS ONCE
Status: COMPLETED | OUTPATIENT
Start: 2022-10-17 | End: 2022-10-17

## 2022-10-17 RX ORDER — OXYCODONE HYDROCHLORIDE 5 MG/1
5 TABLET ORAL EVERY 4 HOURS PRN
Status: DISCONTINUED | OUTPATIENT
Start: 2022-10-17 | End: 2022-10-17 | Stop reason: HOSPADM

## 2022-10-17 RX ORDER — FENTANYL CITRATE 50 UG/ML
INJECTION, SOLUTION INTRAMUSCULAR; INTRAVENOUS AS NEEDED
Status: DISCONTINUED | OUTPATIENT
Start: 2022-10-17 | End: 2022-10-17 | Stop reason: SURG

## 2022-10-17 RX ORDER — ONDANSETRON 2 MG/ML
INJECTION INTRAMUSCULAR; INTRAVENOUS AS NEEDED
Status: DISCONTINUED | OUTPATIENT
Start: 2022-10-17 | End: 2022-10-17 | Stop reason: SURG

## 2022-10-17 RX ADMIN — TRANEXAMIC ACID 1000 MG: 10 INJECTION, SOLUTION INTRAVENOUS at 11:28

## 2022-10-17 RX ADMIN — ONDANSETRON 4 MG: 2 INJECTION INTRAMUSCULAR; INTRAVENOUS at 11:52

## 2022-10-17 RX ADMIN — ACETAMINOPHEN 1000 MG: 500 TABLET, FILM COATED ORAL at 08:26

## 2022-10-17 RX ADMIN — EPHEDRINE SULFATE 10 MG: 50 INJECTION INTRAVENOUS at 11:29

## 2022-10-17 RX ADMIN — ACETAMINOPHEN 1000 MG: 500 TABLET ORAL at 13:55

## 2022-10-17 RX ADMIN — PROPOFOL 100 MCG/KG/MIN: 10 INJECTION, EMULSION INTRAVENOUS at 10:06

## 2022-10-17 RX ADMIN — Medication 100 MCG: at 10:27

## 2022-10-17 RX ADMIN — CEFAZOLIN SODIUM 2 G: 2 INJECTION, SOLUTION INTRAVENOUS at 17:08

## 2022-10-17 RX ADMIN — BUPIVACAINE HYDROCHLORIDE 1.8 ML: 5 INJECTION, SOLUTION PERINEURAL at 10:11

## 2022-10-17 RX ADMIN — PREGABALIN 75 MG: 75 CAPSULE ORAL at 08:26

## 2022-10-17 RX ADMIN — Medication 100 MCG: at 11:15

## 2022-10-17 RX ADMIN — Medication 100 MCG: at 11:29

## 2022-10-17 RX ADMIN — LIDOCAINE HYDROCHLORIDE 0.5 ML: 10 INJECTION, SOLUTION EPIDURAL; INFILTRATION; INTRACAUDAL; PERINEURAL at 08:26

## 2022-10-17 RX ADMIN — Medication 100 MCG: at 10:38

## 2022-10-17 RX ADMIN — FAMOTIDINE 20 MG: 20 TABLET ORAL at 08:26

## 2022-10-17 RX ADMIN — TRANEXAMIC ACID 1000 MG: 10 INJECTION, SOLUTION INTRAVENOUS at 10:18

## 2022-10-17 RX ADMIN — Medication 100 MCG: at 10:45

## 2022-10-17 RX ADMIN — EPHEDRINE SULFATE 10 MG: 50 INJECTION INTRAVENOUS at 10:57

## 2022-10-17 RX ADMIN — EPHEDRINE SULFATE 10 MG: 50 INJECTION INTRAVENOUS at 10:53

## 2022-10-17 RX ADMIN — Medication 100 MCG: at 10:52

## 2022-10-17 RX ADMIN — CEFAZOLIN SODIUM 2 G: 2 INJECTION, SOLUTION INTRAVENOUS at 10:16

## 2022-10-17 RX ADMIN — SODIUM CHLORIDE, POTASSIUM CHLORIDE, SODIUM LACTATE AND CALCIUM CHLORIDE: 600; 310; 30; 20 INJECTION, SOLUTION INTRAVENOUS at 10:06

## 2022-10-17 RX ADMIN — SODIUM CHLORIDE 100 ML/HR: 9 INJECTION, SOLUTION INTRAVENOUS at 13:56

## 2022-10-17 RX ADMIN — MUPIROCIN 1 APPLICATION: 20 OINTMENT TOPICAL at 08:26

## 2022-10-17 RX ADMIN — Medication 100 MCG: at 11:09

## 2022-10-17 RX ADMIN — FENTANYL CITRATE 50 MCG: 50 INJECTION, SOLUTION INTRAMUSCULAR; INTRAVENOUS at 10:34

## 2022-10-17 RX ADMIN — MELOXICAM 15 MG: 15 TABLET ORAL at 08:26

## 2022-10-17 RX ADMIN — SODIUM CHLORIDE, POTASSIUM CHLORIDE, SODIUM LACTATE AND CALCIUM CHLORIDE 9 ML/HR: 600; 310; 30; 20 INJECTION, SOLUTION INTRAVENOUS at 08:26

## 2022-10-17 RX ADMIN — FENTANYL CITRATE 50 MCG: 50 INJECTION, SOLUTION INTRAMUSCULAR; INTRAVENOUS at 10:20

## 2022-10-17 RX ADMIN — DEXAMETHASONE SODIUM PHOSPHATE 4 MG: 4 INJECTION, SOLUTION INTRA-ARTICULAR; INTRALESIONAL; INTRAMUSCULAR; INTRAVENOUS; SOFT TISSUE at 10:18

## 2022-10-17 NOTE — OP NOTE
OPERATIVE REPORT     DATE OF PROCEDURE: 10/17/2022    SURGEON: Davin Swenson M.D.     ASSISTANT(S): Circulator: Rhiannon Lorenzo RN; Jeanna Martinez RN  Radiology Technologist: Rubens Mcbride RT  Scrub Person: Modesta Swanson; Don Brizuela  Vendor Representative: Cruz De Dios; Delgado Sales  Nursing Assistant: Halley Urias; Jhonatan Garcia PCT  Assistant: Nanci Berger PA-C  Assistant: Nanci Berger PA-C    Note-PA was utilized during the case to facilitate positioning the patient, exposure, retraction, placement of final components and definitive closure.    PREOPERATIVE DIAGNOSIS: Advanced degenerative joint disease of the left hip secondary to osteoarthritis    POSTOPERATIVE DIAGNOSIS: same     PROCEDURE: Left total Hip Arthroplasty     SURGICAL DETAILS:     APPROACH: Posterior    ANESTHESIA: Spinal plus local periarticular block    PREOPERATIVE ANTIBIOTICS: Ancef 2 g IV    TRANEXAMIC ACID: IV    ESTIMATED BLOOD LOSS: 300 cc     SPECIMENS: None    IMPLANTS:   : Isa  Acetabular component: 52 mm Trident 2   Acetabular screws: 2  Acetabular liner: 0 degree eccentric X3   Femoral component: Accolade  degree size 6  Femoral head: 36+5 mm Biolox delta ceramic     DRAINS: None    LOCAL INJECTION: 1 cc Toradol 30mg/ml, 4 cc duramorph 2mg/ml, 20 cc 0.5% ropivicaine, 20 cc 0.5% lidocaine with 1:200,000 epinephrine, 15 cc preservative free normal saline     MODIFIER(S): None    COMPLICATIONS: None apparent    INDICATIONS FOR PROCEDURE: This patient has a history of progressive left hip pain and arthritis. The hip pain is severe with activity and has progressed significantly. Non-operative treatment has been attempted, but has not improved or controlled symptoms during normal daily activities. Motion has become limited and rotation severely restricted. X-rays reveal moderate-to-severe eburnation of articular cartilage on the superior weight bearing surface  of the hip with circumferential acetabular and femoral neck osteophytes consistent with advanced hip osteoarthritis. A total hip arthroplasty was recommended at this time. The risks, benefits, alternatives, and potential complications of the arthroplasty surgery were discussed with the patient in detail to include but not limited to infection, bleeding, anesthesia risks, sciatic nerve palsy, instability/dislocation, limb length discrepancy, aseptic loosening, osteolysis, blood clots, continued pain, iatrogenic fracture, myocardial infarction, stroke, and death. Specific details of the procedure, hospitalization, recovery, rehabilitation, and long-term precautions were also provided. Pre-operative teaching was provided. Implant/prosthesis selection was outlined, and the many options available were explained; the final choice will be made at the time of the procedure to match the anatomy and condition of the bone, ligaments, tendons, and muscles. Understanding of all topics was conveyed to me by the patient, and consent was given to proceed with a left total hip arthroplasty. The patient completed preoperative medical optimization and risk assessment, joint arthroplasty education, and MRSA decolonization using a universal decolonization protocol. Perioperative blood management and the potential for blood transfusion were discussed with risks and options clearly outlined.     INTRAOPERATIVE FINDINGS: End-stage osteoarthritis left hip    PROCEDURE: The patient was identified in the preoperative holding area. The operative site was confirmed and marked. A sequential compression device was placed on the nonoperative leg. The risks, benefits, and alternatives to surgery were again confirmed with the patient and the patient wished to proceed. The patient was brought to the operating room and placed on the operating room table in the supine position. A huddle was performed with the patient and all vital surgical team members  to confirm the correct operative site, procedure, anesthesia type, and operative plan with the patient. After anesthesia was performed, the patient was positioned in the lateral decubitus position on the pegboard and secured with the operative side up. An axillary roll was placed in the axilla and all bony prominences and pressure points were checked and padded. A relative leg length assessment was carried out and markers were placed for intraoperative assessment. Intravenous antibiotic prophylaxis was given and confirmed with the anesthesia team.     The operative leg was prepped and draped in the usual sterile fashion. A surgical time out was performed immediately preceding the incision with all personnel in the operating room to again confirm patient identity, the correct operative site and extremity, correct radiographic studies, availability of appropriate surgical equipment and agreement on the planned procedure. A posterolateral approach to the hip was performed through an incision centered over the greater trochanter. The incision was carried through the subcutaneous tissue to the underlying fascia sheryl and gluteus landen fascia, which were incised and split posteriorly over the trochanter in the direction of the fibers. Hemostasis was obtained with electrocautery. The Charnley retractor was placed after carefully palpating the sciatic nerve which was protected throughout the case.     A standard posterior approach to the hip was performed by releasing the piriformis, short external rotators and posterior capsule and reflecting them posteriorly as a rectangular flap. The superior capsule was scarred down; it was released and excised. The labrum was split and the femoral head mobilized. The hip was then flexed, internally rotated and dislocated from the acetabulum without excessive force. Assessment of the femoral head revealed eburnation of the articular cartilage with complete loss of the weight bearing  chondral surface. Osteophytes were present as well. Careful measurements were performed using the center of the femoral head and the lesser trochanter as markers and a femoral neck cut was made according to the preoperative plan.     Attention was then turned to the acetabulum. Retractors were placed circumferentially for wide acetabular exposure. The labrum and osteophytes were debrided from the rim, and the medial wall was identified and the depth of the socket assessed by excising the pulvinar. Bleeders were controlled, especially the area of the obturator artery with the electrocautery. Acetabular reaming was then started with the hemispherical instrument matching the size of the excised femoral head. Sequential reaming of the acetabulum was then performed by increasing size in 2 mm increments.  Reaming was performed line to line. The reamers created an excellent hemispherical bed of bleeding cancellous bone. The cup was impacted into position, targeting 40-45 degrees of abduction and 20-25 degrees of anteversion, with an excellent press-fit. The press-fit was firm, stable, and apically seated. 2 screw(s) were used for additional support of the fixation. Further osteophyte debridement was done around the socket. All impinging soft tissue was removed from the edges of the socket. The polyethylene bearing/liner was then impacted into place and checked for stability.     Attention was then turned to the femur. The leg was positioned so access did not result in soft-tissue injury. The femoral preparation was started with a box osteotome. The medullary cavity of the femur was then entered and opened with hand reamers. Femoral stem broaches were then employed in an incremental fashion up to the final size, targeting 15-20 degrees of anteversion. The final broach was fully seated, had good rotational and axial stability, and was seated at the appropriate height in relation to the greater trochanter and the preoperative  plan. Trial reduction was done. Excellent stability and range of motion was achieved without impingement at any position. The hip was stable in full extension and external rotation as well as in flexion past 90 degrees, 20 degrees adduction and 60-70 degrees of internal rotation. Leg lengths were re-created within millimeters based on the markers and relative measurement. The hip was then dislocated and the trials removed. The wound was copiously irrigated, and the permanent femoral stem was then impacted down in approximately 15-20 degrees of anteversion. The press-fit was firm, and stable to axial and rotational force in all planes. The permanent femoral head was then impacted on the clean trunnion. The socket and wound were irrigated, suctioned, and inspected for debris. The final reduction was performed, and again leg length assessment and stability assessment of the hip were performed to confirm optimal component selection and stability in all planes without impingement when stressed to the extremes.     The wound was irrigated with dilute betadine solution as well as saline, and hemostasis obtained with electrocautery. A pain cocktail was injected into the pericapsular tissues. The posterior capsule, piriformis, and short external rotators were repaired utilizing #2 Ticron through drill holes in the greater trochanter. The sciatic nerve was palpated and found to be intact and the wound was irrigated. Instrument and sponge counts were completed and confirmed correct. The fascia sheryl and gluteal fascia were closed with interrupted #1 Vicryl suture and oversewn with #2 Stratafix. The deep subcutaneous tissue was closed with running #1 Stratafix suture and the superficial subcutaneous tissue with interrupted 2-0 Vicryl suture. A 3-0 monocryl running stitch was used to close skin followed by skin glue adhesive to seal the wound. A silver impregnated dressing was then placed, followed by a sequential compression  device to the operative limb, followed by an abduction pillow. The patient was then returned to a supine position on the operating room table. The patient was sufficiently recovered from anesthesia, transferred to a hospital bed and taken to the PACU in stable condition.     One gram (1000 mg) of intravenous tranexamic acid was administered prior to incision. A second one gram (1000 mg) intravenous dose was given prior to wound closure.    No apparent complications occurred during the procedure Instrument, sponge and needle counts were correct x 2.     The patient underwent risk stratification preoperatively and aspirin was chosen for DVT prophylaxis. Delay in starting chemical prophylaxis for 23 hours from surgical incision was over concerns for hematoma formation and wound related issues.     POST OPERATIVE PLAN:   Protected weight bearing as tolerated   Posterior hip precautions x 6 weeks   PT/OT for mobilization and medical equipment needs   23 hours perioperative antibiotic prophylaxis   Pain control with PO/IV meds   Keep silver dressing in place for 7 days post op. Change dressing only if saturated.   SCD's to bilateral lower extremities   Social work for discharge planning needs   Follow up in 3 weeks for post operative wound check with XR AP pelvis.

## 2022-10-17 NOTE — ANESTHESIA POSTPROCEDURE EVALUATION
Patient: Kt Davis    Procedure Summary       Date: 10/17/22 Room / [facility]:  [facility]    Anesthesia Start: 1006 Anesthesia Stop: 1218    Procedure: TOTAL HIP ARTHROPLASTY LEFT (Left: Hip) Diagnosis:       Primary osteoarthritis of left hip      (Primary osteoarthritis of left hip [M16.12])    Surgeons: Davin Swenson MD Provider: Stone Bateman MD    Anesthesia Type: spinal, general ASA Status: 3            Anesthesia Type: spinal, general    Vitals  Vitals Value Taken Time   /73 10/17/22 1300   Temp 97.3 °F (36.3 °C) 10/17/22 1245   Pulse 80 10/17/22 1300   Resp 16 10/17/22 1300   SpO2 96 % 10/17/22 1300           Post Anesthesia Care and Evaluation    Patient location during evaluation: PACU  Patient participation: complete - patient participated  Level of consciousness: awake and alert  Pain score: 0  Pain management: adequate    Airway patency: patent  Anesthetic complications: No anesthetic complications  PONV Status: none  Cardiovascular status: hemodynamically stable and acceptable  Respiratory status: nonlabored ventilation, acceptable and nasal cannula  Hydration status: acceptable

## 2022-10-17 NOTE — ANESTHESIA PREPROCEDURE EVALUATION
Anesthesia Evaluation     Patient summary reviewed and Nursing notes reviewed   NPO Solid Status: > 8 hours             Airway   Mallampati: II  TM distance: >3 FB  Neck ROM: full  No difficulty expected  Dental      Pulmonary    (-) shortness of breath, recent URI, not a smoker  Cardiovascular   Exercise tolerance: good (4-7 METS)    ECG reviewed    (+) hypertension,   (-) past MI, CAD, dysrhythmias, angina, cardiac stents    ROS comment: ECG NSR LAE ?    Neuro/Psych  (+) CVA ( no residual ), psychiatric history, dementia,    (-) seizures    ROS Comment: SAH Subdural hematoma fall c LOC CVA  GI/Hepatic/Renal/Endo    (+) obesity,     (-) no renal disease, diabetes, no thyroid disorder    Musculoskeletal     Abdominal    Substance History      OB/GYN          Other   arthritis,    history of cancer (prosate )    (-) autoimmune disease  ROS/Med Hx Other: Sp R THR 2022                  Anesthesia Plan    ASA 3     spinal and general     (Left side as indicated and marked  )  intravenous induction     Anesthetic plan, risks, benefits, and alternatives have been provided, discussed and informed consent has been obtained with: patient.    Plan discussed with CRNA.        CODE STATUS:

## 2022-10-17 NOTE — H&P
Patient Name: Kt Davis  MRN: [id number]  : 1955  DOS: 10/17/2022    Attending: Davin Swenson MD    Primary Care Provider: Tani Vazquez MD      Chief complaint:  Left hip pain    Subjective   Patient is a pleasant 67 y.o. male presented for scheduled surgery by .    Per his note (This patient has a history of progressive left hip pain and arthritis. The hip pain is severe with activity and has progressed significantly. Non-operative treatment has been attempted, but has not improved or controlled symptoms during normal daily activities. Motion has become limited and rotation severely restricted. X-rays reveal moderate-to-severe eburnation of articular cartilage on the superior weight bearing surface of the hip with circumferential acetabular and femoral neck osteophytes consistent with advanced hip osteoarthritis. A total hip arthroplasty was recommended at this time.).    He underwent left total hip arthroplasty under spinal anesthesia and periarticular block, tolerated surgery well, is admitted for further management.    Patient is known to me from prior encounter about a month ago he had right total hip arthroplasty, did quite well postoperatively and was discharged home postop day 0 to continue to do very well with rehab.    He has no history of DVT or PE.     Allergies   Allergen Reactions   • Lisinopril Cough   • Valsartan Cough          Medications Prior to Admission   Medication Sig Dispense Refill Last Dose   • acetaminophen (TYLENOL) 650 MG 8 hr tablet Take 1 tablet by mouth Every 8 (Eight) Hours As Needed for Mild Pain.   10/16/2022 at [id number]   • amLODIPine (NORVASC) 5 MG tablet Take 1 tablet by mouth Daily. (Patient taking differently: Take 1 tablet by mouth Every Evening.) 30 tablet 3 10/16/2022 at 2000   • aspirin (ASPIR) 81 MG EC tablet Take 1 tablet by mouth 2 (Two) Times a Day. 60 tablet 0 10/16/2022 at 2100   • Chlorhexidine Gluconate 4 % solution Shower daily  with solution as directed for 5 days prior to surgery. 237 mL 0 10/16/2022 at 2000   • Magnesium Oxide 500 MG tablet Take 1 tablet by mouth Daily.   10/16/2022 at 0800   • Multiple Vitamins-Minerals (MULTIVITAL PO) Take 1 dose by mouth Every Morning.   10/16/2022 at 0800   • mupirocin (BACTROBAN) 2 % ointment Apply a pea-sized amount into each nostril as directed by provider twice daily for 5 days prior to surgery 22 g 0 10/16/2022 at 2000   • celecoxib (CeleBREX) 200 MG capsule Take 200 mg by mouth 2 (Two) Times a Day.   10/3/2022   • Dextromethorphan-guaiFENesin (MUCUS RELIEF DM PO) Take 1,200 mg by mouth As Needed.   More than a month   • oxyCODONE (Roxicodone) 5 MG immediate release tablet Take 1 tablet by mouth Every 4 (Four) Hours As Needed for Moderate Pain. 40 tablet 0          History:   Past Medical History:   Diagnosis Date   • Arthritis    • Hip arthrosis 01/2021   • History of esophageal stricture 2015   • History of gastric ulcer 2017   • History of transfusion 2017    4 units due to GI bleed, no reactions   • Hypertension    • Inguinal hernia    • Knee swelling 01/2022   • Post-traumatic brain syndrome 2018    r/t falling outside in winter. 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, concussion and brain bleed     Past Surgical History:   Procedure Laterality Date   • APPENDECTOMY  1999   • COLONOSCOPY  2015   • ESOPHAGEAL DILATATION  2015   • HIP SURGERY  9/12/2022   • INGUINAL HERNIA REPAIR Bilateral 06/18/2020    Procedure: INGUINAL HERNIA REPAIR BILATERAL WITH MESH;  Surgeon: Johnathan Durant MD;  Location:  [facility];  Service: General;  Laterality: Bilateral;   • TONSILLECTOMY AND ADENOIDECTOMY  1959   • TOTAL HIP ARTHROPLASTY Right 09/12/2022    Procedure: TOTAL HIP ARTHROPLASTY RIGHT;  Surgeon: Davin Swenson MD;  Location:  [facility];  Service: Orthopedics;  Laterality: Right;   • UPPER GASTROINTESTINAL ENDOSCOPY  2015     Family History   Problem Relation Age of Onset   • Colon cancer Mother    • Cancer  "Mother         Colon Cancer at age 83   • Osteoporosis Mother    • Parkinsonism Father    • Heart disease Father    • Arthritis Father    • Hyperlipidemia Father    • Broken bones Father         Broken Leg   • Allergies Daughter    • Asthma Daughter    • No Known Problems Son    • Other Maternal Grandmother         Unknown   • Heart block Maternal Grandfather    • Macular degeneration Paternal Grandmother    • Diverticulitis Paternal Grandfather    • Emphysema Paternal Grandfather    • Arthritis Brother      Social History     Tobacco Use   • Smoking status: Never   • Smokeless tobacco: Never   Vaping Use   • Vaping Use: Never used   Substance Use Topics   • Alcohol use: No   • Drug use: No       Review of Systems  Pertinent items are noted in HPI, all other systems reviewed and negative    Vital Signs  /84 (BP Location: Left arm, Patient Position: Lying)   Pulse 80   Temp 96.2 °F (35.7 °C) (Oral)   Resp 16   Ht 175.3 cm (69\")   Wt 88.9 kg (196 lb)   SpO2 95%   BMI 28.94 kg/m²     Physical Exam:    General Appearance:    Alert, cooperative, in no acute distress   Head:    Normocephalic, without obvious abnormality, atraumatic   Eyes:            Lids and lashes normal, conjunctivae and sclerae normal, no   icterus, no pallor, corneas clear    Ears:    Ears appear intact with no abnormalities noted   Throat:   No oral lesions, no thrush, oral mucosa moist   Neck:   No adenopathy, supple, trachea midline, no thyromegaly         Lungs:     Clear to auscultation,respirations regular, even and   unlabored. No wheezes or rales.    Heart:    Regular rhythm and normal rate, normal S1 and S2, no murmur, no gallop   Abdomen:     Normal bowel sounds, no masses, no organomegaly, soft        non-tender, non-distended, no guarding, no rebound                 tenderness   Genitalia:    Deferred   Extremities:  Left LE, CDI dressing Aquacel over left hip.  Clubbing, cyanosis, or edema.   Pulses:   Pulses palpable " and equal bilaterally   Skin:   No bleeding, bruising or rash   Neurologic:   Cranial nerves 2 - 12 grossly intact, intact flexion and dorsiflexion bilateral feet.      I reviewed the patient's new clinical results.             Invalid input(s): NEUTOPHILPCT,  EOSPCT        Invalid input(s): LABALBU, PROT  Lab Results   Component Value Date    HGBA1C 4.80 10/10/2022      Latest Reference Range & Units 10/10/22 10:37   Glucose 65 - 99 mg/dL 89   Sodium 136 - 145 mmol/L 141   Potassium 3.5 - 5.2 mmol/L 4.4   CO2 22.0 - 29.0 mmol/L 27.0   Chloride 98 - 107 mmol/L 105   Anion Gap 5.0 - 15.0 mmol/L 9.0   Creatinine 0.76 - 1.27 mg/dL 1.06   BUN 8 - 23 mg/dL 18   BUN/Creatinine Ratio 7.0 - 25.0  17.0   Calcium 8.6 - 10.5 mg/dL 9.9   eGFR >60.0 mL/min/1.73 76.4   Alkaline Phosphatase 39 - 117 U/L 95   Total Protein 6.0 - 8.5 g/dL 7.0   ALT (SGPT) 1 - 41 U/L 18   AST (SGOT) 1 - 40 U/L 17   Total Bilirubin 0.0 - 1.2 mg/dL 0.4   Albumin 3.50 - 5.20 g/dL 4.50   Globulin gm/dL 2.5   A/G Ratio g/dL 1.8   Hemoglobin A1C 4.80 - 5.60 % 4.80      Latest Reference Range & Units 10/10/22 10:37   WBC 3.40 - 10.80 10*3/mm3 7.54   RBC 4.14 - 5.80 10*6/mm3 4.29   Hemoglobin 13.0 - 17.7 g/dL 14.2   Hematocrit 37.5 - 51.0 % 41.2   RDW 12.3 - 15.4 % 12.6   MCV 79.0 - 97.0 fL 96.0   MCH 26.6 - 33.0 pg 33.1 (H)   MCHC 31.5 - 35.7 g/dL 34.5   MPV 6.0 - 12.0 fL 9.1   Platelets 140 - 450 10*3/mm3 214   RDW-SD 37.0 - 54.0 fl 44.6   (H): Data is abnormally high    Assessment and Plan:       Status post total hip replacement, left    Essential hypertension    Primary osteoarthritis of left hip      Plan:    1. PT/OT, protected weight bearing as tolerated left LE.  Posterior hip precautions 6 weeks  2. Pain control-prns  3. IS-encourage  4. DVT proph- Mechanicals and asked  5. Bowel regimen  6. Resume home medications as appropriate  7. DC planning for home    Patient is very motivated to work with physical therapy and achieve  mobility and pain  control among other goals for possible discharge home later in the day.    We reviewed these goals and discussed with patient tracking his progress for the next few hours and if all is achieved to receive next antibiotic prophylactic dose and be discharged home.     We discussed medications and precriptions at time of discharge including DVT prophylaxis, pain control, and bowel regimen.  All questions were answered .    Patient expressed understanding and agreement.wy.        Dragon disclaimer:  Part of this encounter note is an electronic transcription/translation of spoken language to printed text. The electronic translation of spoken language may permit erroneous, or at times, nonsensical words or phrases to be inadvertently transcribed; Although I have reviewed the note for such errors, some may still exist.    Tom Puente MD  10/17/22  17:36 EDT

## 2022-10-17 NOTE — ANESTHESIA PROCEDURE NOTES
Spinal Block      Patient reassessed immediately prior to procedure    Patient location during procedure: OR  Indication:at surgeon's request  Performed ByEDGARDO: Ashwin Levin SRNA  Preanesthetic Checklist  Completed: patient identified, IV checked, site marked, risks and benefits discussed, surgical consent, monitors and equipment checked, pre-op evaluation and timeout performed  Spinal Block Prep:  Patient Position:sitting  Sterile Tech:cap, gloves, sterile barriers and mask  Prep:Chloraprep  Patient Monitoring:blood pressure monitoring, continuous pulse oximetry and EKG    Spinal Block Procedure  Approach:midline  Guidance:landmark technique and palpation technique  Location:L4-L5  Needle Type:Sprotte  Needle Gauge:22 G  Placement of Spinal needle event:cerebrospinal fluid aspirated  Paresthesia: no  Fluid Appearance:clear  Medications: bupivacaine (MARCAINE) 0.5 % injection - Injection   1.8 mL - 10/17/2022 10:11:00 AM   Post Assessment  Patient Tolerance:patient tolerated the procedure well with no apparent complications  Complications no  Additional Notes  Procedure:  Pt assisted to sitting position, with legs in position of comfort over side of bed.  Pt. instructed in optimal spine presentation, the spine was prepped/ Draped and the skin at insertion site was anesthetized with 1% Lidocaine 2 ml.  The spinal needle was then advanced until CSF flow was obtained and LA was injected:

## 2022-10-17 NOTE — PLAN OF CARE
Goal Outcome Evaluation:  Plan of Care Reviewed With: patient        Progress: improving  Outcome Evaluation: OT eval complete. Pt is pleasent and cooperative. Transfers with CGA and RW. OT issued reacher, sock aid, shoe horn, and LH sponge and educated pt on use for completion of all LBD and LBB while maintaining posterior hip precautions. Pt completed all LBD with Twan and use of AE. Pt has shower seat, raised toilets, and RW. Recommend d/c home with assist and OP services.

## 2022-10-17 NOTE — PLAN OF CARE
Goal Outcome Evaluation:  Plan of Care Reviewed With: patient, spouse        Progress: no change  Outcome Evaluation: PT eval complete. Pt performed bed mobility with SBA. Pt performed STS and amb 360' with FWW and CGAx2. No knee buckling or LOB noted. Activity limited by fatigue. ADLs assessed, pt would benefit from OT consult prior to d/c. HEP and hip precautions reviewed with pt. He verbalized understanding. Recommend d/c home with assist and OPPT when medically appropriate.

## 2022-10-17 NOTE — BRIEF OP NOTE
TOTAL HIP ARTHROPLASTY  Progress Note    Kt Davis  10/17/2022    Pre-op Diagnosis:   Primary osteoarthritis of left hip [M16.12]       Post-Op Diagnosis Codes:     * Primary osteoarthritis of left hip [M16.12]    Procedure/CPT® Codes:  TX TOTAL HIP ARTHROPLASTY [37721]      Procedure(s):  TOTAL HIP ARTHROPLASTY LEFT    Surgical Approach: Hip Posterior      Surgeon(s):  Davin Swenson MD    Anesthesia: Spinal    Staff:   Circulator: Rhiannon Lorenzo RN; Jeanna Martinez RN  Radiology Technologist: Rubens Mcbride RT  Scrub Person: Bharti Swanson Christopher  Vendor Representative: Yun De Dios Brad  Nursing Assistant: Halley Urias; Jhonatan Garcia PCT  Assistant: Nanci Berger PA-C  Assistant: Nanci Berger PA-C      Estimated Blood Loss: 300 mL    Urine Voided: * No values recorded between 10/17/2022 10:06 AM and 10/17/2022 11:42 AM *    Specimens:                None          Drains: * No LDAs found *    Findings: End-stage osteoarthritis left hip        Complications: None apparent    Assistant: Nanci Berger PA-C  was responsible for performing the following activities: Retraction, Suction, Irrigation, Suturing, Closing and Placing Dressing and their skilled assistance was necessary for the success of this case.    Davin Swenson MD     Date: 10/17/2022  Time: 12:04 EDT

## 2022-10-17 NOTE — THERAPY EVALUATION
Patient Name: Kt Davis  : 1955    MRN: [id number]                              Today's Date: 10/17/2022       Admit Date: 10/17/2022    Visit Dx:     ICD-10-CM ICD-9-CM   1. Primary osteoarthritis of left hip  M16.12 715.15     Patient Active Problem List   Diagnosis   • SDH (subdural hematoma)   • SAH (subarachnoid hemorrhage) (Formerly [facility])   • Fall due to ice or snow   • Blunt head trauma due to fall   • Traumatic subarachnoid hemorrhage with loss of consciousness of 30 minutes or less (Formerly [facility])   • Essential hypertension   • Inguinal hernia of left side without obstruction or gangrene   • Medicare annual wellness visit, subsequent   • Inguinal hernia   • Encounter for screening for malignant neoplasm of prostate    • Chronic right hip pain   • Chronic pain of both knees   • Avascular necrosis of hip, right (Formerly [facility])   • Quadriceps weakness   • Leg heaviness   • Nevus   • Primary osteoarthritis of right hip   • Status post total replacement of right hip   • Primary osteoarthritis of left hip     Past Medical History:   Diagnosis Date   • Arthritis    • Hip arthrosis 2021   • History of esophageal stricture    • History of gastric ulcer    • History of transfusion 2017    4 units due to GI bleed, no reactions   • Hypertension    • Inguinal hernia    • Knee swelling 2022   • Post-traumatic brain syndrome 2018    r/t falling outside in winter. 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, concussion and brain bleed     Past Surgical History:   Procedure Laterality Date   • APPENDECTOMY     • COLONOSCOPY     • ESOPHAGEAL DILATATION     • HIP SURGERY  2022   • INGUINAL HERNIA REPAIR Bilateral 2020    Procedure: INGUINAL HERNIA REPAIR BILATERAL WITH MESH;  Surgeon: Johnathan Durant MD;  Location: [facility];  Service: General;  Laterality: Bilateral;   • TONSILLECTOMY AND ADENOIDECTOMY     • TOTAL HIP ARTHROPLASTY Right 2022    Procedure: TOTAL HIP ARTHROPLASTY RIGHT;  Surgeon: Davin Swenson  MD ALYCE;  Location: [facility];  Service: Orthopedics;  Laterality: Right;   • UPPER GASTROINTESTINAL ENDOSCOPY  2015      General Information     Row Name 10/17/22 1517          OT Time and Intention    Document Type evaluation  -HK     Mode of Treatment occupational therapy  -     Row Name 10/17/22 1517          General Information    Patient Profile Reviewed yes  -HK     Prior Level of Function min assist:;all household mobility;community mobility;gait;transfer;bed mobility;ADL's  -HK     Existing Precautions/Restrictions hip, posterior;fall  Bilteral posterior hip precautions  -HK     Barriers to Rehab none identified  -     Row Name 10/17/22 1517          Living Environment    People in Home spouse  -HK     Row Name 10/17/22 1517          Home Main Entrance    Number of Stairs, Main Entrance three;other (see comments)  plans to enter at back where there are no steps  -HK     Stair Railings, Main Entrance none  -HK     Row Name 10/17/22 1517          Stairs Within Home, Primary    Number of Stairs, Within Home, Primary none  -HK     Stair Railings, Within Home, Primary none  -HK     Stairs Comment, Within Home, Primary Pt rerports walk in shower and has shower seat if needed  -HK     Row Name 10/17/22 1517          Cognition    Orientation Status (Cognition) oriented x 4  -     Row Name 10/17/22 1517          Safety Issues, Functional Mobility    Safety Issues Affecting Function (Mobility) safety precautions follow-through/compliance;safety precaution awareness  -     Impairments Affecting Function (Mobility) balance;endurance/activity tolerance;pain;strength  -           User Key  (r) = Recorded By, (t) = Taken By, (c) = Cosigned By    Initials Name Provider Type    HK Elizabeth Dos Santos OT Occupational Therapist                 Mobility/ADL's     Row Name 10/17/22 1523          Bed Mobility    Comment, (Bed Mobility) Pt received and left [facility]. OT issued leg  and educated pt on use for bed mobility and  car transfers.  -     Row Name 10/17/22 1523          Transfers    Transfers sit-stand transfer  -     Comment, (Transfers) Pt educated on all posterior hip precautions and has good recall. Cues for hand placment and to slide L LE out prior to transfers.  -     Row Name 10/17/22 1523          Sit-Stand Transfer    Sit-Stand [place] (Transfers) contact guard;verbal cues  -     Assistive Device (Sit-Stand Transfers) walker, front-wheeled  -     Row Name 10/17/22 1523          Functional Mobility    Functional Mobility- Comment deferred to PT  -[facility] Name 10/17/22 1523          Activities of Daily Living    BADL Assessment/Intervention lower body dressing;bathing  -     Row Name 10/17/22 1523          Mobility    Extremity Weight-bearing Status left lower extremity  -     Left Lower Extremity (Weight-bearing Status) weight-bearing as tolerated (WBAT)  -[facility] Name 10/17/22 1523          Lower Body Dressing Assessment/Training    [place] Level (Lower Body Dressing) don;socks;pants/bottoms;other (see comments);minimum assist (75% patient effort);verbal cues  underwear  -     Position (Lower Body Dressing) unsupported sitting  -     Comment, (Lower Body Dressing) OT issued reacher, sock aid, and shoe horn and educated pt on use for completion of all LBD. Pt donned socks and donned pants/underwear with Twan and use of AE.  -[facility] Name 10/17/22 1523          Bathing Assessment/Intervention    Comment, (Bathing) OT issued LH sponge and educated pt on use for completion of all LBB while maintaining posterior hip precautions.  -           User Key  (r) = Recorded By, (t) = Taken By, (c) = Cosigned By    Initials Name Provider Type     Elizabeth Dos Santos, OT Occupational Therapist               Obj/Interventions     Row Name 10/17/22 1534          Sensory Assessment (Somatosensory)    Sensory Assessment (Somatosensory) sensation intact  -[facility] Name 10/17/22 1534          Vision  Assessment/Intervention    Visual Impairment/Limitations WFL  -HK     Row Name 10/17/22 1534          Range of Motion Comprehensive    General Range of Motion no range of motion deficits identified  -HK     Comment, General Range of Motion BUE WFL for eval  -HK     Row Name 10/17/22 1534          Strength Comprehensive (MMT)    General Manual Muscle Testing (MMT) Assessment no strength deficits identified  -HK     Comment, General Manual Muscle Testing (MMT) Assessment BUE WFL for eval  -HK     Row Name 10/17/22 1534          Motor Skills    Motor Skills coordination  -HK     Coordination WFL  -HK     Row Name 10/17/22 1534          Balance    Balance Assessment sitting static balance;sitting dynamic balance;standing static balance;standing dynamic balance  -HK     Static Sitting Balance independent  -HK     Dynamic Sitting Balance supervision  -HK     Position, Sitting Balance unsupported;sitting in chair  -HK     Static Standing Balance contact guard  -HK     Dynamic Standing Balance contact guard  -HK     Position/Device Used, Standing Balance supported;walker, rolling  -HK     Balance Interventions sitting;standing;occupation based/functional task  -HK           User Key  (r) = Recorded By, (t) = Taken By, (c) = Cosigned By    Initials Name Provider Type    HK Elizabeth Dos Santos, OT Occupational Therapist               Goals/Plan     Row Name 10/17/22 1538          Bed Mobility Goal 1 (OT)    Activity/Assistive Device (Bed Mobility Goal 1, OT) sit to supine/supine to sit;scooting  -HK     [place] Level/Cues Needed (Bed Mobility Goal 1, OT) minimum assist (75% or more patient effort);verbal cues required  -HK     Time Frame (Bed Mobility Goal 1, OT) by discharge;long term goal (LTG)  -HK     Progress/Outcomes (Bed Mobility Goal 1, OT) goal ongoing  -HK     Row Name 10/17/22 1538          Transfer Goal 1 (OT)    Activity/Assistive Device (Transfer Goal 1, OT)  sit-to-stand/stand-to-sit;bed-to-chair/chair-to-bed;toilet  -HK     [place] Level/Cues Needed (Transfer Goal 1, OT) supervision required  -HK     Time Frame (Transfer Goal 1, OT) by discharge;long term goal (LTG)  -HK     Progress/Outcome (Transfer Goal 1, OT) goal ongoing  -HK     Row Name 10/17/22 1538          Dressing Goal 1 (OT)    Activity/Device (Dressing Goal 1, OT) lower body dressing;reacher;long-handled shoe horn;sock-aid  -HK     [place]/Cues Needed (Dressing Goal 1, OT) minimum assist (75% or more patient effort);verbal cues required  -HK     Time Frame (Dressing Goal 1, OT) by discharge;long term goal (LTG)  -HK     Progress/Outcome (Dressing Goal 1, OT) goal met  -HK     Row Name 10/17/22 1538          Toileting Goal 1 (OT)    Activity/Device (Toileting Goal 1, OT) adjust/manage clothing;perform perineal hygiene  -HK     [place] Level/Cues Needed (Toileting Goal 1, OT) supervision required  -HK     Time Frame (Toileting Goal 1, OT) by discharge;long term goal (LTG)  -HK     Progress/Outcome (Toileting Goal 1, OT) goal ongoing  -HK     Row Name 10/17/22 1538          Therapy Assessment/Plan (OT)    Planned Therapy Interventions (OT) adaptive equipment training;BADL retraining;functional balance retraining;occupation/activity based interventions;ROM/therapeutic exercise;transfer/mobility retraining  -HK           User Key  (r) = Recorded By, (t) = Taken By, (c) = Cosigned By    Initials Name Provider Type    HK Elizabeth Dos Santos, OT Occupational Therapist               Clinical Impression     Row Name 10/17/22 1536          Pain Assessment    Pretreatment Pain Rating 0/10 - no pain  -HK     Posttreatment Pain Rating 0/10 - no pain  -HK     Row Name 10/17/22 1536          Plan of Care Review    Plan of Care Reviewed With patient  -HK     Progress improving  -HK     Outcome Evaluation OT eval complete. Pt is pleasent and cooperative. Transfers with CGA and RW. OT issued reacher, sock aid, shoe  horn, and LH sponge and educated pt on use for completion of all LBD and LBB while maintaining posterior hip precautions. Pt completed all LBD with Twan and use of AE. Pt has shower seat, raised toilets, and RW. Recommend d/c home with assist and OP services.  -     Row Name 10/17/22 1536          Therapy Assessment/Plan (OT)    Patient/Family Therapy Goal Statement (OT) Pt would like to improve and return home.  -     Rehab Potential (OT) good, to achieve stated therapy goals  -     Criteria for Skilled Therapeutic Interventions Met (OT) yes;skilled treatment is necessary  -     Therapy Frequency (OT) daily  -     Row Name 10/17/22 1536          Therapy Plan Review/Discharge Plan (OT)    Anticipated Discharge Disposition (OT) home with assist;home with outpatient therapy services  -     Row Name 10/17/22 1536          Vital Signs    Pre Systolic BP Rehab --  RN cleared for tx; VSS  -HK     O2 Delivery Pre Treatment room air  -HK     O2 Delivery Intra Treatment room air  -HK     O2 Delivery Post Treatment room air  -HK     Pre Patient Position Sitting  -HK     Intra Patient Position Standing  -HK     Post Patient Position Sitting  -HK     Row Name 10/17/22 1536          Positioning and Restraints    Pre-Treatment Position sitting in chair/recliner  -HK     Post Treatment Position chair  -HK     In Chair notified nsg;reclined;call light within reach;encouraged to call for assist;exit alarm on;legs elevated  -HK           User Key  (r) = Recorded By, (t) = Taken By, (c) = Cosigned By    Initials Name Provider Type     Elizabeth Dos Santos, OT Occupational Therapist               Outcome Measures     Row Name 10/17/22 4135          How much help from another is currently needed...    Putting on and taking off regular lower body clothing? 3  -HK     Bathing (including washing, rinsing, and drying) 3  -HK     Toileting (which includes using toilet bed pan or urinal) 3  -HK     Putting on and taking off regular  upper body clothing 4  -HK     Taking care of personal grooming (such as brushing teeth) 3  -HK     Eating meals 3  -HK     AM-PAC 6 Clicks Score (OT) 19  -HK     Row Name 10/17/22 4530          How much help from another person do you currently need...    Turning from your back to your side while in flat bed without using bedrails? 4  -HP     Moving from lying on back to sitting on the side of a flat bed without bedrails? 4  -HP     Moving to and from a bed to a chair (including a wheelchair)? 3  -HP     Standing up from a chair using your arms (e.g., wheelchair, bedside chair)? 3  -HP     Climbing 3-5 steps with a railing? 3  -HP     To walk in hospital room? 3  -HP     AM-PAC 6 Clicks Score (PT) 20  -HP     Highest level of mobility 6 --> Walked 10 steps or more  -HP     Row Name 10/17/22 1539 10/17/22 8465       Functional Assessment    Outcome Measure Options AM-PAC 6 Clicks Daily Activity (OT)  -HK AM-PAC 6 Clicks Basic Mobility (PT);PADD  -HP          User Key  (r) = Recorded By, (t) = Taken By, (c) = Cosigned By    Initials Name Provider Type     Elizabeth Dos Santos, OT Occupational Therapist     Elizabeth Welch, PT Physical Therapist                Occupational Therapy Education     Title: PT OT SLP Therapies (In Progress)     Topic: Occupational Therapy (In Progress)     Point: ADL training (Done)     Description:   Instruct learner(s) on proper safety adaptation and remediation techniques during self care or transfers.   Instruct in proper use of assistive devices.              Learning Progress Summary           Patient Acceptance, E,D,TB, VU,NR by  at 10/17/2022 153                   Point: Home exercise program (Not Started)     Description:   Instruct learner(s) on appropriate technique for monitoring, assisting and/or progressing therapeutic exercises/activities.              Learner Progress:  Not documented in this visit.          Point: Precautions (Done)     Description:   Instruct learner(s) on  prescribed precautions during self-care and functional transfers.              Learning Progress Summary           Patient Acceptance, E,D,TB, VU,NR by  at 10/17/2022 1539                   Point: Body mechanics (Done)     Description:   Instruct learner(s) on proper positioning and spine alignment during self-care, functional mobility activities and/or exercises.              Learning Progress Summary           Patient Acceptance, E,D,TB, VU,NR by  at 10/17/2022 1539                               User Key     Initials Effective Dates Name Provider Type Discipline     06/16/21 -  Elizabeth Dos Santos, OT Occupational Therapist OT              OT Recommendation and Plan  Planned Therapy Interventions (OT): adaptive equipment training, BADL retraining, functional balance retraining, occupation/activity based interventions, ROM/therapeutic exercise, transfer/mobility retraining  Therapy Frequency (OT): daily  Plan of Care Review  Plan of Care Reviewed With: patient  Progress: improving  Outcome Evaluation: OT eval complete. Pt is pleasent and cooperative. Transfers with CGA and RW. OT issued reacher, sock aid, shoe horn, and LH sponge and educated pt on use for completion of all LBD and LBB while maintaining posterior hip precautions. Pt completed all LBD with Twan and use of AE. Pt has shower seat, raised toilets, and RW. Recommend d/c home with assist and OP services.     Time Calculation:    Time Calculation- OT     Row Name 10/17/22 1450             Time Calculation- OT    OT Start Time 1450  -HK      OT Received On 10/17/22  -      OT Goal Re-Cert Due Date 10/27/22  -         Timed Charges    08037 - OT Self Care/Mgmt Minutes 25  -HK         Untimed Charges    OT Eval/Re-eval Minutes 30  -HK         Total Minutes    Timed Charges Total Minutes 25  -HK      Untimed Charges Total Minutes 30  -HK       Total Minutes 55  -HK            User Key  (r) = Recorded By, (t) = Taken By, (c) = Cosigned By    Initials Name  Provider Type     Elizabeth Dos Santos, OT Occupational Therapist              Therapy Charges for Today     Code Description Service Date Service Provider Modifiers Qty    34700566466 HC OT SELF CARE/MGMT/TRAIN EA 15 MIN 10/17/2022 Elizabeth Dos Santos, OT GO 2    99063316205 HC OT EVAL LOW COMPLEXITY 2 10/17/2022 Elizabeth Dos Santos OT GO 1               Elizabeth Dos Santos OT  10/17/2022

## 2022-10-17 NOTE — DISCHARGE INSTRUCTIONS
"DISCHARGE INSTRUCTIONS   Dr. Swenson     Total Hip Replacement/Hip Hemiarthroplasty     Wound Care   1) Keep wound / incision area clean and dry.   2) Dressing to remain in place until post-operative day 7. Upon dressing removal, assess for wound drainage. If no drainage is present, keep wound / incision area open to air as much as possible. If drainage is present, place sterile dressing to cover wound and assess daily. If drainage continues to occur after post-operative day 14, call the office for an urgent appointment. (You should be seen in the clinic within 1-2 days of calling). DO NOT REMOVE SUTURES (IF PRESENT) UNDER ANY CIRCUMSTANCES PRIOR TO FOLLOW UP APPOINTMENT.  3) No baths or swimming until otherwise instructed. The wound must remain dry for 10 days after surgery. After 10 days, you may begin to shower only if no drainage is present. No submerging the wound under standing water until cleared by your physician (no baths, hot tubs, swimming pools, etc). Sponge baths are the best way to perform personal hygiene while at the same time protecting the wound from moisture.   4) Prior to showering, the wound must remain dry for 72 consecutive hours (no drainage whatsoever) prior to showering. If the wound drains or spots, the clock \"resets\" - make sure the wound has been drainage-free for 72 consecutive hours.   5) Once you are allowed to get the wound wet, please use gentle soap to wash the wound area. DO NOT aggressively scrub the wound with a washcloth or bath sponge. Please visually inspect your wound(s) at least once daily. If the wound(s) are in a difficult to see location, please use a mirror or have someone else assist with visual inspection.   6) No scrubbing the wound. You may \"pad dry\" the wound, but do not rub, as this may open up the wound and pre-dispose to wound infection.   7) Do not apply lotions or creams to incision site, unless instructed otherwise.   8) Observe for redness, swelling, or " drainage. Please call the clinic immediately if you have fevers, chills with warmth/redness surrounding wound site or if you notice pus drainage from the wound site     Activity   No heavy lifting objects greater than 10 pounds.   No driving while on narcotic pain medication.   No submerging wound under standing water (pool, bath tub, etc.) until otherwise instructed.   You may be protected weightbearing as tolerated on your operative (left lower) extremity   Use a walker for ambulation for at least 2 weeks after surgery.  May wean from walker after 2 weeks if approved by your therapist.   Posterior hip precautions for 6 weeks: No bending the hip past 90 degrees. Do not allow the leg to cross the midline of your body (adduction). No twisting motions. Ask your physical therapist to review these precautions with you.    Blood Clot Prophylaxis   (Aspirin vs. Lovenox vs. Eliquis administration is determined by your surgeon and tailored to your specific risk profile. You will be discharged with one of these medications.) You will need to complete a total 4 week course of enteric coated aspirin 325 mg (or 81mg) twice daily or Eliquis 2.5mg twice daily, in order to minimize your risk of blood clots following surgery. You will be supplied with a prescription to obtain this. Alternatively, you will need to compete a total 2 week course of Lovenox after surgery (followed by a 2 week course of aspirin twice daily), in order to minimize the risk of blood clots following surgery. Lovenox requires a single shot in the abdomen, to be taken once daily. You will be supplied with the prescription to obtain this. Prior to your discharge from the hospital, the nursing staff will instruct you on self-administration of the Lovenox, if you will be returning directly home from the hospital.     Discharge Pain Medications   You will be given a prescription for pain medication. You should start taking this the same day after your surgery.  "Wean off as tolerated. Do not wait to take the pain medication until the pain is severe, as it will be difficult to \"catch up\" once this occurs. The pain medication usually reaches its full effect ~1 hour after ingesting. If you have been sent home on Colace, this medication should be taken until you are off all narcotic (i.e. Oxycodone, etc) pain medications, in order to prevent constipation. If you have been sent home with a combination of oxycodone and Tylenol, please take Tylenol as scheduled.  You must be careful not to exceed 4,000mg (4 grams) of Tylenol. The oxycodone is to be taken as needed for \"breakthrough\" pain.  Some common side effects of the narcotic pain medications include nausea and itching. Benadryl is a great over the counter medication that helps calm your stomach, decreases your anxiety levels, and minimizes the itching. You can easily purchase this at your local pharmacy as an over-the-counter medication. Please abide by the instructions as printed on the bottle. If your nausea persists, make sure to take small amounts of crackers or other lighter foods.     Follow-Up   Follow-up with Dr. Swenson's office in 3 weeks from the surgery date for a post-operative evaluation. Have the following xrays done upon arrival to the follow-up appointment: AP pelvis. Please call Dr. Swenson's office at [phone] for orthopaedic appointments or questions.[facility] COLD THERAPY - PATIENT INSTRUCTION SHEET    Cold Compression Therapy for your comfort and rehabilitation  Your caregivers want you to be productive in your rehab and comfortable during your stay. In keeping with those goals, you will be receiving an [facility] Cold Therapy Wrap to help ease post-operative pain and swelling that might keep you from getting back on track! Your SMI Cold Therapy Wrap is effective and simple-to-use, and you will be encouraged to apply it throughout your hospital stay and at home through the duration of your recovery.    When you " are ready to go home  Be sure to take your SMI Cold Therapy Wrap and both sets of Gel Bags with you for continued comfort and use throughout your rehabilitation. If you don't already have them, ask your nurse or aide to retrieve your SMI Gel Bags from the patient freezer.    Home use precautions  Always follow your medical professional's application instructions upon discharge. Your SMI Cold Therapy Wrap and Gel Bags are designed to last for months following your surgery. Never heat the Gel Bags unless specified by your healthcare provider. Supervision is advised when using this product on children or geriatric patients. To avoid danger of suffocation, please keep the outer plastic packaging away from children & pets.    Cold Therapy Instructions  Place Gel Bags in a freezer set ¾ of the way to max temperature for at least (4) hours. For best results, lay the Gel Bags flat and [id number] in the freezer. Once frozen, slide Gel Bags into the gel pouch and secure your wrap to the affected area with the straps.  Gel wraps that have been stored in a freezer for an extended period of time may require a (10) minute period of softening up in a room temperature environment before application.  The gel pouch acts as a protective barrier. NEVER place frozen bags directly onto skin, as this may cause frostbite injury.  The SMI Cold Therapy Wrap is designed to be able to be worm while ambulating. The compression straps can be secured well enough so that the Wrap won't fall off while moving.  Wrap Application Videos can be viewed at [email or web address].  An additional protective barrier such as clothing, a washcloth, hand-towel or pillowcase may be used during prolonged treatment applications.  The Gel-Pouch and Wrap are both Latex-Free and the Gel Bag ingredients are non toxic.    [facility] Wrap care instructions  The SMI Cold Therapy Wrap may be hand washed and hung to dry when needed.    [facility] re-order information  Additional  [facility] body specific wraps and/or Gel Bags can be re-ordered from [email or web address] or call [phone] ([phone])

## 2022-10-17 NOTE — OUTREACH NOTE
Prep Survey    Flowsheet Row Responses   [facility] patient discharged from? [place]   Is LACE score < 7 ? Yes   Emergency Room discharge w/ pulse ox? No   Eligibility [facility]   Date of Admission 10/17/22   Date of Discharge 10/17/22   Discharge Disposition Home or Self Care   Discharge diagnosis Status post total hip replacement, left,  Primary osteoarthritis of left hip   Does the patient have one of the following disease processes/diagnoses(primary or secondary)? Total Joint Replacement   Does the patient have Home health ordered? No   Is there a DME ordered? No   Prep survey completed? Yes          LEILA GOODMAN - Registered Nurse

## 2022-10-18 ENCOUNTER — TRANSITIONAL CARE MANAGEMENT TELEPHONE ENCOUNTER (OUTPATIENT)
Dept: CALL CENTER | Facility: HOSPITAL | Age: 67
End: 2022-10-18

## 2022-10-18 NOTE — OUTREACH NOTE
Call Center TCM Note    Flowsheet Row Responses   [facility] patient discharged from? [place]   Does the patient have one of the following disease processes/diagnoses(primary or secondary)? Total Joint Replacement   Joint surgery performed? Hip   TCM attempt successful? Yes   Call start time 0944   Call end time 0948   Has the patient been back in either the hospital or Emergency Department since discharge? No   Discharge diagnosis Status post total hip replacement, left,  Primary osteoarthritis of left hip   Does the patient have all medications related to this admission filled (includes all antibiotics, pain medications, etc.) Yes   Is the patient taking all medications as directed (includes completed medication regime)? Yes   Is the patient able to teach back alternate methods of pain control? Ice, Reposition, Correct alignment, Short, frequent activity   Comments OV with PCP 10/26/22 @ 1:45pm   Does the patient have an appointment with their PCP within 7 days of discharge? Yes   Has home health visited the patient within 72 hours of discharge? N/A   Psychosocial issues? No   Does the patient have a wound vac in place? No   Has the patient fallen since discharge? No   Did the patient receive a copy of their discharge instructions? Yes   Nursing interventions Reviewed instructions with patient   What is the patient's perception of their functional status since discharge? Improving   Is the patient able to teach back signs and symptoms of infection? Temp >100.4 for 24h or longer, Incisional drainage, Increased swelling or redness around incision (not associated with surgical edema), Severe discomfort or pain, Shortness of breath or chest pain   Is the patient able to teach back how to prevent infection? Wash hands before and after touching incision, Shower only as directed by surgeon, No tub baths, hot tub or swimming   Is the patient able to teach back signs and symptoms of DVT? Redness in calf, Swelling  in calf, Severe pain in calf, Area hot to touch, Shortness of breath or chest pain   If the patient is a current smoker, are they able to teach back resources for cessation? Not a smoker   Is the patient/caregiver able to teach back the hierarchy of who to call/visit for symptoms/problems? PCP, Specialist, Home health nurse, Urgent Care, ED, 911 Yes   TCM call completed? Yes   Call end time 0948   Would this patient benefit from a Referral to [facility] Social Work? No   Is the patient interested in additional calls from an ambulatory ?  NOTE:  applies to high risk patients requiring additional follow-up. No          Jessenia Chang, RN    10/18/2022, 09:52 EDT

## 2022-10-19 LAB
COTININE SERPL-MCNC: <1 NG/ML
NICOTINE SERPL-MCNC: <1 NG/ML

## 2022-10-26 ENCOUNTER — OFFICE VISIT (OUTPATIENT)
Dept: FAMILY MEDICINE CLINIC | Facility: CLINIC | Age: 67
End: 2022-10-26

## 2022-10-26 VITALS
TEMPERATURE: 98.3 F | OXYGEN SATURATION: 98 % | HEIGHT: 69 IN | BODY MASS INDEX: 29.36 KG/M2 | DIASTOLIC BLOOD PRESSURE: 66 MMHG | WEIGHT: 198.2 LBS | HEART RATE: 93 BPM | SYSTOLIC BLOOD PRESSURE: 122 MMHG

## 2022-10-26 DIAGNOSIS — I10 ESSENTIAL HYPERTENSION: Primary | ICD-10-CM

## 2022-10-26 PROCEDURE — 99213 OFFICE O/P EST LOW 20 MIN: CPT | Performed by: FAMILY MEDICINE

## 2022-10-26 RX ORDER — AMLODIPINE BESYLATE 5 MG/1
5 TABLET ORAL DAILY
Qty: 90 TABLET | Refills: 3 | Status: SHIPPED | OUTPATIENT
Start: 2022-10-26 | End: 2022-12-08

## 2022-10-26 NOTE — PROGRESS NOTES
Kt Davis is a 67 y.o. male who presents today for Hypertension (F/u)      Patient has right hip replacement 5 weeks ago and left hip replacement last Monday. He has been doing well and is only needing tylenol for pain relief. Patient states that his knee pain and thigh weakness have improved since surgery. Patient started PT today.     Hypertension  This is a chronic problem. The current episode started more than 1 year ago. The problem is unchanged. The problem is controlled. Pertinent negatives include no anxiety, blurred vision, chest pain, headaches, malaise/fatigue, neck pain, orthopnea, palpitations, peripheral edema, PND, shortness of breath or sweats. Agents associated with hypertension include NSAIDs. Risk factors for coronary artery disease include male gender. Current antihypertension treatment includes calcium channel blockers. Compliance problems include exercise.         Review of Systems   Constitutional: Negative for fever, malaise/fatigue and unexpected weight loss.   HENT: Negative for congestion, ear pain and sore throat.    Eyes: Negative for blurred vision and visual disturbance.   Respiratory: Negative for cough, shortness of breath and wheezing.    Cardiovascular: Negative for chest pain, palpitations, orthopnea and PND.   Gastrointestinal: Negative for abdominal pain, blood in stool, constipation, diarrhea, nausea, vomiting and GERD.   Endocrine: Negative for polydipsia and polyuria.   Genitourinary: Negative for difficulty urinating.   Musculoskeletal: Positive for arthralgias (hip pain improving). Negative for joint swelling and neck pain.   Skin: Negative for rash and skin lesions.   Allergic/Immunologic: Negative for environmental allergies.   Neurological: Negative for seizures and syncope.   Hematological: Does not bruise/bleed easily.   Psychiatric/Behavioral: Negative for suicidal ideas.        The following portions of the patient's history were reviewed and updated as  "appropriate: allergies, current medications, past family history, past medical history, past social history, past surgical history and problem list.    Current Outpatient Medications on File Prior to Visit   Medication Sig Dispense Refill   • acetaminophen (TYLENOL) 650 MG 8 hr tablet Take 1 tablet by mouth Every 8 (Eight) Hours As Needed for Mild Pain.     • aspirin (ASPIR) 81 MG EC tablet Take 1 tablet by mouth 2 (Two) Times a Day. 60 tablet 0   • Dextromethorphan-guaiFENesin (MUCUS RELIEF DM PO) Take 1,200 mg by mouth As Needed.     • Magnesium Oxide 500 MG tablet Take 1 tablet by mouth Daily.     • Multiple Vitamins-Minerals (MULTIVITAL PO) Take 1 dose by mouth Every Morning.     • polyethylene glycol (MIRALAX) 17 g packet Take 17 g by mouth Daily for 15 days. 15 packet 0   • [DISCONTINUED] amLODIPine (NORVASC) 5 MG tablet Take 1 tablet by mouth Daily. (Patient taking differently: Take 1 tablet by mouth Every Evening.) 30 tablet 3   • [DISCONTINUED] celecoxib (CeleBREX) 200 MG capsule Take 200 mg by mouth 2 (Two) Times a Day.     • [DISCONTINUED] oxyCODONE (Roxicodone) 5 MG immediate release tablet Take 1 tablet by mouth Every 4 (Four) Hours As Needed for Moderate Pain. 40 tablet 0     No current facility-administered medications on file prior to visit.       Allergies   Allergen Reactions   • Lisinopril Cough   • Valsartan Cough        Visit Vitals  /66   Pulse 93   Temp 98.3 °F (36.8 °C)   Ht 175.3 cm (69\")   Wt 89.9 kg (198 lb 3.2 oz)   SpO2 98%   BMI 29.27 kg/m²        Physical Exam  Constitutional:       General: He is not in acute distress.     Appearance: He is well-developed. He is not diaphoretic.   HENT:      Head: Atraumatic.   Cardiovascular:      Rate and Rhythm: Normal rate and regular rhythm.      Heart sounds: Normal heart sounds. No murmur heard.    No friction rub. No gallop.   Pulmonary:      Effort: Pulmonary effort is normal. No respiratory distress.      Breath sounds: Normal breath " sounds. No stridor. No wheezing, rhonchi or rales.   Musculoskeletal:      Cervical back: Normal range of motion and neck supple.   Skin:     General: Skin is warm and dry.   Neurological:      Mental Status: He is alert and oriented to person, place, and time.   Psychiatric:         Behavior: Behavior normal.          Results for orders placed or performed during the hospital encounter of 10/17/22   POC Glucose Once    Specimen: Blood   Result Value Ref Range    Glucose 79 70 - 130 mg/dL        Problems Addressed this Visit        Cardiac and Vasculature    Essential hypertension - Primary     Hypertension is unchanged.  Continue current treatment regimen.  Blood pressure will be reassessed at the next regular appointment.         Relevant Medications    amLODIPine (NORVASC) 5 MG tablet   Diagnoses       Codes Comments    Essential hypertension    -  Primary ICD-10-CM: I10  ICD-9-CM: 401.9           Return for Next scheduled follow up.    Tani Vazquez MD   10/26/2022

## 2022-11-08 ENCOUNTER — OFFICE VISIT (OUTPATIENT)
Dept: ORTHOPEDIC SURGERY | Facility: CLINIC | Age: 67
End: 2022-11-08

## 2022-11-08 VITALS — TEMPERATURE: 97.8 F

## 2022-11-08 DIAGNOSIS — Z96.642 STATUS POST TOTAL HIP REPLACEMENT, LEFT: Primary | ICD-10-CM

## 2022-11-08 PROCEDURE — 99024 POSTOP FOLLOW-UP VISIT: CPT | Performed by: PHYSICIAN ASSISTANT

## 2022-11-08 NOTE — PROGRESS NOTES
[facility] Orthopaedic Surgery Clinic Note      Subjective     CC: Post-op (3 weeks s/p Total Hip Arthroplasty Left 10/17/22)      HPI    Kt Davis is a 67 y.o. male.  Patient presents a 3-week status post left total hip arthroplasty with Dr. Swenson.  He reports pain 1 out of 10.  Significantly better than prior to surgery.  He is doing physical therapy.  Taking nothing for pain.    Overall, patient's symptoms are improved    ROS:    Constiutional:Pt denies fever, chills, nausea, or vomiting.  MSK:as above        Objective      Past Medical History  Past Medical History:   Diagnosis Date   • Arthritis    • Hip arthrosis 01/2021   • History of esophageal stricture 2015   • History of gastric ulcer 2017   • History of transfusion 2017    4 units due to GI bleed, no reactions   • Inguinal hernia    • Knee swelling 01/2022   • Post-traumatic brain syndrome 2018    r/t falling outside in winter. 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, concussion and brain bleed         Physical Exam  Temp 97.8 °F (36.6 °C)     There is no height or weight on file to calculate BMI.    Patient is well nourished and well developed.        Ortho Exam  Left hip exam: Posterior hip incision is healing well.  5/5 motor strength.  No pain with hip motion.  Neurovascular intact distally.    I   Assessment:  1. Status post total hip replacement, left        Plan:  1. Recommend over the counter anti-inflammatories for pain and/or swelling  2. Doing well status post left total hip arthroplasty Dr. Swenson on 10/17/2022.  X-rays today show well-positioned total part plasty with no evidence of osteolysis, signs of fracture.  Patient will continue his PTP return to see Dr. Swenson in 3 weeks with repeat AP pelvis or sooner if needed.      Nanci Berger PA-C  11/10/22  08:05 EST      Dragon disclaimer:  Much of this encounter note is an electronic transcription/translation of spoken language to printed text. The electronic translation of spoken language  may permit erroneous, or at times, nonsensical words or phrases to be inadvertently transcribed; Although I have reviewed the note for such errors, some may still exist.

## 2022-12-08 ENCOUNTER — OFFICE VISIT (OUTPATIENT)
Dept: ORTHOPEDIC SURGERY | Facility: CLINIC | Age: 67
End: 2022-12-08

## 2022-12-08 DIAGNOSIS — Z96.642 STATUS POST TOTAL HIP REPLACEMENT, LEFT: Primary | ICD-10-CM

## 2022-12-08 PROCEDURE — 99024 POSTOP FOLLOW-UP VISIT: CPT | Performed by: ORTHOPAEDIC SURGERY

## 2022-12-08 RX ORDER — MULTIPLE VITAMINS W/ MINERALS TAB 9MG-400MCG
1 TAB ORAL DAILY
COMMUNITY

## 2022-12-08 RX ORDER — AMLODIPINE BESYLATE 5 MG/1
5 TABLET ORAL DAILY
COMMUNITY
End: 2023-03-27 | Stop reason: SDUPTHER

## 2022-12-08 NOTE — PROGRESS NOTES
Orthopaedic Clinic Note:  Hip Post Op    Chief Complaint   Patient presents with   • Post-op     4 week recheck - s/p Total Hip Arthroplasty Left 10/17/22        HPI     is 6  week(s) s/p left total hip arthroplasty. Rates pain 0/10. He is ambulating with no assistive device and is taking nothing for pain control. He denies fevers, chills, or constitutional symptoms. He has completed outpatient PT. Patient is improving overall.  Denies complications.  He is happy with his outcome..    I have reviewed the following portions of the patient's history:History of Present Illness    Past Medical History:   Diagnosis Date   • Arthritis    • Hip arthrosis 01/2021   • History of esophageal stricture 2015   • History of gastric ulcer 2017   • History of transfusion 2017    4 units due to GI bleed, no reactions   • Inguinal hernia    • Knee swelling 01/2022   • Post-traumatic brain syndrome 2018    r/t falling outside in winter. 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, concussion and brain bleed      Past Surgical History:   Procedure Laterality Date   • APPENDECTOMY  1999   • COLONOSCOPY  2015   • ESOPHAGEAL DILATATION  2015   • HIP SURGERY  9/12/2022   • INGUINAL HERNIA REPAIR Bilateral 06/18/2020    Procedure: INGUINAL HERNIA REPAIR BILATERAL WITH MESH;  Surgeon: Johnathan Durant MD;  Location:  [facility];  Service: General;  Laterality: Bilateral;   • TONSILLECTOMY AND ADENOIDECTOMY  1959   • TOTAL HIP ARTHROPLASTY Right 09/12/2022    Procedure: TOTAL HIP ARTHROPLASTY RIGHT;  Surgeon: Davin Swenson MD;  Location:  [facility];  Service: Orthopedics;  Laterality: Right;   • TOTAL HIP ARTHROPLASTY Left 10/17/2022    Procedure: TOTAL HIP ARTHROPLASTY LEFT;  Surgeon: Davin Swenson MD;  Location:  [facility];  Service: Orthopedics;  Laterality: Left;   • UPPER GASTROINTESTINAL ENDOSCOPY  2015      Family History   Problem Relation Age of Onset   • Colon cancer Mother    • Cancer Mother         Colon Cancer at age 83   •  Osteoporosis Mother    • Parkinsonism Father    • Heart disease Father    • Arthritis Father    • Hyperlipidemia Father    • Broken bones Father         Broken Leg   • Allergies Daughter    • Asthma Daughter    • No Known Problems Son    • Other Maternal Grandmother         Unknown   • Heart block Maternal Grandfather    • Macular degeneration Paternal Grandmother    • Diverticulitis Paternal Grandfather    • Emphysema Paternal Grandfather    • Arthritis Brother      Social History     Socioeconomic History   • Marital status:      Spouse name: Shena   • Number of children: 2   • Years of education: College   Tobacco Use   • Smoking status: Never   • Smokeless tobacco: Never   Vaping Use   • Vaping Use: Never used   Substance and Sexual Activity   • Alcohol use: No   • Drug use: No   • Sexual activity: Yes     Partners: Female     Comment:       Current Outpatient Medications on File Prior to Visit   Medication Sig Dispense Refill   • amLODIPine (NORVASC) 5 MG tablet Take 5 mg by mouth Daily.     • DEXTROMETHORPHAN-GUAIFENESIN PO Take  by mouth.     • multivitamin with minerals (MULTIVITAMIN ADULT PO) Take 1 tablet by mouth Daily.     • [DISCONTINUED] acetaminophen (TYLENOL) 650 MG 8 hr tablet Take 1 tablet by mouth Every 8 (Eight) Hours As Needed for Mild Pain.     • [DISCONTINUED] amLODIPine (NORVASC) 5 MG tablet Take 1 tablet by mouth Daily. 90 tablet 3   • [DISCONTINUED] aspirin (ASPIR) 81 MG EC tablet Take 1 tablet by mouth 2 (Two) Times a Day. 60 tablet 0   • [DISCONTINUED] Dextromethorphan-guaiFENesin (MUCUS RELIEF DM PO) Take 1,200 mg by mouth As Needed.     • [DISCONTINUED] Magnesium Oxide 500 MG tablet Take 1 tablet by mouth Daily.     • [DISCONTINUED] Multiple Vitamins-Minerals (MULTIVITAL PO) Take 1 dose by mouth Every Morning.       No current facility-administered medications on file prior to visit.      Allergies   Allergen Reactions   • Lisinopril Cough   • Valsartan Cough        Review  of Systems   Constitutional: Negative.    HENT: Negative.    Eyes: Negative.    Respiratory: Negative.    Cardiovascular: Negative.    Gastrointestinal: Negative.    Endocrine: Negative.    Genitourinary: Negative.    Musculoskeletal: Positive for arthralgias.   Skin: Negative.    Allergic/Immunologic: Negative.    Neurological: Negative.    Hematological: Negative.    Psychiatric/Behavioral: Negative.         Physical Exam  There were no vitals taken for this visit.    There is no height or weight on file to calculate BMI.    GENERAL APPEARANCE: awake, alert, oriented, in no acute distress and well developed, well nourished  LUNGS:  breathing nonlabored  EXTREMITIES: no clubbing, cyanosis  PERIPHERAL PULSES: palpable dorsalis pedis and posterior tibial pulses bilaterally.    GAIT:  Normal            Hip Exam:  Left    RANGE OF MOTION:  EXTENSION/FLEXION:  normal (0-110 degrees)  IR:  20  ER:  40  PAIN WITH HIP MOTION:  no  PAIN WITH LOGROLL:  no     STRENGTH:  ABDUCTOR:  5/5  ADDUCTOR:  5/5  HIP FLEXION:  5/5    GREATER TROCHANTER BURSAL PAIN:  no    SENSATION TO LIGHT TOUCH:  DEEP PERONEAL/SUPERFICIAL PERONEAL/SURAL/SAPHENOUS/TIBIAL:   intact    EDEMA:  no  ERYTHEMA:  no  WOUNDS/INCISIONS:   yes, well healed surgical incision without evidence of erythema or drainage  _______________________________________________________________  _______________________________________________________________    RADIOGRAPHIC FINDINGS:   Indication: Status post left total hip arthroplasty    Comparison: Todays xrays were compared to previous xrays from 11/8/2022    AP pelvis: Right: Demonstrate a well positioned total hip without evidence of wear, loosening, fracture or osteolysis, femoral head is concentrically reduced within the acetabulum and No significant changes compared to prior radiographs.;Left: Demonstrate a well positioned total hip without evidence of wear, loosening, fracture or osteolysis, femoral head is  concentrically reduced within the acetabulum and No significant changes compared to prior radiographs.        Assessment/Plan:   Diagnosis Plan   1. Status post total hip replacement, left  XR Pelvis 1 or 2 View        Patient is doing well 6-week status post left total hip arthroplasty.  He may relax on his hip precautions at this time.  Continue working on gait training and strengthening.  I will see him back in 2 months for repeat assessment x-ray AP pelvis on return.  He is welcome follow-up sooner should problems arise peer    Davin Swenson MD  12/08/22  07:38 EST

## 2023-02-09 ENCOUNTER — OFFICE VISIT (OUTPATIENT)
Dept: ORTHOPEDIC SURGERY | Facility: CLINIC | Age: 68
End: 2023-02-09
Payer: MEDICARE

## 2023-02-09 VITALS — HEIGHT: 69 IN | BODY MASS INDEX: 28.76 KG/M2 | WEIGHT: 194.2 LBS

## 2023-02-09 DIAGNOSIS — Z96.641 STATUS POST TOTAL REPLACEMENT OF RIGHT HIP: ICD-10-CM

## 2023-02-09 DIAGNOSIS — Z96.642 STATUS POST TOTAL HIP REPLACEMENT, LEFT: Primary | ICD-10-CM

## 2023-02-09 PROCEDURE — 99213 OFFICE O/P EST LOW 20 MIN: CPT | Performed by: ORTHOPAEDIC SURGERY

## 2023-02-09 NOTE — PROGRESS NOTES
Orthopaedic Clinic Note: Hip Established Patient    Chief Complaint   Patient presents with   • Follow-up     2 month f/u--3.5 months status post Total Hip Arthroplasty Left 10/17/22; 5 months status post TOTAL HIP ARTHROPLASTY RIGHT  9/12/22        HPI    It has been 2  month(s) since 's last visit. He returns to clinic today for follow-up bilateral total hip arthroplasty.  He is approximately 3 and half months out from left hip replacement 5 months out from right hip replacement.  Rates his pain 0/10 on the pain scale.  He is ambulating with no assistive device.  Denies fevers chills or constitutional symptoms.  Overall he is happy with his outcomes.    Past Medical History:   Diagnosis Date   • Arthritis    • Hip arthrosis 01/2021   • History of esophageal stricture 2015   • History of gastric ulcer 2017   • History of transfusion 2017    4 units due to GI bleed, no reactions   • Inguinal hernia    • Knee swelling 01/2022   • Post-traumatic brain syndrome 2018    r/t falling outside in winter. 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, concussion and brain bleed      Past Surgical History:   Procedure Laterality Date   • APPENDECTOMY  1999   • COLONOSCOPY  2015   • ESOPHAGEAL DILATATION  2015   • HIP SURGERY  9/12/2022   • INGUINAL HERNIA REPAIR Bilateral 06/18/2020    Procedure: INGUINAL HERNIA REPAIR BILATERAL WITH MESH;  Surgeon: Johnathan Durant MD;  Location:  [facility];  Service: General;  Laterality: Bilateral;   • JOINT REPLACEMENT  09/12/2022   • TONSILLECTOMY AND ADENOIDECTOMY  1959   • TOTAL HIP ARTHROPLASTY Right 09/12/2022    Procedure: TOTAL HIP ARTHROPLASTY RIGHT;  Surgeon: Davin Swenson MD;  Location:  [facility];  Service: Orthopedics;  Laterality: Right;   • TOTAL HIP ARTHROPLASTY Left 10/17/2022    Procedure: TOTAL HIP ARTHROPLASTY LEFT;  Surgeon: Davin Swenson MD;  Location:  [facility];  Service: Orthopedics;  Laterality: Left;   • UPPER GASTROINTESTINAL ENDOSCOPY  2015      Family History    Problem Relation Age of Onset   • Colon cancer Mother    • Cancer Mother         Colon Cancer at age 83   • Osteoporosis Mother    • Parkinsonism Father    • Heart disease Father    • Arthritis Father    • Hyperlipidemia Father    • Broken bones Father         Broken Leg   • Allergies Daughter    • Asthma Daughter    • No Known Problems Son    • Other Maternal Grandmother         Unknown   • Heart block Maternal Grandfather    • Macular degeneration Paternal Grandmother    • Diverticulitis Paternal Grandfather    • Emphysema Paternal Grandfather    • Arthritis Brother      Social History     Socioeconomic History   • Marital status:      Spouse name: Shena   • Number of children: 2   • Years of education: College   Tobacco Use   • Smoking status: Never   • Smokeless tobacco: Never   Vaping Use   • Vaping Use: Never used   Substance and Sexual Activity   • Alcohol use: No   • Drug use: No   • Sexual activity: Yes     Partners: Female     Birth control/protection: None     Comment:       Current Outpatient Medications on File Prior to Visit   Medication Sig Dispense Refill   • amLODIPine (NORVASC) 5 MG tablet Take 5 mg by mouth Daily.     • DEXTROMETHORPHAN-GUAIFENESIN PO Take  by mouth.     • multivitamin with minerals tablet tablet Take 1 tablet by mouth Daily.       No current facility-administered medications on file prior to visit.      Allergies   Allergen Reactions   • Lisinopril Cough   • Valsartan Cough        Review of Systems   Constitutional: Negative.    HENT: Negative.    Eyes: Negative.    Respiratory: Negative.    Cardiovascular: Negative.    Gastrointestinal: Negative.    Endocrine: Negative.    Genitourinary: Negative.    Musculoskeletal: Positive for arthralgias.   Skin: Negative.    Allergic/Immunologic: Negative.    Neurological: Negative.    Hematological: Negative.    Psychiatric/Behavioral: Negative.         The patient's Review of Systems was personally reviewed and confirmed  "as accurate.    Physical Exam  Height 175.3 cm (69.02\"), weight 88.1 kg (194 lb 3.2 oz).    Body mass index is 28.67 kg/m².    GENERAL APPEARANCE: awake, alert, oriented, in no acute distress and well developed, well nourished  LUNGS:  breathing nonlabored  EXTREMITIES: no clubbing, cyanosis  PERIPHERAL PULSES: palpable dorsalis pedis and posterior tibial pulses bilaterally.    GAIT:  Normal            Hip Exam:  Bilateral    RANGE OF MOTION:  EXTENSION/FLEXION:  normal (0-110 degrees)  IR (at 90 degrees of flexion):  20  ER (at 90 degrees of flexion):  40  PAIN WITH HIP MOTION:  no  PAIN WITH LOGROLL:  no     STINCHFIELD TEST: negative    STRENGTH:  ABDUCTOR:  5/5  ADDUCTOR:  5/5  HIP FLEXION:  5/5    GREATER TROCHANTER BURSAL PAIN:  no    SENSATION TO LIGHT TOUCH:  DEEP PERONEAL/SUPERFICIAL PERONEAL/SURAL/SAPHENOUS/TIBIAL:   intact    EDEMA:  no  ERYTHEMA:  no  WOUNDS/INCISIONS:   yes, well healed surgical incision without evidence of erythema or drainage  _________________________________________________________________  _________________________________________________________________    RADIOGRAPHIC FINDINGS:   Indication: Status post bilateral total hip arthroplasty    Comparison: Todays xrays were compared to previous xrays from 12/8/2022    AP pelvis: Right: Demonstrate a well positioned total hip without evidence of wear, loosening, fracture or osteolysis, femoral head is concentrically reduced within the acetabulum and No significant changes compared to prior radiographs.;Left: Demonstrate a well positioned total hip without evidence of wear, loosening, fracture or osteolysis, femoral head is concentrically reduced within the acetabulum and No significant changes compared to prior radiographs.      Assessment/Plan:   Diagnosis Plan   1. Status post total hip replacement, left  XR Pelvis 1 or 2 View      2. Status post total replacement of right hip          Patient is doing well 3 and half month status " post left total hip arthroplasty and 5 months out from right hip arthroplasty.  I recommend activity as tolerated without restrictions.  I will see the patient back in 9 months for 1 year anniversary for repeat assessment of the bilateral hips.  X-ray AP pelvis on return.  He is welcome follow-up sooner should problems arise.    Davin Swenson MD  02/09/23  14:53 EST

## 2023-03-20 ENCOUNTER — OFFICE VISIT (OUTPATIENT)
Dept: GASTROENTEROLOGY | Facility: CLINIC | Age: 68
End: 2023-03-20
Payer: MEDICARE

## 2023-03-20 VITALS
BODY MASS INDEX: 28.91 KG/M2 | WEIGHT: 195.2 LBS | HEART RATE: 98 BPM | DIASTOLIC BLOOD PRESSURE: 96 MMHG | HEIGHT: 69 IN | SYSTOLIC BLOOD PRESSURE: 156 MMHG | TEMPERATURE: 98 F | OXYGEN SATURATION: 98 %

## 2023-03-20 DIAGNOSIS — K29.70 GASTRITIS WITHOUT BLEEDING, UNSPECIFIED CHRONICITY, UNSPECIFIED GASTRITIS TYPE: ICD-10-CM

## 2023-03-20 DIAGNOSIS — R13.19 ESOPHAGEAL DYSPHAGIA: Primary | ICD-10-CM

## 2023-03-20 DIAGNOSIS — K44.9 HIATAL HERNIA: ICD-10-CM

## 2023-03-20 PROCEDURE — 3077F SYST BP >= 140 MM HG: CPT | Performed by: PHYSICIAN ASSISTANT

## 2023-03-20 PROCEDURE — 3080F DIAST BP >= 90 MM HG: CPT | Performed by: PHYSICIAN ASSISTANT

## 2023-03-20 PROCEDURE — 99215 OFFICE O/P EST HI 40 MIN: CPT | Performed by: PHYSICIAN ASSISTANT

## 2023-03-20 PROCEDURE — 1159F MED LIST DOCD IN RCRD: CPT | Performed by: PHYSICIAN ASSISTANT

## 2023-03-20 PROCEDURE — 1160F RVW MEDS BY RX/DR IN RCRD: CPT | Performed by: PHYSICIAN ASSISTANT

## 2023-03-20 RX ORDER — OMEPRAZOLE 20 MG/1
20 CAPSULE, DELAYED RELEASE ORAL DAILY
Qty: 30 CAPSULE | Refills: 5 | Status: SHIPPED | OUTPATIENT
Start: 2023-03-20

## 2023-03-20 NOTE — PROGRESS NOTES
New Patient Consultation     Patient Name: Kt Davis  : 1955   MRN: [id number]     Chief Complaint:    Chief Complaint   Patient presents with   • Difficulty Swallowing       History of Present Illness: Kt Davis is a 68 y.o. male, PMH includes HTN, arthritis, who is here today for a Gastroenterology Consultation for esophageal dysphagia.     Pt notes increasing frequency of esophageal dysphagia, which he describes as mid esophagus bolus sensation with certain solid foods (breads, pasta, meat, etc). He is able to clear the bolus with repeated swallowing, time, or occasional emesis. He notes occasional episodes of hiccups. Patient denies associated fever, chills, abdominal pain, nausea, vomiting, diarrhea, constipation, hematemesis, hematochezia, melena, bloating, weight loss or gain, dysuria, jaundice or bruising.    Patient denies personal or FHx of H Pylori, pancreatitis, colitis, Celiac disease, UC, Crohn's disease, IBS or gastric cancers. Mother with colon cancer in her 80s. Pt denies EtOH, tobacco, illicit substance or NSAID use.    EGD  with Dr. Wang. Normal upper / middle esophagus. Benign appearing esophageal stricture, dilated. Medium sized hiatal hernia. Antral gastritis. One duodenal ulcer with clean base.     CSY  with Dr. Wang. No acute findings. Recommend repeat CSY in 10 years.     Subjective      Review of Systems:   Review of Systems   Constitutional: Negative for appetite change, chills, diaphoresis, fatigue, fever, unexpected weight gain and unexpected weight loss.   HENT: Positive for trouble swallowing. Negative for drooling, facial swelling, mouth sores, nosebleeds, rhinorrhea, sore throat, swollen glands and tinnitus.    Eyes: Negative.    Respiratory: Negative for choking, chest tightness and shortness of breath.    Gastrointestinal: Negative for abdominal pain, anal bleeding, blood in stool, constipation, diarrhea, nausea, vomiting, GERD and  indigestion.   Endocrine: Negative.    Genitourinary: Negative for dysuria, flank pain and hematuria.   Musculoskeletal: Negative for arthralgias, back pain, myalgias and neck pain.   Skin: Negative for color change, dry skin, pallor and bruise.   Neurological: Negative for dizziness, tremors, syncope, weakness and numbness.   Psychiatric/Behavioral: Negative for decreased concentration, hallucinations and self-injury. The patient is not nervous/anxious.    All other systems reviewed and are negative.      Past Medical History:   Past Medical History:   Diagnosis Date   • Arthritis    • Hip arthrosis 01/2021   • History of esophageal stricture 2015   • History of gastric ulcer 2017   • History of transfusion 2017    4 units due to GI bleed, no reactions   • Hypertension    • Inguinal hernia    • Knee swelling 01/2022   • Post-traumatic brain syndrome 2018    r/t falling outside in winter. 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, concussion and brain bleed       Past Surgical History:   Past Surgical History:   Procedure Laterality Date   • APPENDECTOMY  1999   • COLONOSCOPY  2015   • ESOPHAGEAL DILATATION  2015   • HERNIA REPAIR     • HIP SURGERY  9/12/2022   • INGUINAL HERNIA REPAIR Bilateral 06/18/2020    Procedure: INGUINAL HERNIA REPAIR BILATERAL WITH MESH;  Surgeon: Johnathan Durant MD;  Location:  [facility];  Service: General;  Laterality: Bilateral;   • JOINT REPLACEMENT  09/12/2022   • TONSILLECTOMY AND ADENOIDECTOMY  1959   • TOTAL HIP ARTHROPLASTY Right 09/12/2022    Procedure: TOTAL HIP ARTHROPLASTY RIGHT;  Surgeon: Davin Swenson MD;  Location:  [facility];  Service: Orthopedics;  Laterality: Right;   • TOTAL HIP ARTHROPLASTY Left 10/17/2022    Procedure: TOTAL HIP ARTHROPLASTY LEFT;  Surgeon: Davin Swenson MD;  Location:  [facility];  Service: Orthopedics;  Laterality: Left;   • UPPER GASTROINTESTINAL ENDOSCOPY  2015       Family History:   Family History   Problem Relation Age of Onset   • Colon cancer Mother  "   • Cancer Mother         Colon Cancer at age 83   • Osteoporosis Mother    • Parkinsonism Father    • Heart disease Father    • Arthritis Father    • Hyperlipidemia Father    • Broken bones Father         Broken Leg   • Arthritis Brother    • Dysphagia Brother    • Other Maternal Grandmother         Unknown   • Heart block Maternal Grandfather    • Macular degeneration Paternal Grandmother    • Diverticulitis Paternal Grandfather    • Emphysema Paternal Grandfather    • Allergies Daughter    • Asthma Daughter    • No Known Problems Son        Social History:   Social History     Socioeconomic History   • Marital status:      Spouse name: Shena   • Number of children: 2   • Years of education: College   Tobacco Use   • Smoking status: Never   • Smokeless tobacco: Never   Vaping Use   • Vaping Use: Never used   Substance and Sexual Activity   • Alcohol use: No   • Drug use: No   • Sexual activity: Yes     Partners: Female     Birth control/protection: None     Comment:        Alcohol/Tobacco History:   Social History     Substance and Sexual Activity   Alcohol Use No     Social History     Tobacco Use   Smoking Status Never   Smokeless Tobacco Never       Medications:     Current Outpatient Medications:   •  amLODIPine (NORVASC) 5 MG tablet, Take 1 tablet by mouth Daily., Disp: , Rfl:   •  DEXTROMETHORPHAN-GUAIFENESIN PO, Take  by mouth., Disp: , Rfl:   •  multivitamin with minerals tablet tablet, Take 1 tablet by mouth Daily., Disp: , Rfl:     Allergies:   Allergies   Allergen Reactions   • Lisinopril Cough   • Valsartan Cough       Objective     Physical Exam:  Vital Signs:   Vitals:    03/20/23 1109   BP: 156/96   BP Location: Left arm   Patient Position: Sitting   Cuff Size: Adult   Pulse: 98   Temp: 98 °F (36.7 °C)   TempSrc: Temporal   SpO2: 98%   Weight: 88.5 kg (195 lb 3.2 oz)   Height: 175.3 cm (69.02\")     Body mass index is 28.81 kg/m².     Physical Exam  Vitals and nursing note reviewed. "   Constitutional:       General: He is not in acute distress.     Appearance: Normal appearance. He is not ill-appearing or diaphoretic.   HENT:      Head: Normocephalic and atraumatic.      Right Ear: External ear normal.      Left Ear: External ear normal.      Nose: Nose normal. No rhinorrhea.      Mouth/Throat:      Mouth: Mucous membranes are moist.      Pharynx: Oropharynx is clear. No posterior oropharyngeal erythema.   Eyes:      General:         Right eye: No discharge.         Left eye: No discharge.      Conjunctiva/sclera: Conjunctivae normal.      Pupils: Pupils are equal, round, and reactive to light.   Cardiovascular:      Rate and Rhythm: Normal rate and regular rhythm.      Heart sounds: Normal heart sounds.   Pulmonary:      Effort: Pulmonary effort is normal.      Breath sounds: Normal breath sounds.   Abdominal:      General: Abdomen is flat. There is no distension.      Tenderness: There is no abdominal tenderness. There is no guarding or rebound.   Musculoskeletal:         General: Normal range of motion.      Cervical back: Normal range of motion.   Skin:     General: Skin is warm and dry.      Capillary Refill: Capillary refill takes less than 2 seconds.      Coloration: Skin is not jaundiced or pale.   Neurological:      General: No focal deficit present.      Mental Status: He is alert and oriented to person, place, and time. Mental status is at baseline.   Psychiatric:         Mood and Affect: Mood normal.         Thought Content: Thought content normal.         Judgment: Judgment normal.         Assessment / Plan      Assessment/Plan:   There are no diagnoses linked to this encounter.     Esophageal dysphagia, s/p dilation via EGD 2015  Hiatal hernia  Gastritis   - rx for omeprazole 20mg daily sent to pharmacy   - pt given GERD diet instructions, advised to avoid GI irritants such as caffeine, carbonation, EtOH, tobacco, chocolate, peppermint, acid-based foods   - previous endoscopy  reports reviewed   - schedule for EGD   - follow up in clinic after completion of above studies   - call clinic at any time for questions or new / worsened sx      Follow Up:   Return in about 6 weeks (around 5/1/2023).    Plan of care reviewed with the patient at the conclusion of today's visit.  Education was provided regarding diagnosis, management, and any prescribed or recommended OTC medications.  Patient verbalized understanding of and agreement with management plan.     NOTE TO PATIENT: The 21st Century Cures Act makes medical notes like these available to patients in the interest of transparency. However, be advised this is a medical document. It is intended as peer to peer communication. It is written in medical language and may contain abbreviations or verbiage that are unfamiliar. It may appear blunt or direct. Medical documents are intended to carry relevant information, facts as evident, and the clinical opinion of the practitioner.     Time Statement:   Discussed plan of care in detail with patient today. Patient verbally understands and agrees. I have spent 45 minutes reviewing available diagnostics, obtaining history, examining the patient, developing a treatment plan, and educating the patient on disease process and plan of care.    Roxy Lewis PA-C   [facility]

## 2023-03-21 ENCOUNTER — TELEPHONE (OUTPATIENT)
Dept: GASTROENTEROLOGY | Facility: CLINIC | Age: 68
End: 2023-03-21
Payer: MEDICARE

## 2023-03-21 NOTE — TELEPHONE ENCOUNTER
----- Message from Kt Davis sent at 3/20/2023  2:24 PM EDT -----  Regarding: Esophagus Dilation  Contact: [phone]  Luke Ramsey,  Just checking to see if there is a problem scheduling my April 6th 9:30 appointment with Dr. Wang??  I received [employer] messages about the follow-up appointment on May 30th with you but there is no [employer] entry for the April 6th procedure.  Also, I have not yet been called to schedule the esophagus x-ray.

## 2023-03-21 NOTE — TELEPHONE ENCOUNTER
I spoke with Mr Germain for a better understanding concerning Procedue and Esophgram complete appointment.

## 2023-03-27 ENCOUNTER — LAB (OUTPATIENT)
Dept: LAB | Facility: HOSPITAL | Age: 68
End: 2023-03-27
Payer: MEDICARE

## 2023-03-27 ENCOUNTER — OFFICE VISIT (OUTPATIENT)
Dept: FAMILY MEDICINE CLINIC | Facility: CLINIC | Age: 68
End: 2023-03-27
Payer: MEDICARE

## 2023-03-27 VITALS
TEMPERATURE: 98 F | SYSTOLIC BLOOD PRESSURE: 132 MMHG | OXYGEN SATURATION: 98 % | BODY MASS INDEX: 29.03 KG/M2 | HEART RATE: 84 BPM | WEIGHT: 196 LBS | DIASTOLIC BLOOD PRESSURE: 78 MMHG | HEIGHT: 69 IN

## 2023-03-27 DIAGNOSIS — Z12.5 ENCOUNTER FOR SCREENING FOR MALIGNANT NEOPLASM OF PROSTATE: ICD-10-CM

## 2023-03-27 DIAGNOSIS — Z00.00 MEDICARE ANNUAL WELLNESS VISIT, SUBSEQUENT: ICD-10-CM

## 2023-03-27 DIAGNOSIS — Z00.00 MEDICARE ANNUAL WELLNESS VISIT, SUBSEQUENT: Primary | ICD-10-CM

## 2023-03-27 DIAGNOSIS — E55.9 VITAMIN D DEFICIENCY: ICD-10-CM

## 2023-03-27 DIAGNOSIS — E78.2 MIXED HYPERLIPIDEMIA: ICD-10-CM

## 2023-03-27 DIAGNOSIS — I10 ESSENTIAL HYPERTENSION: ICD-10-CM

## 2023-03-27 LAB
BASOPHILS # BLD AUTO: 0.05 10*3/MM3 (ref 0–0.2)
BASOPHILS NFR BLD AUTO: 0.6 % (ref 0–1.5)
DEPRECATED RDW RBC AUTO: 43.3 FL (ref 37–54)
EOSINOPHIL # BLD AUTO: 0.33 10*3/MM3 (ref 0–0.4)
EOSINOPHIL NFR BLD AUTO: 4.2 % (ref 0.3–6.2)
ERYTHROCYTE [DISTWIDTH] IN BLOOD BY AUTOMATED COUNT: 12.6 % (ref 12.3–15.4)
HCT VFR BLD AUTO: 42.3 % (ref 37.5–51)
HGB BLD-MCNC: 14.6 G/DL (ref 13–17.7)
IMM GRANULOCYTES # BLD AUTO: 0.04 10*3/MM3 (ref 0–0.05)
IMM GRANULOCYTES NFR BLD AUTO: 0.5 % (ref 0–0.5)
LYMPHOCYTES # BLD AUTO: 1.68 10*3/MM3 (ref 0.7–3.1)
LYMPHOCYTES NFR BLD AUTO: 21.5 % (ref 19.6–45.3)
MCH RBC QN AUTO: 32.6 PG (ref 26.6–33)
MCHC RBC AUTO-ENTMCNC: 34.5 G/DL (ref 31.5–35.7)
MCV RBC AUTO: 94.4 FL (ref 79–97)
MONOCYTES # BLD AUTO: 0.97 10*3/MM3 (ref 0.1–0.9)
MONOCYTES NFR BLD AUTO: 12.4 % (ref 5–12)
NEUTROPHILS NFR BLD AUTO: 4.73 10*3/MM3 (ref 1.7–7)
NEUTROPHILS NFR BLD AUTO: 60.8 % (ref 42.7–76)
NRBC BLD AUTO-RTO: 0 /100 WBC (ref 0–0.2)
PLATELET # BLD AUTO: 237 10*3/MM3 (ref 140–450)
PMV BLD AUTO: 9.7 FL (ref 6–12)
RBC # BLD AUTO: 4.48 10*6/MM3 (ref 4.14–5.8)
WBC NRBC COR # BLD: 7.8 10*3/MM3 (ref 3.4–10.8)

## 2023-03-27 PROCEDURE — 85025 COMPLETE CBC W/AUTO DIFF WBC: CPT

## 2023-03-27 PROCEDURE — G0103 PSA SCREENING: HCPCS

## 2023-03-27 PROCEDURE — 3075F SYST BP GE 130 - 139MM HG: CPT | Performed by: FAMILY MEDICINE

## 2023-03-27 PROCEDURE — 80053 COMPREHEN METABOLIC PANEL: CPT

## 2023-03-27 PROCEDURE — 1159F MED LIST DOCD IN RCRD: CPT | Performed by: FAMILY MEDICINE

## 2023-03-27 PROCEDURE — 1160F RVW MEDS BY RX/DR IN RCRD: CPT | Performed by: FAMILY MEDICINE

## 2023-03-27 PROCEDURE — 80061 LIPID PANEL: CPT

## 2023-03-27 PROCEDURE — G0439 PPPS, SUBSEQ VISIT: HCPCS | Performed by: FAMILY MEDICINE

## 2023-03-27 PROCEDURE — 1170F FXNL STATUS ASSESSED: CPT | Performed by: FAMILY MEDICINE

## 2023-03-27 PROCEDURE — 3078F DIAST BP <80 MM HG: CPT | Performed by: FAMILY MEDICINE

## 2023-03-27 PROCEDURE — 82306 VITAMIN D 25 HYDROXY: CPT

## 2023-03-27 RX ORDER — AMLODIPINE BESYLATE 5 MG/1
5 TABLET ORAL DAILY
Qty: 90 TABLET | Refills: 3 | Status: SHIPPED | OUTPATIENT
Start: 2023-03-27

## 2023-03-27 NOTE — PROGRESS NOTES
The ABCs of the Annual Wellness Visit  Subsequent Medicare Wellness Visit    Subjective    Kt Davis is a 68 y.o. male who presents for a Subsequent Medicare Wellness Visit.    The following portions of the patient's history were reviewed and   updated as appropriate: allergies, current medications, past family history, past medical history, past social history, past surgical history and problem list.    Compared to one year ago, the patient feels his physical   health is better.    Compared to one year ago, the patient feels his mental   health is the same.    Recent Hospitalizations:  He was not admitted to the hospital during the last year.       Current Medical Providers:  Patient Care Team:  Tani Vazquez MD as PCP - General (Family Medicine)  Davin Swenson MD as Consulting Physician (Orthopedic Surgery)  Roxy Lewis PA-C as Physician Assistant (Gastroenterology)    Outpatient Medications Prior to Visit   Medication Sig Dispense Refill   • DEXTROMETHORPHAN-GUAIFENESIN PO Take  by mouth.     • multivitamin with minerals tablet tablet Take 1 tablet by mouth Daily.     • omeprazole (priLOSEC) 20 MG capsule Take 1 capsule by mouth Daily. 30 capsule 5   • amLODIPine (NORVASC) 5 MG tablet Take 1 tablet by mouth Daily.       No facility-administered medications prior to visit.       No opioid medication identified on active medication list. I have reviewed chart for other potential  high risk medication/s and harmful drug interactions in the elderly.          Aspirin is not on active medication list.  Aspirin use is not indicated based on review of current medical condition/s. Risk of harm outweighs potential benefits.  .    Patient Active Problem List   Diagnosis   • SDH (subdural hematoma)   • SAH (subarachnoid hemorrhage) (HCC)   • Fall due to ice or snow   • Blunt head trauma due to fall   • Traumatic subarachnoid hemorrhage with loss of consciousness of 30 minutes or less (HCC)   •  "Essential hypertension   • Inguinal hernia of left side without obstruction or gangrene   • Medicare annual wellness visit, subsequent   • Inguinal hernia   • Encounter for screening for malignant neoplasm of prostate    • Chronic right hip pain   • Chronic pain of both knees   • Avascular necrosis of hip, right (HCC)   • Quadriceps weakness   • Leg heaviness   • Nevus   • Primary osteoarthritis of right hip   • Status post total replacement of right hip   • Primary osteoarthritis of left hip   • Status post total hip replacement, left   • Vitamin D deficiency   • Mixed hyperlipidemia     Advance Care Planning  Advance Directive is on file.  ACP discussion was held with the patient during this visit. Patient has an advance directive in EMR which is still valid.      Objective    Vitals:    03/27/23 1303 03/27/23 1333   BP: 140/80 132/78   BP Location:  Left arm   Patient Position:  Sitting   Cuff Size:  Adult   Pulse: 84    Temp: 98 °F (36.7 °C)    SpO2: 98%    Weight: 88.9 kg (196 lb)    Height: 175.3 cm (69.02\")    PainSc: 0-No pain      Estimated body mass index is 28.93 kg/m² as calculated from the following:    Height as of this encounter: 175.3 cm (69.02\").    Weight as of this encounter: 88.9 kg (196 lb).    BMI is >= 25 and <30. (Overweight) The following options were offered after discussion;: weight loss educational material (shared in after visit summary), exercise counseling/recommendations and nutrition counseling/recommendations      Does the patient have evidence of cognitive impairment?   No            HEALTH RISK ASSESSMENT    Smoking Status:  Social History     Tobacco Use   Smoking Status Never   Smokeless Tobacco Never     Alcohol Consumption:  Social History     Substance and Sexual Activity   Alcohol Use No     Fall Risk Screen:    STEADI Fall Risk Assessment was completed, and patient is at LOW risk for falls.Assessment completed on:3/27/2023    Depression Screening:  PHQ-2/PHQ-9 Depression " Screening 3/27/2023   Little Interest or Pleasure in Doing Things 0-->not at all   Feeling Down, Depressed or Hopeless 0-->not at all   PHQ-9: Brief Depression Severity Measure Score 0       Health Habits and Functional and Cognitive Screening:  Functional & Cognitive Status 3/27/2023   Do you have difficulty preparing food and eating? No   Do you have difficulty bathing yourself, getting dressed or grooming yourself? No   Do you have difficulty using the toilet? No   Do you have difficulty moving around from place to place? No   Do you have trouble with steps or getting out of a bed or a chair? No   Current Diet Well Balanced Diet   Dental Exam Up to date   Eye Exam Up to date   Exercise (times per week) 3 times per week   Current Exercises Include Walking   Current Exercise Activities Include -   Do you need help using the phone?  No   Are you deaf or do you have serious difficulty hearing?  No   Do you need help with transportation? No   Do you need help shopping? No   Do you need help preparing meals?  No   Do you need help with housework?  No   Do you need help with laundry? No   Do you need help taking your medications? No   Do you need help managing money? No   Do you ever drive or ride in a car without wearing a seat belt? No   Have you felt unusual stress, anger or loneliness in the last month? No   Who do you live with? Spouse   If you need help, do you have trouble finding someone available to you? No   Have you been bothered in the last four weeks by sexual problems? No   Do you have difficulty concentrating, remembering or making decisions? No       Age-appropriate Screening Schedule:  Refer to the list below for future screening recommendations based on patient's age, sex and/or medical conditions. Orders for these recommended tests are listed in the plan section. The patient has been provided with a written plan.    Health Maintenance   Topic Date Due   • ANNUAL WELLNESS VISIT  03/09/2023   • LIPID  PANEL  03/27/2023   • TDAP/TD VACCINES (2 - Td or Tdap) 03/27/2023 (Originally 1/1/2023)   • COLORECTAL CANCER SCREENING  12/02/2025   • HEPATITIS C SCREENING  Completed   • COVID-19 Vaccine  Completed   • INFLUENZA VACCINE  Completed   • Pneumococcal Vaccine 65+  Completed   • ZOSTER VACCINE  Completed                CMS Preventative Services Quick Reference  Risk Factors Identified During Encounter:    Immunizations Discussed/Encouraged: Tdap    The above risks/problems have been discussed with the patient.  Pertinent information has been shared with the patient in the After Visit Summary.    Diagnoses and all orders for this visit:    1. Medicare annual wellness visit, subsequent (Primary)  Assessment & Plan:  The patient is here for health maintenance visit.  Currently, the patient consumes a healthy diet and has an adequate exercise regimen.  Screening lab work is ordered.  Immunizations were reviewed today.  Advice and education was given regarding nutrition, aerobic exercise, routine dental evaluations, routine eye exams, reproductive health, cardiovascular risk reduction, sunscreen use, self skin examination (annual dermatology evaluations) and seatbelt use (general overall safety).  Further recommendations will be given if needed after lab evaluation.  Annual wellness evaluation is recommended.      Orders:  -     Comprehensive Metabolic Panel; Future  -     CBC & Differential; Future  -     Lipid Panel; Future  -     PSA Screen; Future  -     Vitamin D,25-Hydroxy; Future    2. Essential hypertension  -     Comprehensive Metabolic Panel; Future  -     CBC & Differential; Future  -     Lipid Panel; Future  -     amLODIPine (NORVASC) 5 MG tablet; Take 1 tablet by mouth Daily.  Dispense: 90 tablet; Refill: 3    3. Encounter for screening for malignant neoplasm of prostate   -     PSA Screen; Future    4. Vitamin D deficiency  -     Vitamin D,25-Hydroxy; Future    5. Mixed hyperlipidemia  -     Comprehensive  Metabolic Panel; Future  -     CBC & Differential; Future  -     Lipid Panel; Future      Follow Up:   Next Medicare Wellness visit to be scheduled in 1 year.      An After Visit Summary and PPPS were made available to the patient.

## 2023-03-27 NOTE — PATIENT INSTRUCTIONS
"BMI for Adults  What is BMI?  Body mass index (BMI) is a number that is calculated from a person's weight and height. BMI can help estimate how much of a person's weight is composed of fat. BMI does not measure body fat directly. Rather, it is an alternative to procedures that directly measure body fat, which can be difficult and expensive.  BMI can help identify people who may be at higher risk for certain medical problems.  What are BMI measurements used for?  BMI is used as a screening tool to identify possible weight problems. It helps determine whether a person is obese, overweight, a healthy weight, or underweight.  BMI is useful for:  Identifying a weight problem that may be related to a medical condition or may increase the risk for medical problems.  Promoting changes, such as changes in diet and exercise, to help reach a healthy weight. BMI screening can be repeated to see if these changes are working.  How is BMI calculated?  BMI involves measuring your weight in relation to your height. Both height and weight are measured, and the BMI is calculated from those numbers. This can be done either in English ([place] or metric measurements. Note that charts and online BMI calculators are available to help you find your BMI quickly and easily without having to do these calculations yourself.  To calculate your BMI in English ([place] measurements:    Measure your weight in pounds (lb).  Multiply the number of pounds by 703.  For example, for a person who weighs 180 lb, multiply that number by 703, which equals 126,540.  Measure your height in inches. Then multiply that number by itself to get a measurement called \"inches squared.\"  For example, for a person who is 70 inches tall, the \"inches squared\" measurement is 70 inches x 70 inches, which equals 4,900 inches squared.  Divide the total from step 2 (number of lb x 703) by the total from step 3 (inches squared): 126,540 ÷ 4,900 = 25.8. This is your BMI.  To " "calculate your BMI in metric measurements:  Measure your weight in kilograms (kg).  Measure your height in meters (m). Then multiply that number by itself to get a measurement called \"meters squared.\"  For example, for a person who is 1.75 m tall, the \"meters squared\" measurement is 1.75 m x 1.75 m, which is equal to 3.1 meters squared.  Divide the number of kilograms (your weight) by the meters squared number. In this example: 70 ÷ 3.1 = 22.6. This is your BMI.  What do the results mean?  BMI charts are used to identify whether you are underweight, normal weight, overweight, or obese. The following guidelines will be used:  Underweight: BMI less than 18.5.  Normal weight: BMI between 18.5 and 24.9.  Overweight: BMI between 25 and 29.9.  Obese: BMI of 30 or above.  Keep these notes in mind:  Weight includes both fat and muscle, so someone with a muscular build, such as an athlete, may have a BMI that is higher than 24.9. In cases like these, BMI is not an accurate measure of body fat.  To determine if excess body fat is the cause of a BMI of 25 or higher, further assessments may need to be done by a health care provider.  BMI is usually interpreted in the same way for men and women.  Where to find more information  For more information about BMI, including tools to quickly calculate your BMI, go to these websites:  Centers for Disease Control and Prevention: www.cdc.gov  American Heart Association: www.heart.org  National Heart, Lung, and [employer] [place]: www.nhlbi.nih.gov  Summary  Body mass index (BMI) is a number that is calculated from a person's weight and height.  BMI may help estimate how much of a person's weight is composed of fat. BMI can help identify those who may be at higher risk for certain medical problems.  BMI can be measured using English measurements or metric measurements.  BMI charts are used to identify whether you are underweight, normal weight, overweight, or obese.  This information is not " "intended to replace advice given to you by your health care provider. Make sure you discuss any questions you have with your health care provider.  Document Revised: 09/09/2020 Document Reviewed: 07/17/2020  Elsevier Patient Education © 2022 [employer]  Hypertension, Adult  High blood pressure (hypertension) is when the force of blood pumping through the arteries is too strong. The arteries are the blood vessels that carry blood from the heart throughout the body. Hypertension forces the heart to work harder to pump blood and may cause arteries to become narrow or stiff. Untreated or uncontrolled hypertension can cause a heart attack, heart failure, a stroke, kidney disease, and other problems.  A blood pressure reading consists of a higher number over a lower number. Ideally, your blood pressure should be below 120/80. The first (\"top\") number is called the systolic pressure. It is a measure of the pressure in your arteries as your heart beats. The second (\"bottom\") number is called the diastolic pressure. It is a measure of the pressure in your arteries as the heart relaxes.  What are the causes?  The exact cause of this condition is not known. There are some conditions that result in or are related to high blood pressure.  What increases the risk?  Some risk factors for high blood pressure are under your control. The following factors may make you more likely to develop this condition:  Smoking.  Having type 2 diabetes mellitus, high cholesterol, or both.  Not getting enough exercise or physical activity.  Being overweight.  Having too much fat, sugar, calories, or salt (sodium) in your diet.  Drinking too much alcohol.  Some risk factors for high blood pressure may be difficult or impossible to change. Some of these factors include:  Having chronic kidney disease.  Having a family history of high blood pressure.  Age. Risk increases with age.  Race. You may be at higher risk if you are  " American.  Gender. Men are at higher risk than women before age 45. After age 65, women are at higher risk than men.  Having obstructive sleep apnea.  Stress.  What are the signs or symptoms?  High blood pressure may not cause symptoms. Very high blood pressure (hypertensive crisis) may cause:  Headache.  Anxiety.  Shortness of breath.  Nosebleed.  Nausea and vomiting.  Vision changes.  Severe chest pain.  Seizures.  How is this diagnosed?  This condition is diagnosed by measuring your blood pressure while you are seated, with your arm resting on a flat surface, your legs uncrossed, and your feet flat on the floor. The cuff of the blood pressure monitor will be placed directly against the skin of your upper arm at the level of your heart. It should be measured at least twice using the same arm. Certain conditions can cause a difference in blood pressure between your right and left arms.  Certain factors can cause blood pressure readings to be lower or higher than normal for a short period of time:  When your blood pressure is higher when you are in a health care provider's office than when you are at home, this is called white coat hypertension. Most people with this condition do not need medicines.  When your blood pressure is higher at home than when you are in a health care provider's office, this is called masked hypertension. Most people with this condition may need medicines to control blood pressure.  If you have a high blood pressure reading during one visit or you have normal blood pressure with other risk factors, you may be asked to:  Return on a different day to have your blood pressure checked again.  Monitor your blood pressure at home for 1 week or longer.  If you are diagnosed with hypertension, you may have other blood or imaging tests to help your health care provider understand your overall risk for other conditions.  How is this treated?  This condition is treated by making healthy lifestyle  changes, such as eating healthy foods, exercising more, and reducing your alcohol intake. Your health care provider may prescribe medicine if lifestyle changes are not enough to get your blood pressure under control, and if:  Your systolic blood pressure is above 130.  Your diastolic blood pressure is above 80.  Your personal target blood pressure may vary depending on your medical conditions, your age, and other factors.  Follow these instructions at home:  Eating and drinking    Eat a diet that is high in fiber and potassium, and low in sodium, added sugar, and fat. An example eating plan is called the DASH (Dietary Approaches to Stop Hypertension) diet. To eat this way:  Eat plenty of fresh fruits and vegetables. Try to fill one half of your plate at each meal with fruits and vegetables.  Eat whole grains, such as whole-wheat pasta, brown rice, or whole-grain bread. Fill about one fourth of your plate with whole grains.  Eat or drink low-fat dairy products, such as skim milk or low-fat yogurt.  Avoid fatty cuts of meat, processed or cured meats, and poultry with skin. Fill about one fourth of your plate with lean proteins, such as fish, chicken without skin, beans, eggs, or tofu.  Avoid pre-made and processed foods. These tend to be higher in sodium, added sugar, and fat.  Reduce your daily sodium intake. Most people with hypertension should eat less than 1,500 mg of sodium a day.  Do not drink alcohol if:  Your health care provider tells you not to drink.  You are pregnant, may be pregnant, or are planning to become pregnant.  If you drink alcohol:  Limit how much you use to:  0-1 drink a day for women.  0-2 drinks a day for men.  Be aware of how much alcohol is in your drink. In the [place] one drink equals one 12 oz bottle of beer (355 mL), one 5 oz glass of wine (148 mL), or one 1½ oz glass of hard liquor (44 mL).  Lifestyle    Work with your health care provider to maintain a healthy body weight or to lose  weight. Ask what an ideal weight is for you.  Get at least 30 minutes of exercise most days of the week. Activities may include walking, swimming, or biking.  Include exercise to strengthen your muscles (resistance exercise), such as Pilates or lifting weights, as part of your weekly exercise routine. Try to do these types of exercises for 30 minutes at least 3 days a week.  Do not use any products that contain nicotine or tobacco, such as cigarettes, e-cigarettes, and chewing tobacco. If you need help quitting, ask your health care provider.  Monitor your blood pressure at home as told by your health care provider.  Keep all follow-up visits as told by your health care provider. This is important.  Medicines  Take over-the-counter and prescription medicines only as told by your health care provider. Follow directions carefully. Blood pressure medicines must be taken as prescribed.  Do not skip doses of blood pressure medicine. Doing this puts you at risk for problems and can make the medicine less effective.  Ask your health care provider about side effects or reactions to medicines that you should watch for.  Contact a health care provider if you:  Think you are having a reaction to a medicine you are taking.  Have headaches that keep coming back (recurring).  Feel dizzy.  Have swelling in your ankles.  Have trouble with your vision.  Get help right away if you:  Develop a severe headache or confusion.  Have unusual weakness or numbness.  Feel faint.  Have severe pain in your chest or abdomen.  Vomit repeatedly.  Have trouble breathing.  Summary  Hypertension is when the force of blood pumping through your arteries is too strong. If this condition is not controlled, it may put you at risk for serious complications.  Your personal target blood pressure may vary depending on your medical conditions, your age, and other factors. For most people, a normal blood pressure is less than 120/80.  Hypertension is treated  with lifestyle changes, medicines, or a combination of both. Lifestyle changes include losing weight, eating a healthy, low-sodium diet, exercising more, and limiting alcohol.  This information is not intended to replace advice given to you by your health care provider. Make sure you discuss any questions you have with your health care provider.  Document Revised: 2019 Document Reviewed: 2019  [employer] Patient Education ©  [employer] Inc.    Medicare Wellness  Personal Prevention Plan of Service     Date of Office Visit:    Encounter Provider:  Tani Vazquez MD  Place of Service:  [facility]  Patient Name: Kt Davis  :  1955    As part of the Medicare Wellness portion of your visit today, we are providing you with this personalized preventive plan of services (PPPS). This plan is based upon recommendations of the [place] Preventive Services Task Force (USPSTF) and the Advisory Committee on Immunization Practices (ACIP).    This lists the preventive care services that should be considered, and provides dates of when you are due. Items listed as completed are up-to-date and do not require any further intervention.    Health Maintenance   Topic Date Due    ANNUAL WELLNESS VISIT  2023    LIPID PANEL  2023    TDAP/TD VACCINES (2 - Td or Tdap) 2023 (Originally 2023)    COLORECTAL CANCER SCREENING  2025    HEPATITIS C SCREENING  Completed    COVID-19 Vaccine  Completed    INFLUENZA VACCINE  Completed    Pneumococcal Vaccine 65+  Completed    ZOSTER VACCINE  Completed       Orders Placed This Encounter   Procedures    Comprehensive Metabolic Panel     Standing Status:   Future     Order Specific Question:   Release to patient     Answer:   Routine Release    Lipid Panel     Standing Status:   Future    PSA Screen     Standing Status:   Future     Order Specific Question:   Release to patient     Answer:   Routine Release     Vitamin D,25-Hydroxy     Standing Status:   Future     Order Specific Question:   Release to patient     Answer:   Routine Release    CBC & Differential     Standing Status:   Future     Order Specific Question:   Manual Differential     Answer:   No       Return in about 1 year (around 3/27/2024) for Medicare Wellness.

## 2023-03-28 LAB
25(OH)D3 SERPL-MCNC: 34.5 NG/ML (ref 30–100)
ALBUMIN SERPL-MCNC: 4.2 G/DL (ref 3.5–5.2)
ALBUMIN/GLOB SERPL: 1.5 G/DL
ALP SERPL-CCNC: 82 U/L (ref 39–117)
ALT SERPL W P-5'-P-CCNC: 22 U/L (ref 1–41)
ANION GAP SERPL CALCULATED.3IONS-SCNC: 12.9 MMOL/L (ref 5–15)
AST SERPL-CCNC: 20 U/L (ref 1–40)
BILIRUB SERPL-MCNC: 0.3 MG/DL (ref 0–1.2)
BUN SERPL-MCNC: 13 MG/DL (ref 8–23)
BUN/CREAT SERPL: 11 (ref 7–25)
CALCIUM SPEC-SCNC: 9.6 MG/DL (ref 8.6–10.5)
CHLORIDE SERPL-SCNC: 102 MMOL/L (ref 98–107)
CHOLEST SERPL-MCNC: 178 MG/DL (ref 0–200)
CO2 SERPL-SCNC: 26.1 MMOL/L (ref 22–29)
CREAT SERPL-MCNC: 1.18 MG/DL (ref 0.76–1.27)
EGFRCR SERPLBLD CKD-EPI 2021: 66.8 ML/MIN/1.73
GLOBULIN UR ELPH-MCNC: 2.8 GM/DL
GLUCOSE SERPL-MCNC: 72 MG/DL (ref 65–99)
HDLC SERPL-MCNC: 32 MG/DL (ref 40–60)
LDLC SERPL CALC-MCNC: 98 MG/DL (ref 0–100)
LDLC/HDLC SERPL: 2.78 {RATIO}
POTASSIUM SERPL-SCNC: 4.5 MMOL/L (ref 3.5–5.2)
PROT SERPL-MCNC: 7 G/DL (ref 6–8.5)
PSA SERPL-MCNC: 0.7 NG/ML (ref 0–4)
SODIUM SERPL-SCNC: 141 MMOL/L (ref 136–145)
TRIGL SERPL-MCNC: 285 MG/DL (ref 0–150)
VLDLC SERPL-MCNC: 48 MG/DL (ref 5–40)

## 2023-04-04 ENCOUNTER — HOSPITAL ENCOUNTER (OUTPATIENT)
Dept: GENERAL RADIOLOGY | Facility: HOSPITAL | Age: 68
Discharge: HOME | End: 2023-04-04
Admitting: PHYSICIAN ASSISTANT
Payer: MEDICARE

## 2023-04-04 DIAGNOSIS — R13.19 ESOPHAGEAL DYSPHAGIA: ICD-10-CM

## 2023-04-04 PROCEDURE — 74220 X-RAY XM ESOPHAGUS 1CNTRST: CPT

## 2023-04-04 RX ADMIN — BARIUM SULFATE 183 ML: 960 POWDER, FOR SUSPENSION ORAL at 13:38

## 2023-04-06 NOTE — PROGRESS NOTES
Please let Marcellus know that his esophagram reveals mild esophageal dysmotility. He has a small diverticulum in his mid esophagus. Moderate gastroesophageal reflux. Small sliding hiatal hernia and Schatzki's ring. These will be further evaluated at time of his endoscopy 4/10. Thanks!

## 2023-04-10 ENCOUNTER — LAB REQUISITION (OUTPATIENT)
Dept: LAB | Facility: HOSPITAL | Age: 68
End: 2023-04-10
Payer: MEDICARE

## 2023-04-10 ENCOUNTER — OUTSIDE FACILITY SERVICE (OUTPATIENT)
Dept: GASTROENTEROLOGY | Facility: CLINIC | Age: 68
End: 2023-04-10
Payer: MEDICARE

## 2023-04-10 DIAGNOSIS — K21.00 GASTRO-ESOPHAGEAL REFLUX DISEASE WITH ESOPHAGITIS, WITHOUT BLEEDING: ICD-10-CM

## 2023-04-10 DIAGNOSIS — R13.19 OTHER DYSPHAGIA: ICD-10-CM

## 2023-04-10 DIAGNOSIS — K22.89 OTHER SPECIFIED DISEASE OF ESOPHAGUS: ICD-10-CM

## 2023-04-10 DIAGNOSIS — K44.9 DIAPHRAGMATIC HERNIA WITHOUT OBSTRUCTION OR GANGRENE: ICD-10-CM

## 2023-04-10 PROCEDURE — 88305 TISSUE EXAM BY PATHOLOGIST: CPT | Performed by: INTERNAL MEDICINE

## 2023-04-10 PROCEDURE — 43249 ESOPH EGD DILATION <30 MM: CPT | Performed by: INTERNAL MEDICINE

## 2023-04-10 PROCEDURE — 43239 EGD BIOPSY SINGLE/MULTIPLE: CPT | Performed by: INTERNAL MEDICINE

## 2023-04-12 DIAGNOSIS — K21.00 GASTROESOPHAGEAL REFLUX DISEASE WITH ESOPHAGITIS, UNSPECIFIED WHETHER HEMORRHAGE: Primary | ICD-10-CM

## 2023-04-12 LAB
CYTO UR: NORMAL
LAB AP CASE REPORT: NORMAL
LAB AP CLINICAL INFORMATION: NORMAL
LAB AP DIAGNOSIS COMMENT: NORMAL
PATH REPORT.FINAL DX SPEC: NORMAL
PATH REPORT.GROSS SPEC: NORMAL

## 2023-04-12 RX ORDER — OMEPRAZOLE 40 MG/1
40 CAPSULE, DELAYED RELEASE ORAL DAILY
Qty: 30 CAPSULE | Refills: 3 | Status: SHIPPED | OUTPATIENT
Start: 2023-04-12

## 2023-04-13 NOTE — PROGRESS NOTES
Per Dr. Wang:  There were changes of EOE endoscopically and biopsies do show increased eosinophils.  I did perform a dilation.  The patient will need to be on a PPI

## 2023-05-30 ENCOUNTER — OFFICE VISIT (OUTPATIENT)
Dept: GASTROENTEROLOGY | Facility: CLINIC | Age: 68
End: 2023-05-30

## 2023-05-30 VITALS
HEIGHT: 69 IN | DIASTOLIC BLOOD PRESSURE: 90 MMHG | TEMPERATURE: 97.7 F | OXYGEN SATURATION: 97 % | BODY MASS INDEX: 29.09 KG/M2 | HEART RATE: 77 BPM | SYSTOLIC BLOOD PRESSURE: 140 MMHG | WEIGHT: 196.4 LBS

## 2023-05-30 DIAGNOSIS — K20.0 ESOPHAGITIS, EOSINOPHILIC: Primary | ICD-10-CM

## 2023-05-30 PROCEDURE — 1160F RVW MEDS BY RX/DR IN RCRD: CPT | Performed by: PHYSICIAN ASSISTANT

## 2023-05-30 PROCEDURE — 3077F SYST BP >= 140 MM HG: CPT | Performed by: PHYSICIAN ASSISTANT

## 2023-05-30 PROCEDURE — 1159F MED LIST DOCD IN RCRD: CPT | Performed by: PHYSICIAN ASSISTANT

## 2023-05-30 PROCEDURE — 99214 OFFICE O/P EST MOD 30 MIN: CPT | Performed by: PHYSICIAN ASSISTANT

## 2023-05-30 PROCEDURE — 3080F DIAST BP >= 90 MM HG: CPT | Performed by: PHYSICIAN ASSISTANT

## 2023-05-30 NOTE — PROGRESS NOTES
Follow Up      Patient Name: Kt Davis  : 1955   MRN: [id number]     Chief Complaint:  No chief complaint on file.      History of Present Illness: Kt Davis is a 68 y.o. male who is here today for post procedure follow up.    EGD 4/10 with Dr. Wang. Mucosal changes including longitudinal furrows in upper and middle third of esophagus. EoE-EREFS: 0 / 1 / 0 / 1 / none. Bx reveals moderate increase in intramucosal eosinophils (2-8 (distal), 4-12 (mid) per HPF), negative for intestinal metaplasia or dysplasia. Small hiatal hernia. LA Grade B esophagitis. Dilation with CRE balloon to 16.5mm. Normal stomach and duodenum.     Pt has been doing well s/p dilation. He denies recurrent dysphagia sx. He is taking omeprazole 40mg daily but is interested in pursuing lower dose of such if possible. Patient denies associated fever, chills, abdominal pain, indigestion, nausea, vomiting, diarrhea, constipation, hematemesis, hematochezia, melena, bloating, weight loss or gain, dysuria, jaundice or bruising.    Subjective      Review of Systems:   Review of Systems   Constitutional: Negative for appetite change, chills, diaphoresis, fatigue, fever, unexpected weight gain and unexpected weight loss.   HENT: Negative for drooling, facial swelling, mouth sores, nosebleeds, rhinorrhea, sore throat, swollen glands, tinnitus and trouble swallowing.    Eyes: Negative.    Respiratory: Negative for choking, chest tightness and shortness of breath.    Gastrointestinal: Negative for abdominal pain, anal bleeding, blood in stool, constipation, diarrhea, nausea, vomiting, GERD and indigestion.   Endocrine: Negative.    Genitourinary: Negative for dysuria, flank pain and hematuria.   Musculoskeletal: Negative for arthralgias, back pain, myalgias and neck pain.   Skin: Negative for color change, dry skin, pallor and bruise.   Neurological: Negative for dizziness, tremors, syncope, weakness and numbness.    Psychiatric/Behavioral: Negative for decreased concentration, hallucinations and self-injury. The patient is not nervous/anxious.    All other systems reviewed and are negative.      Medications:     Current Outpatient Medications:   •  amLODIPine (NORVASC) 5 MG tablet, Take 1 tablet by mouth Daily., Disp: 90 tablet, Rfl: 3  •  DEXTROMETHORPHAN-GUAIFENESIN PO, Take  by mouth., Disp: , Rfl:   •  multivitamin with minerals tablet tablet, Take 1 tablet by mouth Daily., Disp: , Rfl:   •  omeprazole (priLOSEC) 40 MG capsule, Take 1 capsule by mouth Daily., Disp: 30 capsule, Rfl: 3    Allergies:   Allergies   Allergen Reactions   • Lisinopril Cough   • Valsartan Cough       Social History:   Social History     Socioeconomic History   • Marital status:      Spouse name: Shena   • Number of children: 2   • Years of education: College   Tobacco Use   • Smoking status: Never   • Smokeless tobacco: Never   Vaping Use   • Vaping Use: Never used   Substance and Sexual Activity   • Alcohol use: No   • Drug use: No   • Sexual activity: Yes     Partners: Female     Birth control/protection: None     Comment:         Surgical History:   Past Surgical History:   Procedure Laterality Date   • APPENDECTOMY  1999   • COLONOSCOPY  2015   • ESOPHAGEAL DILATATION  2015   • HERNIA REPAIR     • HIP SURGERY  9/12/2022   • INGUINAL HERNIA REPAIR Bilateral 06/18/2020    Procedure: INGUINAL HERNIA REPAIR BILATERAL WITH MESH;  Surgeon: Johnathan Durant MD;  Location:  [facility];  Service: General;  Laterality: Bilateral;   • INGUINAL HERNIA REPAIR     • JOINT REPLACEMENT  09/12/2022   • TONSILLECTOMY     • TONSILLECTOMY AND ADENOIDECTOMY  1959   • TOTAL HIP ARTHROPLASTY Right 09/12/2022    Procedure: TOTAL HIP ARTHROPLASTY RIGHT;  Surgeon: Davin Swenson MD;  Location:  [facility];  Service: Orthopedics;  Laterality: Right;   • TOTAL HIP ARTHROPLASTY Left 10/17/2022    Procedure: TOTAL HIP ARTHROPLASTY LEFT;  Surgeon:  Davin Swenson MD;  Location: [facility];  Service: Orthopedics;  Laterality: Left;   • UPPER GASTROINTESTINAL ENDOSCOPY  2015        Medical History:   Past Medical History:   Diagnosis Date   • Arthritis    • Hip arthrosis 01/2021   • History of esophageal stricture 2015   • History of gastric ulcer 2017   • History of medical problems Concussion and brain bleed   • History of transfusion 2017    4 units due to GI bleed, no reactions   • Hypertension    • Inguinal hernia    • Knee swelling 01/2022   • Mixed hyperlipidemia 3/27/2023   • Peptic ulceration    • Post-traumatic brain syndrome 2018    r/t falling outside in winter. 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  There is no height or weight on file to calculate BMI.     Physical Exam  Vitals and nursing note reviewed.   Constitutional:       General: He is not in acute distress.     Appearance: Normal appearance. He is not ill-appearing or diaphoretic.   HENT:      Head: Normocephalic and atraumatic.      Right Ear: External ear normal.      Left Ear: External ear normal.      Nose: Nose normal. No rhinorrhea.      Mouth/Throat:      Mouth: Mucous membranes are moist.      Pharynx: Oropharynx is clear.   Eyes:      Conjunctiva/sclera: Conjunctivae normal.      Pupils: Pupils are equal, round, and reactive to light.   Cardiovascular:      Rate and Rhythm: Normal rate and regular rhythm.      Pulses: Normal pulses.      Heart sounds: Normal heart sounds.   Pulmonary:      Effort: Pulmonary effort is normal.      Breath sounds: Normal breath sounds.   Abdominal:      General: Abdomen is flat. There is no distension.      Tenderness: There is no abdominal tenderness. There is no guarding.   Musculoskeletal:         General: Normal range of motion.      Cervical back: Normal range of motion and neck supple.   Skin:     General: Skin is warm and dry.    Neurological:      General: No focal deficit present.      Mental Status: He is alert and oriented to person, place, and time.   Psychiatric:         Mood and Affect: Mood normal.         Assessment / Plan      Assessment/Plan:   There are no diagnoses linked to this encounter.     EoE  Esophageal dysphagia s/p dilation via EGD 4/2023   - continue omeprazole 40mg daily, consider dose escalation at time of next refill   - pt given GERD diet instructions, advised to avoid GI irritants such as caffeine, carbonation, EtOH, tobacco, chocolate, peppermint, acid-based foods   - discussed role of referral to allergist, pt declines at this time   - previous endoscopy and pathology reports reviewed   - follow up in clinic in 1 year, or after completion of above studies   - call clinic at any time for questions or new / worsened sx    Follow Up:   Return in about 1 year (around 5/30/2024).    Plan of care reviewed with the patient at the conclusion of today's visit.  Education was provided regarding diagnosis, management, and any prescribed or recommended OTC medications.  Patient verbalized understanding of and agreement with management plan.     NOTE TO PATIENT: The 21st Century Cures Act makes medical notes like these available to patients in the interest of transparency. However, be advised this is a medical document. It is intended as peer to peer communication. It is written in medical language and may contain abbreviations or verbiage that are unfamiliar. It may appear blunt or direct. Medical documents are intended to carry relevant information, facts as evident, and the clinical opinion of the practitioner.     Time Statement:   Discussed plan of care in detail with patient today. Patient verbally understands and agrees. I have spent 30 minutes reviewing available diagnostics, obtaining history, examining the patient, developing a treatment plan, and educating the patient on disease process and plan of care.      Roxy Lewis PA-C   [facility]

## 2023-08-06 DIAGNOSIS — K21.00 GASTROESOPHAGEAL REFLUX DISEASE WITH ESOPHAGITIS, UNSPECIFIED WHETHER HEMORRHAGE: ICD-10-CM

## 2023-08-07 DIAGNOSIS — K21.00 GASTROESOPHAGEAL REFLUX DISEASE WITH ESOPHAGITIS, UNSPECIFIED WHETHER HEMORRHAGE: Primary | ICD-10-CM

## 2023-08-07 DIAGNOSIS — K20.0 ESOPHAGITIS, EOSINOPHILIC: ICD-10-CM

## 2023-08-07 RX ORDER — OMEPRAZOLE 20 MG/1
20 CAPSULE, DELAYED RELEASE ORAL DAILY
Qty: 90 CAPSULE | Refills: 3 | Status: SHIPPED | OUTPATIENT
Start: 2023-08-07

## 2023-08-07 RX ORDER — OMEPRAZOLE 40 MG/1
CAPSULE, DELAYED RELEASE ORAL
Qty: 30 CAPSULE | Refills: 3 | Status: SHIPPED | OUTPATIENT
Start: 2023-08-07

## 2023-08-07 NOTE — TELEPHONE ENCOUNTER
Rx Refill Note  Requested Prescriptions     Pending Prescriptions Disp Refills    omeprazole (priLOSEC) 40 MG capsule [Pharmacy Med Name: OMEPRAZOLE DR 40 MG CAPSULE] 30 capsule 3     Sig: TAKE ONE CAPSULE BY MOUTH DAILY      Last office visit with prescribing clinician: Visit date not found   Last telemedicine visit with prescribing clinician: Visit date not found   Next office visit with prescribing clinician: Visit date not found                           CHEYANNE Sanchez  08/07/23, 09:55 EDT

## 2023-11-16 ENCOUNTER — OFFICE VISIT (OUTPATIENT)
Dept: ORTHOPEDIC SURGERY | Facility: CLINIC | Age: 68
End: 2023-11-16
Payer: MEDICARE

## 2023-11-16 VITALS
BODY MASS INDEX: 29.36 KG/M2 | HEIGHT: 69 IN | WEIGHT: 198.2 LBS | DIASTOLIC BLOOD PRESSURE: 90 MMHG | SYSTOLIC BLOOD PRESSURE: 160 MMHG

## 2023-11-16 DIAGNOSIS — Z96.642 STATUS POST TOTAL HIP REPLACEMENT, LEFT: Primary | ICD-10-CM

## 2023-11-16 DIAGNOSIS — Z96.641 STATUS POST TOTAL REPLACEMENT OF RIGHT HIP: ICD-10-CM

## 2023-11-16 NOTE — PROGRESS NOTES
Orthopaedic Clinic Note: Hip Established Patient    Chief Complaint   Patient presents with    Follow-up      9 month follow up --13 month status post Total Hip Arthroplasty Left 10/17/22; 14 months status post TOTAL HIP ARTHROPLASTY RIGHT  9/12/22        HPI    It has been 9  month(s) since 's last visit. He returns to clinic today for follow-up bilateral total hip arthroplasty.  He is little over 1 year out from bilateral total hip arthroplasties.  Rates his pain 0/10 on the pain scale.  He is ambulating with no assistive device.  Denies fevers chills or constitutional symptoms.  Overall he is happy with his outcome.    Past Medical History:   Diagnosis Date    Arthritis     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, concussion and brain bleed    Ulcer       Past Surgical History:   Procedure Laterality Date    APPENDECTOMY  1999    COLONOSCOPY  2015    ESOPHAGEAL DILATATION  2015    HERNIA REPAIR      HIP SURGERY  9/12/2022    INGUINAL HERNIA REPAIR Bilateral 06/18/2020    Procedure: INGUINAL HERNIA REPAIR BILATERAL WITH MESH;  Surgeon: Johnathan Durant MD;  Location: [facility];  Service: General;  Laterality: Bilateral;    INGUINAL HERNIA REPAIR      JOINT REPLACEMENT  09/12/2022    TONSILLECTOMY      TONSILLECTOMY AND ADENOIDECTOMY  1959    TOTAL HIP ARTHROPLASTY Right 09/12/2022    Procedure: TOTAL HIP ARTHROPLASTY RIGHT;  Surgeon: Davin Swenson MD;  Location: [facility];  Service: Orthopedics;  Laterality: Right;    TOTAL HIP ARTHROPLASTY Left 10/17/2022    Procedure: TOTAL HIP ARTHROPLASTY LEFT;   Surgeon: Davin Swenson MD;  Location: [facility];  Service: Orthopedics;  Laterality: Left;    UPPER GASTROINTESTINAL ENDOSCOPY  2015      Family History   Problem Relation Age of Onset    Colon cancer Mother     Cancer Mother         Colon Cancer at age 83    Osteoporosis Mother     Parkinsonism Father     Heart disease Father     Arthritis Father     Hyperlipidemia Father     Broken bones Father         Broken Leg    Arthritis Brother     Dysphagia Brother     Other Maternal Grandmother         Unknown    Heart block Maternal Grandfather     Macular degeneration Paternal Grandmother     Diverticulitis Paternal Grandfather     Emphysema Paternal Grandfather     Allergies Daughter     Asthma Daughter     No Known Problems Son      Social History     Socioeconomic History    Marital status:      Spouse name: Shena    Number of children: 2    Years of education: College   Tobacco Use    Smoking status: Never     Passive exposure: Never    Smokeless tobacco: Never   Vaping Use    Vaping Use: Never used   Substance and Sexual Activity    Alcohol use: No    Drug use: No    Sexual activity: Yes     Partners: Female     Birth control/protection: None     Comment:       Current Outpatient Medications on File Prior to Visit   Medication Sig Dispense Refill    amLODIPine (NORVASC) 5 MG tablet Take 1 tablet by mouth Daily. 90 tablet 3    DEXTROMETHORPHAN-GUAIFENESIN PO Take  by mouth.      multivitamin with minerals tablet tablet Take 1 tablet by mouth Daily.      omeprazole (priLOSEC) 40 MG capsule TAKE ONE CAPSULE BY MOUTH DAILY 30 capsule 3    [DISCONTINUED] omeprazole (priLOSEC) 20 MG capsule Take 1 capsule by mouth Daily. 90 capsule 3     No current facility-administered medications on file prior to visit.      Allergies   Allergen Reactions    Lisinopril Cough    Valsartan Cough        Review of Systems   Constitutional: Negative.    HENT: Negative.     Eyes: Negative.    Respiratory: Negative.    "  Cardiovascular: Negative.    Gastrointestinal: Negative.    Endocrine: Negative.    Genitourinary: Negative.    Musculoskeletal:  Positive for arthralgias.   Skin: Negative.    Allergic/Immunologic: Negative.    Neurological: Negative.    Hematological: Negative.    Psychiatric/Behavioral: Negative.          The patient's Review of Systems was personally reviewed and confirmed as accurate.    Physical Exam  Blood pressure 160/90, height 175.3 cm (69.02\"), weight 89.9 kg (198 lb 3.2 oz).    Body mass index is 29.26 kg/m².    GENERAL APPEARANCE: awake, alert, oriented, in no acute distress and well developed, well nourished  LUNGS:  breathing nonlabored  EXTREMITIES: no clubbing, cyanosis  PERIPHERAL PULSES: palpable dorsalis pedis and posterior tibial pulses bilaterally.    GAIT:  Antalgic            Hip Exam:  Bilateral     RANGE OF MOTION:  EXTENSION/FLEXION:  normal (0-110 degrees)  IR (at 90 degrees of flexion):  20  ER (at 90 degrees of flexion):  40  PAIN WITH HIP MOTION:  no  PAIN WITH LOGROLL:  no      STINCHFIELD TEST: negative     STRENGTH:  ABDUCTOR:    5/5  ADDUCTOR:  5/5  HIP FLEXION:  5/5     GREATER TROCHANTER BURSAL PAIN:  no    SENSATION TO LIGHT TOUCH:  DEEP PERONEAL/SUPERFICIAL PERONEAL/SURAL/SAPHENOUS/TIBIAL:   intact    EDEMA:  no  ERYTHEMA:  no  WOUNDS/INCISIONS:   yes, well healed surgical incision without evidence of erythema or drainage  _________________________________________________________________  _________________________________________________________________    RADIOGRAPHIC FINDINGS:   Indication: Status post bilateral total hip arthroplasty     Comparison: Todays xrays were compared to previous xrays from 2/9/2023    AP pelvis: Right: Demonstrate a well positioned total hip without evidence of wear, loosening, fracture or osteolysis, femoral head is concentrically reduced within the acetabulum and No significant changes compared to prior radiographs.;Left: Demonstrate a well " positioned total hip without evidence of wear, loosening, fracture or osteolysis, femoral head is concentrically reduced within the acetabulum and No significant changes compared to prior radiographs.      Assessment/Plan:   Diagnosis Plan   1. Status post total hip replacement, left  XR Pelvis 1 or 2 View      2. Status post total replacement of right hip  XR Pelvis 1 or 2 View        Patient doing well 1 year status post bilateral total hip arthroplasties.  I recommend activity as tolerated without restrictions.  We will see the patient back in 5 years for repeat assessment x-ray AP pelvis on return.  He is welcome follow-up sooner should problems arise.    I recommend prophylactic antibiotics prior to invasive procedures indefinitely to minimize risk of prosthetic joint infection.  Amoxicillin 2 g orally 1 hour prior to procedure is recommended.      Davin Swenson MD  11/16/23  13:49 EST

## 2023-12-03 DIAGNOSIS — K21.00 GASTROESOPHAGEAL REFLUX DISEASE WITH ESOPHAGITIS, UNSPECIFIED WHETHER HEMORRHAGE: ICD-10-CM

## 2023-12-04 RX ORDER — OMEPRAZOLE 40 MG/1
40 CAPSULE, DELAYED RELEASE ORAL DAILY
Qty: 90 CAPSULE | Refills: 3 | Status: SHIPPED | OUTPATIENT
Start: 2023-12-04

## 2024-04-04 ENCOUNTER — LAB (OUTPATIENT)
Dept: LAB | Facility: HOSPITAL | Age: 69
End: 2024-04-04
Payer: MEDICARE

## 2024-04-04 ENCOUNTER — OFFICE VISIT (OUTPATIENT)
Dept: FAMILY MEDICINE CLINIC | Facility: CLINIC | Age: 69
End: 2024-04-04
Payer: MEDICARE

## 2024-04-04 VITALS
TEMPERATURE: 98.6 F | HEIGHT: 69 IN | DIASTOLIC BLOOD PRESSURE: 82 MMHG | WEIGHT: 195.8 LBS | BODY MASS INDEX: 29 KG/M2 | HEART RATE: 78 BPM | RESPIRATION RATE: 21 BRPM | OXYGEN SATURATION: 96 % | SYSTOLIC BLOOD PRESSURE: 126 MMHG

## 2024-04-04 DIAGNOSIS — Z00.00 MEDICARE ANNUAL WELLNESS VISIT, SUBSEQUENT: ICD-10-CM

## 2024-04-04 DIAGNOSIS — M25.562 CHRONIC PAIN OF BOTH KNEES: ICD-10-CM

## 2024-04-04 DIAGNOSIS — E55.9 VITAMIN D DEFICIENCY: ICD-10-CM

## 2024-04-04 DIAGNOSIS — I10 ESSENTIAL HYPERTENSION: ICD-10-CM

## 2024-04-04 DIAGNOSIS — Z12.5 ENCOUNTER FOR SCREENING FOR MALIGNANT NEOPLASM OF PROSTATE: ICD-10-CM

## 2024-04-04 DIAGNOSIS — E78.2 MIXED HYPERLIPIDEMIA: ICD-10-CM

## 2024-04-04 DIAGNOSIS — Z12.11 COLON CANCER SCREENING: ICD-10-CM

## 2024-04-04 DIAGNOSIS — Z00.00 MEDICARE ANNUAL WELLNESS VISIT, SUBSEQUENT: Primary | ICD-10-CM

## 2024-04-04 DIAGNOSIS — M25.561 CHRONIC PAIN OF BOTH KNEES: ICD-10-CM

## 2024-04-04 DIAGNOSIS — G89.29 CHRONIC PAIN OF BOTH KNEES: ICD-10-CM

## 2024-04-04 PROBLEM — M87.051 AVASCULAR NECROSIS OF HIP, RIGHT: Status: RESOLVED | Noted: 2021-04-28 | Resolved: 2024-04-04

## 2024-04-04 PROBLEM — M25.551 CHRONIC RIGHT HIP PAIN: Status: RESOLVED | Noted: 2021-03-01 | Resolved: 2024-04-04

## 2024-04-04 PROBLEM — K40.90 INGUINAL HERNIA: Status: RESOLVED | Noted: 2020-06-18 | Resolved: 2024-04-04

## 2024-04-04 PROBLEM — I60.9 SAH (SUBARACHNOID HEMORRHAGE): Status: RESOLVED | Noted: 2018-01-01 | Resolved: 2024-04-04

## 2024-04-04 PROBLEM — M16.12 PRIMARY OSTEOARTHRITIS OF LEFT HIP: Status: RESOLVED | Noted: 2022-10-04 | Resolved: 2024-04-04
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
## 2024-04-04 PROBLEM — S09.8XXA BLUNT HEAD TRAUMA: Status: RESOLVED | Noted: 2018-01-01 | Resolved: 2024-04-04

## 2024-04-04 PROBLEM — R29.898 LEG HEAVINESS: Status: RESOLVED | Noted: 2021-09-13 | Resolved: 2024-04-04

## 2024-04-04 PROBLEM — M16.11 PRIMARY OSTEOARTHRITIS OF RIGHT HIP: Status: RESOLVED | Noted: 2022-09-01 | Resolved: 2024-04-04

## 2024-04-04 PROBLEM — S06.5XAA SDH (SUBDURAL HEMATOMA): Status: RESOLVED | Noted: 2018-01-01 | Resolved: 2024-04-04

## 2024-04-04 PROBLEM — S06.6X1A TRAUMATIC SUBARACHNOID HEMORRHAGE WITH LOSS OF CONSCIOUSNESS OF 30 MINUTES OR LESS: Status: RESOLVED | Noted: 2018-01-01 | Resolved: 2024-04-04

## 2024-04-04 PROBLEM — K40.90 INGUINAL HERNIA OF LEFT SIDE WITHOUT OBSTRUCTION OR GANGRENE: Status: RESOLVED | Noted: 2020-02-25 | Resolved: 2024-04-04

## 2024-04-04 LAB
25(OH)D3 SERPL-MCNC: 36.1 NG/ML (ref 30–100)
ALBUMIN SERPL-MCNC: 4.4 G/DL (ref 3.5–5.2)
ALBUMIN/GLOB SERPL: 1.6 G/DL
ALP SERPL-CCNC: 95 U/L (ref 39–117)
ALT SERPL W P-5'-P-CCNC: 26 U/L (ref 1–41)
ANION GAP SERPL CALCULATED.3IONS-SCNC: 11 MMOL/L (ref 5–15)
AST SERPL-CCNC: 19 U/L (ref 1–40)
BASOPHILS # BLD AUTO: 0.1 10*3/MM3 (ref 0–0.2)
BASOPHILS NFR BLD AUTO: 1.1 % (ref 0–1.5)
BILIRUB SERPL-MCNC: 0.4 MG/DL (ref 0–1.2)
BUN SERPL-MCNC: 14 MG/DL (ref 8–23)
BUN/CREAT SERPL: 11.4 (ref 7–25)
CALCIUM SPEC-SCNC: 9.7 MG/DL (ref 8.6–10.5)
CHLORIDE SERPL-SCNC: 105 MMOL/L (ref 98–107)
CHOLEST SERPL-MCNC: 187 MG/DL (ref 0–200)
CO2 SERPL-SCNC: 26 MMOL/L (ref 22–29)
CREAT SERPL-MCNC: 1.23 MG/DL (ref 0.76–1.27)
DEPRECATED RDW RBC AUTO: 41.6 FL (ref 37–54)
EGFRCR SERPLBLD CKD-EPI 2021: 63.2 ML/MIN/1.73
EOSINOPHIL # BLD AUTO: 0.95 10*3/MM3 (ref 0–0.4)
EOSINOPHIL NFR BLD AUTO: 10.2 % (ref 0.3–6.2)
ERYTHROCYTE [DISTWIDTH] IN BLOOD BY AUTOMATED COUNT: 12 % (ref 12.3–15.4)
GLOBULIN UR ELPH-MCNC: 2.8 GM/DL
GLUCOSE SERPL-MCNC: 80 MG/DL (ref 65–99)
HCT VFR BLD AUTO: 47.6 % (ref 37.5–51)
HDLC SERPL-MCNC: 39 MG/DL (ref 40–60)
HGB BLD-MCNC: 16.6 G/DL (ref 13–17.7)
IMM GRANULOCYTES # BLD AUTO: 0.03 10*3/MM3 (ref 0–0.05)
IMM GRANULOCYTES NFR BLD AUTO: 0.3 % (ref 0–0.5)
LDLC SERPL CALC-MCNC: 116 MG/DL (ref 0–100)
LDLC/HDLC SERPL: 2.87 {RATIO}
LYMPHOCYTES # BLD AUTO: 2.17 10*3/MM3 (ref 0.7–3.1)
LYMPHOCYTES NFR BLD AUTO: 23.4 % (ref 19.6–45.3)
MCH RBC QN AUTO: 32.5 PG (ref 26.6–33)
MCHC RBC AUTO-ENTMCNC: 34.9 G/DL (ref 31.5–35.7)
MCV RBC AUTO: 93.2 FL (ref 79–97)
MONOCYTES # BLD AUTO: 1.11 10*3/MM3 (ref 0.1–0.9)
MONOCYTES NFR BLD AUTO: 12 % (ref 5–12)
NEUTROPHILS NFR BLD AUTO: 4.92 10*3/MM3 (ref 1.7–7)
NEUTROPHILS NFR BLD AUTO: 53 % (ref 42.7–76)
NRBC BLD AUTO-RTO: 0 /100 WBC (ref 0–0.2)
PLATELET # BLD AUTO: 210 10*3/MM3 (ref 140–450)
PMV BLD AUTO: 9.8 FL (ref 6–12)
POTASSIUM SERPL-SCNC: 4.7 MMOL/L (ref 3.5–5.2)
PROT SERPL-MCNC: 7.2 G/DL (ref 6–8.5)
PSA SERPL-MCNC: 0.31 NG/ML (ref 0–4)
RBC # BLD AUTO: 5.11 10*6/MM3 (ref 4.14–5.8)
SODIUM SERPL-SCNC: 142 MMOL/L (ref 136–145)
TRIGL SERPL-MCNC: 180 MG/DL (ref 0–150)
VLDLC SERPL-MCNC: 32 MG/DL (ref 5–40)
WBC NRBC COR # BLD AUTO: 9.28 10*3/MM3 (ref 3.4–10.8)

## 2024-04-04 PROCEDURE — 80053 COMPREHEN METABOLIC PANEL: CPT

## 2024-04-04 PROCEDURE — 82306 VITAMIN D 25 HYDROXY: CPT

## 2024-04-04 PROCEDURE — 85025 COMPLETE CBC W/AUTO DIFF WBC: CPT

## 2024-04-04 PROCEDURE — G0103 PSA SCREENING: HCPCS

## 2024-04-04 PROCEDURE — 80061 LIPID PANEL: CPT

## 2024-04-04 RX ORDER — AMLODIPINE BESYLATE 5 MG/1
5 TABLET ORAL DAILY
Qty: 90 TABLET | Refills: 3 | Status: SHIPPED | OUTPATIENT
Start: 2024-04-04

## 2024-04-04 RX ORDER — DICLOFENAC SODIUM 75 MG/1
75 TABLET, DELAYED RELEASE ORAL 2 TIMES DAILY
Qty: 180 TABLET | Refills: 1 | Status: SHIPPED | OUTPATIENT
Start: 2024-04-04

## 2024-04-04 NOTE — PROGRESS NOTES
The ABCs of the Annual Wellness Visit  Subsequent Medicare Wellness Visit    Subjective      Kt Davis is a 69 y.o. male who presents for a Subsequent Medicare Wellness Visit.    The following portions of the patient's history were reviewed and   updated as appropriate: allergies, current medications, past family history, past medical history, past social history, past surgical history, and problem list.    Compared to one year ago, the patient feels his physical   health is better.    Compared to one year ago, the patient feels his mental   health is the same.    Recent Hospitalizations:  He was not admitted to the hospital during the last year.       Current Medical Providers:  Patient Care Team:  Tani Vazquez MD as PCP - General (Family Medicine)  Davin Swenson MD as Consulting Physician (Orthopedic Surgery)  Roxy Lewis PA-C as Physician Assistant (Gastroenterology)    Outpatient Medications Prior to Visit   Medication Sig Dispense Refill    multivitamin with minerals tablet tablet Take 1 tablet by mouth Daily.      omeprazole (priLOSEC) 40 MG capsule TAKE 1 CAPSULE BY MOUTH DAILY 90 capsule 3    amLODIPine (NORVASC) 5 MG tablet Take 1 tablet by mouth Daily. 90 tablet 3    benzonatate (TESSALON) 200 MG capsule Take 1 capsule by mouth 3 (Three) Times a Day As Needed for Cough. 30 capsule 0    doxycycline (MONODOX) 100 MG capsule Take 1 capsule by mouth 2 (Two) Times a Day. 20 capsule 0    methylPREDNISolone (MEDROL) 4 MG dose pack Take as directed on package instructions. 21 tablet 0    promethazine-dextromethorphan (PROMETHAZINE-DM) 6.25-15 MG/5ML syrup Take 5 mL by mouth At Night As Needed for Cough. 120 mL 0     No facility-administered medications prior to visit.       No opioid medication identified on active medication list. I have reviewed chart for other potential  high risk medication/s and harmful drug interactions in the elderly.        Aspirin is not on active medication  "list.  Aspirin use is not indicated based on review of current medical condition/s. Risk of harm outweighs potential benefits.  .    Patient Active Problem List   Diagnosis    Essential hypertension    Medicare annual wellness visit, subsequent    Encounter for screening for malignant neoplasm of prostate     Chronic pain of both knees    Quadriceps weakness    Nevus    Status post total replacement of right hip    Status post total hip replacement, left    Vitamin D deficiency    Mixed hyperlipidemia    Colon cancer screening     Advance Care Planning   Advance Care Planning     Advance Directive is on file.  ACP discussion was held with the patient during this visit. Patient has an advance directive in EMR which is still valid.      Objective    Vitals:    24 1021   BP: 126/82   BP Location: Left arm   Patient Position: Sitting   Cuff Size: Adult   Pulse: 78   Resp: 21   Temp: 98.6 °F (37 °C)   TempSrc: Infrared   SpO2: 96%   Weight: 88.8 kg (195 lb 12.8 oz)   Height: 175.3 cm (69\")     Estimated body mass index is 28.91 kg/m² as calculated from the following:    Height as of this encounter: 175.3 cm (69\").    Weight as of this encounter: 88.8 kg (195 lb 12.8 oz).    BMI is >= 25 and <30. (Overweight) The following options were offered after discussion;: weight loss educational material (shared in after visit summary), exercise counseling/recommendations, and nutrition counseling/recommendations      Does the patient have evidence of cognitive impairment?   No            HEALTH RISK ASSESSMENT    Smoking Status:  Social History     Tobacco Use   Smoking Status Never    Passive exposure: Never   Smokeless Tobacco Never     Alcohol Consumption:  Social History     Substance and Sexual Activity   Alcohol Use No     Fall Risk Screen:    STEADI Fall Risk Assessment was completed, and patient is at LOW risk for falls.Assessment completed on:2024    Depression Screenin/4/2024    10:18 AM   PHQ-2/PHQ-9 " Depression Screening   Little Interest or Pleasure in Doing Things 0-->not at all   Feeling Down, Depressed or Hopeless 0-->not at all   PHQ-9: Brief Depression Severity Measure Score 0       Health Habits and Functional and Cognitive Screenin/4/2024    10:19 AM   Functional & Cognitive Status   Do you have difficulty preparing food and eating? No   Do you have difficulty bathing yourself, getting dressed or grooming yourself? No   Do you have difficulty using the toilet? No   Do you have difficulty moving around from place to place? No   Do you have trouble with steps or getting out of a bed or a chair? No   Current Diet Well Balanced Diet   Dental Exam Up to date   Eye Exam Up to date   Exercise (times per week) 2 times per week   Current Exercises Include Walking   Do you need help using the phone?  No   Are you deaf or do you have serious difficulty hearing?  No   Do you need help to go to places out of walking distance? No   Do you need help shopping? No   Do you need help preparing meals?  No   Do you need help with housework?  No   Do you need help with laundry? No   Do you need help taking your medications? No   Do you need help managing money? No   Do you ever drive or ride in a car without wearing a seat belt? No   Have you felt unusual stress, anger or loneliness in the last month? No   Who do you live with? Spouse   If you need help, do you have trouble finding someone available to you? No   Have you been bothered in the last four weeks by sexual problems? No   Do you have difficulty concentrating, remembering or making decisions? No       Age-appropriate Screening Schedule:  Refer to the list below for future screening recommendations based on patient's age, sex and/or medical conditions. Orders for these recommended tests are listed in the plan section. The patient has been provided with a written plan.    Health Maintenance   Topic Date Due    ANNUAL WELLNESS VISIT  2024    LIPID PANEL   03/27/2024    INFLUENZA VACCINE  08/01/2024    BMI FOLLOWUP  04/04/2025    COLORECTAL CANCER SCREENING  12/02/2025    TDAP/TD VACCINES (4 - Td or Tdap) 07/03/2033    HEPATITIS C SCREENING  Completed    COVID-19 Vaccine  Completed    RSV Vaccine - Adults  Completed    Pneumococcal Vaccine 65+  Completed    ZOSTER VACCINE  Completed                  CMS Preventative Services Quick Reference  Risk Factors Identified During Encounter:    Chronic Pain: Natural history and expected course discussed. Questions answered.    The above risks/problems have been discussed with the patient.  Pertinent information has been shared with the patient in the After Visit Summary.    Diagnoses and all orders for this visit:    1. Medicare annual wellness visit, subsequent (Primary)  Assessment & Plan:  The patient is here for health maintenance visit.  Currently, the patient consumes a healthy diet and has an adequate exercise regimen.  Screening lab work is ordered.  Immunizations were reviewed today.  Advice and education was given regarding nutrition, aerobic exercise, routine dental evaluations, routine eye exams, reproductive health, cardiovascular risk reduction, sunscreen use, self skin examination (annual dermatology evaluations) and seatbelt use (general overall safety).  Further recommendations will be given if needed after lab evaluation.  Annual wellness evaluation is recommended.      Orders:  -     Comprehensive Metabolic Panel; Future  -     CBC & Differential; Future  -     Lipid Panel; Future  -     PSA Screen; Future  -     Vitamin D,25-Hydroxy; Future    2. Mixed hyperlipidemia  -     Comprehensive Metabolic Panel; Future  -     Lipid Panel; Future    3. Essential hypertension  -     Comprehensive Metabolic Panel; Future  -     CBC & Differential; Future  -     Lipid Panel; Future  -     amLODIPine (NORVASC) 5 MG tablet; Take 1 tablet by mouth Daily.  Dispense: 90 tablet; Refill: 3    4. Vitamin D deficiency  -      Vitamin D,25-Hydroxy; Future    5. Encounter for screening for malignant neoplasm of prostate   -     PSA Screen; Future    6. Chronic pain of both knees  -     diclofenac (VOLTAREN) 75 MG EC tablet; Take 1 tablet by mouth 2 (Two) Times a Day.  Dispense: 180 tablet; Refill: 1    7. Colon cancer screening  -     Ambulatory Referral For Screening Colonoscopy        Follow Up:   Next Medicare Wellness visit to be scheduled in 1 year.      An After Visit Summary and PPPS were made available to the patient.

## 2024-04-04 NOTE — PATIENT INSTRUCTIONS
"BMI for Adults  Body mass index (BMI) is a number found using a person's weight and height. BMI can help tell how much of a person's weight is made up of fat. BMI does not measure body fat directly. It is used instead of tests that directly measure body fat, which can be difficult and expensive.  What are BMI measurements used for?  BMI is useful to:  Find out if your weight puts you at higher risk for medical problems.  Help recommend changes, such as in diet and exercise. This can help you reach a healthy weight. BMI screening can be done again to see if these changes are working.  How is BMI calculated?  Your height and weight are measured. The BMI is found from those numbers. This can be done with [place] or metric measurements. Note that charts and online BMI calculators are available to help you find your BMI quickly and easily without doing these calculations.  To calculate your BMI in [place] measurements:  Measure your weight in pounds (lb).  Multiply the number of pounds by 703.  So, for an adult who weighs 150 lb, multiply that number by 703: 150 x 703, which equals 105,450.  Measure your height in inches. Then multiply that number by itself to get a measurement called \"inches squared.\"  So, for an adult who is 70 inches tall, the \"inches squared\" measurement is 70 inches x 70 inches, which equals 4,900 inches squared.  Divide the total from step 2 (number of lb x 703) by the total from step 3 (inches squared): 105,450 ÷ 4,900 = 21.5. This is your BMI.  To calculate your BMI in metric measurements:    Measure your weight in kilograms (kg).  For this example, the weight is 70 kg.  Measure your height in meters (m). Then multiply that number by itself to get a measurement called \"meters squared.\"  So, for an adult who is 1.75 m tall, the \"meters squared\" measurement is 1.75 m x 1.75 m, which equals 3.1 meters squared.  Divide the number of kilograms (your weight) by the meters squared number. In this example: 70 " ÷ 3.1 = 22.6. This is your BMI.  What do the results mean?  BMI charts are used to see if you are underweight, normal weight, overweight, or obese. The following guidelines will be used:  Underweight: BMI less than 18.5.  Normal weight: BMI between 18.5 and 24.9.  Overweight: BMI between 25 and 29.9.  Obese: BMI of 30 or above.  BMI is a tool and cannot diagnose a condition. Talk with your health care provider about what your BMI means for you. Keep these notes in mind:  Weight includes fat and muscle. Someone with a muscular build, such as an athlete, may have a BMI that is higher than 24.9. In cases like these, BMI is not a correct measure of body fat.  If you have a BMI of 25 or higher, your provider may need to do more testing to find out if excess body fat is the cause.  BMI is measured the same way for males and females. Females usually have more body fat than males of the same height and weight.  Where to find more information  For more information about BMI, including tools to quickly find your BMI, go to:  Centers for Disease Control and Prevention: cdc.gov  American Heart Association: heart.org  National Heart, Lung, and Blood Commerce: nhlbi.nih.gov  This information is not intended to replace advice given to you by your health care provider. Make sure you discuss any questions you have with your health care provider.  Document Revised: 09/07/2023 Document Reviewed: 08/31/2023  [employer] Patient Education © 2023 [employer]  Hypertension, Adult  High blood pressure (hypertension) is when the force of blood pumping through the arteries is too strong. The arteries are the blood vessels that carry blood from the heart throughout the body. Hypertension forces the heart to work harder to pump blood and may cause arteries to become narrow or stiff. Untreated or uncontrolled hypertension can lead to a heart attack, heart failure, a stroke, kidney disease, and other problems.  A blood pressure reading consists  "of a higher number over a lower number. Ideally, your blood pressure should be below 120/80. The first (\"top\") number is called the systolic pressure. It is a measure of the pressure in your arteries as your heart beats. The second (\"bottom\") number is called the diastolic pressure. It is a measure of the pressure in your arteries as the heart relaxes.  What are the causes?  The exact cause of this condition is not known. There are some conditions that result in high blood pressure.  What increases the risk?  Certain factors may make you more likely to develop high blood pressure. Some of these risk factors are under your control, including:  Smoking.  Not getting enough exercise or physical activity.  Being overweight.  Having too much fat, sugar, calories, or salt (sodium) in your diet.  Drinking too much alcohol.  Other risk factors include:  Having a personal history of heart disease, diabetes, high cholesterol, or kidney disease.  Stress.  Having a family history of high blood pressure and high cholesterol.  Having obstructive sleep apnea.  Age. The risk increases with age.  What are the signs or symptoms?  High blood pressure may not cause symptoms. Very high blood pressure (hypertensive crisis) may cause:  Headache.  Fast or irregular heartbeats (palpitations).  Shortness of breath.  Nosebleed.  Nausea and vomiting.  Vision changes.  Severe chest pain, dizziness, and seizures.  How is this diagnosed?  This condition is diagnosed by measuring your blood pressure while you are seated, with your arm resting on a flat surface, your legs uncrossed, and your feet flat on the floor. The cuff of the blood pressure monitor will be placed directly against the skin of your upper arm at the level of your heart. Blood pressure should be measured at least twice using the same arm. Certain conditions can cause a difference in blood pressure between your right and left arms.  If you have a high blood pressure reading during " one visit or you have normal blood pressure with other risk factors, you may be asked to:  Return on a different day to have your blood pressure checked again.  Monitor your blood pressure at home for 1 week or longer.  If you are diagnosed with hypertension, you may have other blood or imaging tests to help your health care provider understand your overall risk for other conditions.  How is this treated?  This condition is treated by making healthy lifestyle changes, such as eating healthy foods, exercising more, and reducing your alcohol intake. You may be referred for counseling on a healthy diet and physical activity.  Your health care provider may prescribe medicine if lifestyle changes are not enough to get your blood pressure under control and if:  Your systolic blood pressure is above 130.  Your diastolic blood pressure is above 80.  Your personal target blood pressure may vary depending on your medical conditions, your age, and other factors.  Follow these instructions at home:  Eating and drinking    Eat a diet that is high in fiber and potassium, and low in sodium, added sugar, and fat. An example of this eating plan is called the DASH diet. DASH stands for Dietary Approaches to Stop Hypertension. To eat this way:  Eat plenty of fresh fruits and vegetables. Try to fill one half of your plate at each meal with fruits and vegetables.  Eat whole grains, such as whole-wheat pasta, brown rice, or whole-grain bread. Fill about one fourth of your plate with whole grains.  Eat or drink low-fat dairy products, such as skim milk or low-fat yogurt.  Avoid fatty cuts of meat, processed or cured meats, and poultry with skin. Fill about one fourth of your plate with lean proteins, such as fish, chicken without skin, beans, eggs, or tofu.  Avoid pre-made and processed foods. These tend to be higher in sodium, added sugar, and fat.  Reduce your daily sodium intake. Many people with hypertension should eat less than 1,500  mg of sodium a day.  Do not drink alcohol if:  Your health care provider tells you not to drink.  You are pregnant, may be pregnant, or are planning to become pregnant.  If you drink alcohol:  Limit how much you have to:  0-1 drink a day for women.  0-2 drinks a day for men.  Know how much alcohol is in your drink. In the [place] one drink equals one 12 oz bottle of beer (355 mL), one 5 oz glass of wine (148 mL), or one 1½ oz glass of hard liquor (44 mL).  Lifestyle    Work with your health care provider to maintain a healthy body weight or to lose weight. Ask what an ideal weight is for you.  Get at least 30 minutes of exercise that causes your heart to beat faster (aerobic exercise) most days of the week. Activities may include walking, swimming, or biking.  Include exercise to strengthen your muscles (resistance exercise), such as Pilates or lifting weights, as part of your weekly exercise routine. Try to do these types of exercises for 30 minutes at least 3 days a week.  Do not use any products that contain nicotine or tobacco. These products include cigarettes, chewing tobacco, and vaping devices, such as e-cigarettes. If you need help quitting, ask your health care provider.  Monitor your blood pressure at home as told by your health care provider.  Keep all follow-up visits. This is important.  Medicines  Take over-the-counter and prescription medicines only as told by your health care provider. Follow directions carefully. Blood pressure medicines must be taken as prescribed.  Do not skip doses of blood pressure medicine. Doing this puts you at risk for problems and can make the medicine less effective.  Ask your health care provider about side effects or reactions to medicines that you should watch for.  Contact a health care provider if you:  Think you are having a reaction to a medicine you are taking.  Have headaches that keep coming back (recurring).  Feel dizzy.  Have swelling in your ankles.  Have  trouble with your vision.  Get help right away if you:  Develop a severe headache or confusion.  Have unusual weakness or numbness.  Feel faint.  Have severe pain in your chest or abdomen.  Vomit repeatedly.  Have trouble breathing.  These symptoms may be an emergency. Get help right away. Call 911.  Do not wait to see if the symptoms will go away.  Do not drive yourself to the hospital.  Summary  Hypertension is when the force of blood pumping through your arteries is too strong. If this condition is not controlled, it may put you at risk for serious complications.  Your personal target blood pressure may vary depending on your medical conditions, your age, and other factors. For most people, a normal blood pressure is less than 120/80.  Hypertension is treated with lifestyle changes, medicines, or a combination of both. Lifestyle changes include losing weight, eating a healthy, low-sodium diet, exercising more, and limiting alcohol.  This information is not intended to replace advice given to you by your health care provider. Make sure you discuss any questions you have with your health care provider.  Document Revised: 10/25/2022 Document Reviewed: 10/25/2022  [employer] Patient Education ©  [employer]    Medicare Wellness  Personal Prevention Plan of Service     Date of Office Visit:    Encounter Provider:  Tani Vazquez MD  Place of Service:  [facility]  Patient Name: Kt Davis  :  1955    As part of the Medicare Wellness portion of your visit today, we are providing you with this personalized preventive plan of services (PPPS). This plan is based upon recommendations of the [place] Preventive Services Task Force (USPSTF) and the Advisory Committee on Immunization Practices (ACIP).    This lists the preventive care services that should be considered, and provides dates of when you are due. Items listed as completed are up-to-date and do not require  any further intervention.    Health Maintenance   Topic Date Due    ANNUAL WELLNESS VISIT  03/27/2024    LIPID PANEL  03/27/2024    BMI FOLLOWUP  03/27/2024    INFLUENZA VACCINE  08/01/2024    COLORECTAL CANCER SCREENING  12/02/2025    TDAP/TD VACCINES (4 - Td or Tdap) 07/03/2033    HEPATITIS C SCREENING  Completed    COVID-19 Vaccine  Completed    RSV Vaccine - Adults  Completed    Pneumococcal Vaccine 65+  Completed    ZOSTER VACCINE  Completed       No orders of the defined types were placed in this encounter.      Return in about 1 year (around 4/4/2025) for Medicare Wellness.

## 2024-06-15 ENCOUNTER — PATIENT ROUNDING (BHMG ONLY) (OUTPATIENT)
Dept: URGENT CARE | Facility: CLINIC | Age: 69
End: 2024-06-15
Payer: MEDICARE

## 2025-01-08 DIAGNOSIS — K21.00 GASTROESOPHAGEAL REFLUX DISEASE WITH ESOPHAGITIS, UNSPECIFIED WHETHER HEMORRHAGE: ICD-10-CM

## 2025-01-08 RX ORDER — OMEPRAZOLE 40 MG/1
40 CAPSULE, DELAYED RELEASE ORAL DAILY
Qty: 90 CAPSULE | Refills: 3 | Status: SHIPPED | OUTPATIENT
Start: 2025-01-08

## 2025-01-15 ENCOUNTER — TELEPHONE (OUTPATIENT)
Dept: GASTROENTEROLOGY | Facility: CLINIC | Age: 70
End: 2025-01-15

## 2025-01-15 NOTE — TELEPHONE ENCOUNTER
"Name: Kt Davis \"LOKI\"    Relationship: Self    Best Callback Number: [phone]    Patient would like to Reschedule their appointment. Unable to schedule within the 3 week timeframe.     PATIENT STATED APPOINTMENT 01/07/2025 WAS CANCELED BY OFFICE DUE TO BAD WEATHER. NEXT AVAILABLE IS MARCH, PATIENT WOULD LIKE TO BE SCHEDULED BEFORE THEN.  Please call patient.    "

## 2025-03-27 ENCOUNTER — OFFICE VISIT (OUTPATIENT)
Dept: GASTROENTEROLOGY | Facility: CLINIC | Age: 70
End: 2025-03-27
Payer: MEDICARE

## 2025-03-27 VITALS
DIASTOLIC BLOOD PRESSURE: 88 MMHG | HEART RATE: 79 BPM | HEIGHT: 69 IN | OXYGEN SATURATION: 97 % | SYSTOLIC BLOOD PRESSURE: 140 MMHG | TEMPERATURE: 97.8 F | WEIGHT: 200.4 LBS | BODY MASS INDEX: 29.68 KG/M2

## 2025-03-27 DIAGNOSIS — K20.0 ESOPHAGITIS, EOSINOPHILIC: ICD-10-CM

## 2025-03-27 DIAGNOSIS — K21.00 GASTROESOPHAGEAL REFLUX DISEASE WITH ESOPHAGITIS, UNSPECIFIED WHETHER HEMORRHAGE: Primary | ICD-10-CM

## 2025-03-27 NOTE — PROGRESS NOTES
Follow Up      Patient Name: Kt Davis  : 1955   MRN: [id number]     Chief Complaint:  No chief complaint on file.      History of Present Illness: Kt Davis is a 70 y.o. male who is here today for follow up on GERD, EoE.     Pt continues to do well with omeprazole 40mg daily without breakthrough indigestion or esophageal dysphagia. Patient denies associated fever, chills, abdominal pain, nausea, vomiting, diarrhea, constipation, hematemesis, hematochezia, melena, bloating, weight loss or gain, dysuria, jaundice or bruising.    EGD 2023 with Dr. Wang. Mucosal changes including longitudinal furrows in upper and middle third of esophagus. EoE-EREFS: 0 / 1 / 0 / 1 / none. Bx reveals moderate increase in intramucosal eosinophils (2-8 (distal), 4-12 (mid) per HPF), negative for intestinal metaplasia or dysplasia. Small hiatal hernia. LA Grade B esophagitis. Dilation with CRE balloon to 16.5mm. Normal stomach and duodenum.     Subjective      Review of Systems:   Review of Systems   Constitutional:  Negative for appetite change, chills, diaphoresis, fatigue, fever, unexpected weight gain and unexpected weight loss.   HENT:  Negative for drooling, facial swelling, mouth sores, nosebleeds, rhinorrhea, sore throat, swollen glands, tinnitus and trouble swallowing.    Eyes: Negative.    Respiratory:  Negative for choking, chest tightness and shortness of breath.    Gastrointestinal:  Negative for abdominal pain, anal bleeding, blood in stool, constipation, diarrhea, nausea, vomiting, GERD and indigestion.   Endocrine: Negative.    Genitourinary:  Negative for dysuria, flank pain and hematuria.   Musculoskeletal:  Negative for arthralgias, back pain, myalgias and neck pain.   Skin:  Negative for color change, dry skin, pallor and bruise.   Neurological:  Negative for dizziness, tremors, syncope, weakness and numbness.   Psychiatric/Behavioral:  Negative for decreased concentration, hallucinations  and self-injury. The patient is not nervous/anxious.    All other systems reviewed and are negative.      Medications:     Current Outpatient Medications:     amLODIPine (NORVASC) 5 MG tablet, Take 1 tablet by mouth Daily., Disp: 90 tablet, Rfl: 3    benzonatate (TESSALON) 100 MG capsule, Take 1 capsule by mouth 3 (Three) Times a Day As Needed for Cough., Disp: 30 capsule, Rfl: 0    diclofenac (VOLTAREN) 75 MG EC tablet, Take 1 tablet by mouth 2 (Two) Times a Day., Disp: 180 tablet, Rfl: 1    methylPREDNISolone (MEDROL) 4 MG dose pack, Take as directed on package instructions., Disp: 21 tablet, Rfl: 0    multivitamin with minerals tablet tablet, Take 1 tablet by mouth Daily., Disp: , Rfl:     omeprazole (priLOSEC) 40 MG capsule, Take 1 capsule by mouth Daily., Disp: 90 capsule, Rfl: 3    Allergies:   Allergies   Allergen Reactions    Lisinopril Cough    Valsartan Cough       Social History:   Social History     Socioeconomic History    Marital status:      Spouse name: Shena    Number of children: 2    Years of education: College   Tobacco Use    Smoking status: Never     Passive exposure: Never    Smokeless tobacco: Never   Vaping Use    Vaping status: Never Used   Substance and Sexual Activity    Alcohol use: No    Drug use: No    Sexual activity: Yes     Partners: Female     Birth control/protection: None     Comment:         Surgical History:   Past Surgical History:   Procedure Laterality Date    APPENDECTOMY  1999    COLONOSCOPY  2015    ESOPHAGEAL DILATATION  2015    HERNIA REPAIR      HIP SURGERY  9/12/2022    INGUINAL HERNIA REPAIR Bilateral 06/18/2020    Procedure: INGUINAL HERNIA REPAIR BILATERAL WITH MESH;  Surgeon: Johnathan Duratn MD;  [facility];  Service: General;  Laterality: Bilateral;    INGUINAL HERNIA REPAIR      JOINT REPLACEMENT  09/12/2022    TONSILLECTOMY      TONSILLECTOMY AND ADENOIDECTOMY  1959    TOTAL HIP ARTHROPLASTY Right 09/12/2022    Procedure: TOTAL  HIP ARTHROPLASTY RIGHT;  Surgeon: Davin Swenson MD;  Location: [facility] OR;  Service: Orthopedics;  Laterality: Right;    TOTAL HIP ARTHROPLASTY Left 10/17/2022    Procedure: TOTAL HIP ARTHROPLASTY LEFT;  Surgeon: Davin Swenson MD;  Location: [facility] OR;  Service: Orthopedics;  Laterality: Left;    UPPER GASTROINTESTINAL ENDOSCOPY  2015        Medical History:   Past Medical History:   Diagnosis Date    Arthritis     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  There is no height or weight on file to calculate BMI.     Physical Exam  Vitals and nursing note reviewed.   Constitutional:       General: He is not in acute distress.     Appearance: Normal appearance. He is not ill-appearing or diaphoretic.      Comments: BMI 29.58   HENT:      Head: Normocephalic and atraumatic.      Right Ear: External ear normal.      Left Ear: External ear normal.      Nose: Nose normal.      Mouth/Throat:      Mouth: Mucous membranes are moist.      Pharynx: Oropharynx is clear.   Eyes:      Conjunctiva/sclera: Conjunctivae normal.      Pupils: Pupils are equal, round, and reactive to light.   Cardiovascular:      Rate and Rhythm: Normal rate and regular rhythm.      Pulses: Normal pulses.      Heart sounds: Normal heart sounds.   Pulmonary:      Effort: Pulmonary effort is normal.      Breath sounds: Normal breath sounds.   Abdominal:      General: Abdomen is flat. There is no distension.      Tenderness: There  is no abdominal tenderness. There is no guarding or rebound.   Musculoskeletal:         General: Normal range of motion.      Cervical back: Normal range of motion.   Skin:     General: Skin is warm and dry.      Coloration: Skin is not jaundiced or pale.   Neurological:      General: No focal deficit present.      Mental Status: He is alert and oriented to person, place, and time. Mental status is at baseline.   Psychiatric:         Mood and Affect: Mood normal.         Assessment / Plan      Assessment/Plan:   There are no diagnoses linked to this encounter.     EoE  Esophageal dysphagia s/p dilation via EGD 4/2023   - continue omeprazole 40mg daily, refills sent to pharmacy    - pt given GERD diet instructions, advised to avoid GI irritants such as caffeine, carbonation, EtOH, tobacco, chocolate, peppermint, acid-based foods   - previous office notes, hospital records, labs, imaging, endoscopy and pathology reports reviewed with patient    - follow up in clinic in 9mo, or after completion of above studies   - call clinic at any time for questions or new / worsened sx    Follow Up:   Return in about 9 months (around 12/27/2025).    Plan of care reviewed with the patient at the conclusion of today's visit.  Education was provided regarding diagnosis, management, and any prescribed or recommended OTC medications.  Patient verbalized understanding of and agreement with management plan.     NOTE TO PATIENT: The 21st Century Cures Act makes medical notes like these available to patients in the interest of transparency. However, be advised this is a medical document. It is intended as peer to peer communication. It is written in medical language and may contain abbreviations or verbiage that are unfamiliar. It may appear blunt or direct. Medical documents are intended to carry relevant information, facts as evident, and the clinical opinion of the practitioner.     Time Statement:   Discussed plan of care in detail with  patient today. Patient verbally understands and agrees. I have spent 30 minutes reviewing available diagnostics, obtaining history, examining the patient, developing a treatment plan, and educating the patient on disease process and plan of care.     Roxy Lewis PA-C   [facility]

## 2025-03-29 DIAGNOSIS — I10 ESSENTIAL HYPERTENSION: ICD-10-CM

## 2025-03-31 RX ORDER — AMLODIPINE BESYLATE 5 MG/1
5 TABLET ORAL DAILY
Qty: 90 TABLET | Refills: 3 | Status: SHIPPED | OUTPATIENT
Start: 2025-03-31

## 2025-04-09 ENCOUNTER — LAB (OUTPATIENT)
Dept: LAB | Facility: HOSPITAL | Age: 70
End: 2025-04-09
Payer: MEDICARE

## 2025-04-09 ENCOUNTER — OFFICE VISIT (OUTPATIENT)
Dept: FAMILY MEDICINE CLINIC | Facility: CLINIC | Age: 70
End: 2025-04-09
Payer: MEDICARE

## 2025-04-09 VITALS
BODY MASS INDEX: 28.94 KG/M2 | SYSTOLIC BLOOD PRESSURE: 144 MMHG | HEIGHT: 69 IN | TEMPERATURE: 98.2 F | WEIGHT: 195.4 LBS | DIASTOLIC BLOOD PRESSURE: 84 MMHG | HEART RATE: 80 BPM | OXYGEN SATURATION: 98 %

## 2025-04-09 DIAGNOSIS — Z00.00 MEDICARE ANNUAL WELLNESS VISIT, SUBSEQUENT: Primary | ICD-10-CM

## 2025-04-09 DIAGNOSIS — I10 ESSENTIAL HYPERTENSION: ICD-10-CM

## 2025-04-09 DIAGNOSIS — E55.9 VITAMIN D DEFICIENCY: ICD-10-CM

## 2025-04-09 DIAGNOSIS — M25.562 CHRONIC PAIN OF BOTH KNEES: ICD-10-CM

## 2025-04-09 DIAGNOSIS — Z12.5 ENCOUNTER FOR SCREENING FOR MALIGNANT NEOPLASM OF PROSTATE: ICD-10-CM

## 2025-04-09 DIAGNOSIS — G89.29 CHRONIC PAIN OF BOTH KNEES: ICD-10-CM

## 2025-04-09 DIAGNOSIS — M25.561 CHRONIC PAIN OF BOTH KNEES: ICD-10-CM

## 2025-04-09 DIAGNOSIS — E78.2 MIXED HYPERLIPIDEMIA: ICD-10-CM

## 2025-04-09 DIAGNOSIS — Z00.00 MEDICARE ANNUAL WELLNESS VISIT, SUBSEQUENT: ICD-10-CM

## 2025-04-09 LAB
BASOPHILS # BLD AUTO: 0.05 10*3/MM3 (ref 0–0.2)
BASOPHILS NFR BLD AUTO: 0.8 % (ref 0–1.5)
DEPRECATED RDW RBC AUTO: 42.1 FL (ref 37–54)
EOSINOPHIL # BLD AUTO: 0.31 10*3/MM3 (ref 0–0.4)
EOSINOPHIL NFR BLD AUTO: 4.9 % (ref 0.3–6.2)
ERYTHROCYTE [DISTWIDTH] IN BLOOD BY AUTOMATED COUNT: 12.3 % (ref 12.3–15.4)
HCT VFR BLD AUTO: 44 % (ref 37.5–51)
HGB BLD-MCNC: 15.2 G/DL (ref 13–17.7)
IMM GRANULOCYTES # BLD AUTO: 0.01 10*3/MM3 (ref 0–0.05)
IMM GRANULOCYTES NFR BLD AUTO: 0.2 % (ref 0–0.5)
LYMPHOCYTES # BLD AUTO: 1.3 10*3/MM3 (ref 0.7–3.1)
LYMPHOCYTES NFR BLD AUTO: 20.7 % (ref 19.6–45.3)
MCH RBC QN AUTO: 32.6 PG (ref 26.6–33)
MCHC RBC AUTO-ENTMCNC: 34.5 G/DL (ref 31.5–35.7)
MCV RBC AUTO: 94.4 FL (ref 79–97)
MONOCYTES # BLD AUTO: 0.92 10*3/MM3 (ref 0.1–0.9)
MONOCYTES NFR BLD AUTO: 14.7 % (ref 5–12)
NEUTROPHILS NFR BLD AUTO: 3.68 10*3/MM3 (ref 1.7–7)
NEUTROPHILS NFR BLD AUTO: 58.7 % (ref 42.7–76)
NRBC BLD AUTO-RTO: 0 /100 WBC (ref 0–0.2)
PLATELET # BLD AUTO: 209 10*3/MM3 (ref 140–450)
PMV BLD AUTO: 9.9 FL (ref 6–12)
RBC # BLD AUTO: 4.66 10*6/MM3 (ref 4.14–5.8)
WBC NRBC COR # BLD AUTO: 6.27 10*3/MM3 (ref 3.4–10.8)

## 2025-04-09 PROCEDURE — 85025 COMPLETE CBC W/AUTO DIFF WBC: CPT

## 2025-04-09 PROCEDURE — 80053 COMPREHEN METABOLIC PANEL: CPT

## 2025-04-09 PROCEDURE — G0103 PSA SCREENING: HCPCS

## 2025-04-09 PROCEDURE — 82306 VITAMIN D 25 HYDROXY: CPT

## 2025-04-09 PROCEDURE — 80061 LIPID PANEL: CPT

## 2025-04-09 RX ORDER — DICLOFENAC SODIUM 75 MG/1
75 TABLET, DELAYED RELEASE ORAL 2 TIMES DAILY
Qty: 30 TABLET | Refills: 6 | Status: SHIPPED | OUTPATIENT
Start: 2025-04-09

## 2025-04-09 RX ORDER — AMLODIPINE BESYLATE 10 MG/1
10 TABLET ORAL DAILY
Qty: 90 TABLET | Refills: 3 | Status: SHIPPED | OUTPATIENT
Start: 2025-04-09

## 2025-04-09 NOTE — PATIENT INSTRUCTIONS
"Hypertension, Adult  High blood pressure (hypertension) is when the force of blood pumping through the arteries is too strong. The arteries are the blood vessels that carry blood from the heart throughout the body. Hypertension forces the heart to work harder to pump blood and may cause arteries to become narrow or stiff. Untreated or uncontrolled hypertension can lead to a heart attack, heart failure, a stroke, kidney disease, and other problems.  A blood pressure reading consists of a higher number over a lower number. Ideally, your blood pressure should be below 120/80. The first (\"top\") number is called the systolic pressure. It is a measure of the pressure in your arteries as your heart beats. The second (\"bottom\") number is called the diastolic pressure. It is a measure of the pressure in your arteries as the heart relaxes.  What are the causes?  The exact cause of this condition is not known. There are some conditions that result in high blood pressure.  What increases the risk?  Certain factors may make you more likely to develop high blood pressure. Some of these risk factors are under your control, including:  Smoking.  Not getting enough exercise or physical activity.  Being overweight.  Having too much fat, sugar, calories, or salt (sodium) in your diet.  Drinking too much alcohol.  Other risk factors include:  Having a personal history of heart disease, diabetes, high cholesterol, or kidney disease.  Stress.  Having a family history of high blood pressure and high cholesterol.  Having obstructive sleep apnea.  Age. The risk increases with age.  What are the signs or symptoms?  High blood pressure may not cause symptoms. Very high blood pressure (hypertensive crisis) may cause:  Headache.  Fast or irregular heartbeats (palpitations).  Shortness of breath.  Nosebleed.  Nausea and vomiting.  Vision changes.  Severe chest pain, dizziness, and seizures.  How is this diagnosed?  This condition is diagnosed by " measuring your blood pressure while you are seated, with your arm resting on a flat surface, your legs uncrossed, and your feet flat on the floor. The cuff of the blood pressure monitor will be placed directly against the skin of your upper arm at the level of your heart. Blood pressure should be measured at least twice using the same arm. Certain conditions can cause a difference in blood pressure between your right and left arms.  If you have a high blood pressure reading during one visit or you have normal blood pressure with other risk factors, you may be asked to:  Return on a different day to have your blood pressure checked again.  Monitor your blood pressure at home for 1 week or longer.  If you are diagnosed with hypertension, you may have other blood or imaging tests to help your health care provider understand your overall risk for other conditions.  How is this treated?  This condition is treated by making healthy lifestyle changes, such as eating healthy foods, exercising more, and reducing your alcohol intake. You may be referred for counseling on a healthy diet and physical activity.  Your health care provider may prescribe medicine if lifestyle changes are not enough to get your blood pressure under control and if:  Your systolic blood pressure is above 130.  Your diastolic blood pressure is above 80.  Your personal target blood pressure may vary depending on your medical conditions, your age, and other factors.  Follow these instructions at home:  Eating and drinking    Eat a diet that is high in fiber and potassium, and low in sodium, added sugar, and fat. An example of this eating plan is called the DASH diet. DASH stands for Dietary Approaches to Stop Hypertension. To eat this way:  Eat plenty of fresh fruits and vegetables. Try to fill one half of your plate at each meal with fruits and vegetables.  Eat whole grains, such as whole-wheat pasta, brown rice, or whole-grain bread. Fill about one  fourth of your plate with whole grains.  Eat or drink low-fat dairy products, such as skim milk or low-fat yogurt.  Avoid fatty cuts of meat, processed or cured meats, and poultry with skin. Fill about one fourth of your plate with lean proteins, such as fish, chicken without skin, beans, eggs, or tofu.  Avoid pre-made and processed foods. These tend to be higher in sodium, added sugar, and fat.  Reduce your daily sodium intake. Many people with hypertension should eat less than 1,500 mg of sodium a day.  Do not drink alcohol if:  Your health care provider tells you not to drink.  You are pregnant, may be pregnant, or are planning to become pregnant.  If you drink alcohol:  Limit how much you have to:  0-1 drink a day for women.  0-2 drinks a day for men.  Know how much alcohol is in your drink. In the [place] one drink equals one 12 oz bottle of beer (355 mL), one 5 oz glass of wine (148 mL), or one 1½ oz glass of hard liquor (44 mL).  Lifestyle    Work with your health care provider to maintain a healthy body weight or to lose weight. Ask what an ideal weight is for you.  Get at least 30 minutes of exercise that causes your heart to beat faster (aerobic exercise) most days of the week. Activities may include walking, swimming, or biking.  Include exercise to strengthen your muscles (resistance exercise), such as Pilates or lifting weights, as part of your weekly exercise routine. Try to do these types of exercises for 30 minutes at least 3 days a week.  Do not use any products that contain nicotine or tobacco. These products include cigarettes, chewing tobacco, and vaping devices, such as e-cigarettes. If you need help quitting, ask your health care provider.  Monitor your blood pressure at home as told by your health care provider.  Keep all follow-up visits. This is important.  Medicines  Take over-the-counter and prescription medicines only as told by your health care provider. Follow directions carefully. Blood  pressure medicines must be taken as prescribed.  Do not skip doses of blood pressure medicine. Doing this puts you at risk for problems and can make the medicine less effective.  Ask your health care provider about side effects or reactions to medicines that you should watch for.  Contact a health care provider if you:  Think you are having a reaction to a medicine you are taking.  Have headaches that keep coming back (recurring).  Feel dizzy.  Have swelling in your ankles.  Have trouble with your vision.  Get help right away if you:  Develop a severe headache or confusion.  Have unusual weakness or numbness.  Feel faint.  Have severe pain in your chest or abdomen.  Vomit repeatedly.  Have trouble breathing.  These symptoms may be an emergency. Get help right away. Call 911.  Do not wait to see if the symptoms will go away.  Do not drive yourself to the hospital.  Summary  Hypertension is when the force of blood pumping through your arteries is too strong. If this condition is not controlled, it may put you at risk for serious complications.  Your personal target blood pressure may vary depending on your medical conditions, your age, and other factors. For most people, a normal blood pressure is less than 120/80.  Hypertension is treated with lifestyle changes, medicines, or a combination of both. Lifestyle changes include losing weight, eating a healthy, low-sodium diet, exercising more, and limiting alcohol.  This information is not intended to replace advice given to you by your health care provider. Make sure you discuss any questions you have with your health care provider.  Document Revised: 10/25/2022 Document Reviewed: 10/25/2022  Elsevier Patient Education © 2024 [employer]BMI for Adults  Body mass index (BMI) is a number found using a person's weight and height. BMI can help tell how much of a person's weight is made up of fat. BMI does not measure body fat directly. It is used instead of tests that  "directly measure body fat, which can be difficult and expensive.  What are BMI measurements used for?  BMI is useful to:  Find out if your weight puts you at higher risk for medical problems.  Help recommend changes, such as in diet and exercise. This can help you reach a healthy weight. BMI screening can be done again to see if these changes are working.  How is BMI calculated?  Your height and weight are measured. The BMI is found from those numbers. This can be done with [place] or metric measurements. Note that charts and online BMI calculators are available to help you find your BMI quickly and easily without doing these calculations.  To calculate your BMI in [place] measurements:  Measure your weight in pounds (lb).  Multiply the number of pounds by 703.  So, for an adult who weighs 150 lb, multiply that number by 703: 150 x 703, which equals 105,450.  Measure your height in inches. Then multiply that number by itself to get a measurement called \"inches squared.\"  So, for an adult who is 70 inches tall, the \"inches squared\" measurement is 70 inches x 70 inches, which equals 4,900 inches squared.  Divide the total from step 2 (number of lb x 703) by the total from step 3 (inches squared): 105,450 ÷ 4,900 = 21.5. This is your BMI.  To calculate your BMI in metric measurements:    Measure your weight in kilograms (kg).  For this example, the weight is 70 kg.  Measure your height in meters (m). Then multiply that number by itself to get a measurement called \"meters squared.\"  So, for an adult who is 1.75 m tall, the \"meters squared\" measurement is 1.75 m x 1.75 m, which equals 3.1 meters squared.  Divide the number of kilograms (your weight) by the meters squared number. In this example: 70 ÷ 3.1 = 22.6. This is your BMI.  What do the results mean?  BMI charts are used to see if you are underweight, normal weight, overweight, or obese. The following guidelines will be used:  Underweight: BMI less than 18.5.  Normal " weight: BMI between 18.5 and 24.9.  Overweight: BMI between 25 and 29.9.  Obese: BMI of 30 or above.  BMI is a tool and cannot diagnose a condition. Talk with your health care provider about what your BMI means for you. Keep these notes in mind:  Weight includes fat and muscle. Someone with a muscular build, such as an athlete, may have a BMI that is higher than 24.9. In cases like these, BMI is not a correct measure of body fat.  If you have a BMI of 25 or higher, your provider may need to do more testing to find out if excess body fat is the cause.  BMI is measured the same way for males and females. Females usually have more body fat than males of the same height and weight.  Where to find more information  For more information about BMI, including tools to quickly find your BMI, go to:  Centers for Disease Control and Prevention: cdc.gov  American Heart Association: heart.org  National Heart, Lung, and Blood [place]: nhlbi.nih.gov  This information is not intended to replace advice given to you by your health care provider. Make sure you discuss any questions you have with your health care provider.  Document Revised: 2023 Document Reviewed: 2023  [employer] Patient Education ©  [employer]vier Inc[employer]  Medicare Wellness  Personal Prevention Plan of Service     Date of Office Visit:    Encounter Provider:  Tani Vazquez MD  Place of Service:  [facility]  Patient Name: Kt Davis  :  1955    As part of the Medicare Wellness portion of your visit today, we are providing you with this personalized preventive plan of services (PPPS). This plan is based upon recommendations of the [place] Preventive Services Task Force (USPSTF) and the Advisory Committee on Immunization Practices (ACIP).    This lists the preventive care services that should be considered, and provides dates of when you are due. Items listed as completed are up-to-date and do not require  any further intervention.    Health Maintenance   Topic Date Due    LIPID PANEL  04/04/2025    COVID-19 Vaccine (7 - 2024-25 season) 04/23/2025 (Originally 4/8/2025)    INFLUENZA VACCINE  07/01/2025    COLORECTAL CANCER SCREENING  12/02/2025    ANNUAL WELLNESS VISIT  04/09/2026    TDAP/TD VACCINES (6 - Td or Tdap) 07/03/2033    HEPATITIS C SCREENING  Completed    Pneumococcal Vaccine 50+  Completed    ZOSTER VACCINE  Completed       Orders Placed This Encounter   Procedures    Comprehensive Metabolic Panel     Standing Status:   Future     Expected Date:   7/8/2025     Expiration Date:   4/9/2026     Release to patient:   Routine Release [[id number]]    Lipid Panel     Standing Status:   Future     Expected Date:   7/8/2025     Expiration Date:   4/9/2026     Release to patient:   Routine Release [[id number]]    PSA Screen     Standing Status:   Future     Expected Date:   7/8/2025     Expiration Date:   4/9/2026     Release to patient:   Routine Release [[id number]]    Vitamin D,25-Hydroxy     Standing Status:   Future     Expected Date:   7/8/2025     Expiration Date:   4/9/2026     Release to patient:   Routine Release [[id number]]    CBC & Differential     Standing Status:   Future     Expected Date:   7/8/2025     Expiration Date:   4/9/2026     Manual Differential:   No     Release to patient:   Routine Release [[id number]]       Return in about 3 months (around 7/9/2025) for Follow-up HTN.

## 2025-04-09 NOTE — ASSESSMENT & PLAN NOTE
Hypertension is uncontrolled  Medication changes per orders.  Blood pressure will be reassessedin 3 months.    Orders:    Comprehensive Metabolic Panel; Future    CBC & Differential; Future    Lipid Panel; Future    amLODIPine (NORVASC) 10 MG tablet; Take 1 tablet by mouth Daily.

## 2025-04-09 NOTE — ASSESSMENT & PLAN NOTE
Orders:    diclofenac (VOLTAREN) 75 MG EC tablet; Take 1 tablet by mouth 2 (Two) Times a Day. As needed

## 2025-04-09 NOTE — PROGRESS NOTES
Subjective   The ABCs of the Annual Wellness Visit  Medicare Wellness Visit      Kt Davis is a 70 y.o. patient who presents for a Medicare Wellness Visit.    The following portions of the patient's history were reviewed and   updated as appropriate: allergies, current medications, past family history, past medical history, past social history, past surgical history, and problem list.    Compared to one year ago, the patient's physical   health is the same.  Compared to one year ago, the patient's mental   health is the same.    Recent Hospitalizations:  He was not admitted to the hospital during the last year.     Current Medical Providers:  Patient Care Team:  Tani Vazquez MD as PCP - General (Family Medicine)  Davin Swenson MD as Consulting Physician (Orthopedic Surgery)  Roxy Lewis PA-C as Physician Assistant (Gastroenterology)    Outpatient Medications Prior to Visit   Medication Sig Dispense Refill    multivitamin with minerals tablet tablet Take 1 tablet by mouth Daily.      omeprazole (priLOSEC) 40 MG capsule Take 1 capsule by mouth Daily. 90 capsule 3    amLODIPine (NORVASC) 5 MG tablet TAKE 1 TABLET BY MOUTH DAILY 90 tablet 3    diclofenac (VOLTAREN) 75 MG EC tablet Take 1 tablet by mouth 2 (Two) Times a Day. (Patient taking differently: Take 1 tablet by mouth 2 (Two) Times a Day. As needed) 180 tablet 1     No facility-administered medications prior to visit.     No opioid medication identified on active medication list. I have reviewed chart for other potential  high risk medication/s and harmful drug interactions in the elderly.      Aspirin is not on active medication list.  Aspirin use is not indicated based on review of current medical condition/s. Risk of harm outweighs potential benefits.  .    Patient Active Problem List   Diagnosis    Essential hypertension    Medicare annual wellness visit, subsequent    Encounter for screening for malignant neoplasm of prostate      "Chronic pain of both knees    Quadriceps weakness    Nevus    Status post total replacement of right hip    Status post total hip replacement, left    Vitamin D deficiency    Mixed hyperlipidemia    Colon cancer screening     Advance Care Planning Advance Directive is on file.  ACP discussion was held with the patient during this visit. Patient has an advance directive in EMR which is still valid.             Objective   Vitals:    25 1305   BP: 144/84   BP Location: Left arm   Patient Position: Sitting   Cuff Size: Adult   Pulse: 80   Temp: 98.2 °F (36.8 °C)   TempSrc: Infrared   SpO2: 98%   Weight: 88.6 kg (195 lb 6.4 oz)   Height: 175.3 cm (69\")   PainSc: 0-No pain       Estimated body mass index is 28.86 kg/m² as calculated from the following:    Height as of this encounter: 175.3 cm (69\").    Weight as of this encounter: 88.6 kg (195 lb 6.4 oz).    BMI is >= 25 and <30. (Overweight) The following options were offered after discussion;: weight loss educational material (shared in after visit summary), exercise counseling/recommendations, and nutrition counseling/recommendations           Does the patient have evidence of cognitive impairment? No                                                                                               Health  Risk Assessment    Smoking Status:  Social History     Tobacco Use   Smoking Status Never    Passive exposure: Never   Smokeless Tobacco Never     Alcohol Consumption:  Social History     Substance and Sexual Activity   Alcohol Use No       Fall Risk Screen  STEADI Fall Risk Assessment was completed, and patient is at LOW risk for falls.Assessment completed on:2025    Depression Screening   Little interest or pleasure in doing things? Not at all   Feeling down, depressed, or hopeless? Not at all   PHQ-2 Total Score 0      Health Habits and Functional and Cognitive Screenin/2/2025     4:46 PM   Functional & Cognitive Status   Do you have difficulty " preparing food and eating? No   Do you have difficulty bathing yourself, getting dressed or grooming yourself? No   Do you have difficulty using the toilet? No   Do you have difficulty moving around from place to place? No   Do you have trouble with steps or getting out of a bed or a chair? No   Current Diet Well Balanced Diet   Dental Exam Up to date   Eye Exam Up to date   Exercise (times per week) 2 times per week   Current Exercises Include Other;Walking;Yard Work   Do you need help using the phone?  No   Are you deaf or do you have serious difficulty hearing?  No   Do you need help to go to places out of walking distance? No   Do you need help shopping? No   Do you need help preparing meals?  No   Do you need help with housework?  No   Do you need help with laundry? No   Do you need help taking your medications? No   Do you need help managing money? No   Do you ever drive or ride in a car without wearing a seat belt? No   Have you felt unusual stress, anger or loneliness in the last month? No   Who do you live with? Spouse   If you need help, do you have trouble finding someone available to you? No   Have you been bothered in the last four weeks by sexual problems? No   Do you have difficulty concentrating, remembering or making decisions? No           Age-appropriate Screening Schedule:  Refer to the list below for future screening recommendations based on patient's age, sex and/or medical conditions. Orders for these recommended tests are listed in the plan section. The patient has been provided with a written plan.    Health Maintenance List  Health Maintenance   Topic Date Due    LIPID PANEL  04/04/2025    COVID-19 Vaccine (7 - 2024-25 season) 04/23/2025 (Originally 4/8/2025)    INFLUENZA VACCINE  07/01/2025    COLORECTAL CANCER SCREENING  12/02/2025    ANNUAL WELLNESS VISIT  04/09/2026    TDAP/TD VACCINES (6 - Td or Tdap) 07/03/2033    HEPATITIS C SCREENING  Completed    Pneumococcal Vaccine 50+  Completed     ZOSTER VACCINE  Completed                                                                                                                                                CMS Preventative Services Quick Reference  Risk Factors Identified During Encounter  None Identified    The above risks/problems have been discussed with the patient.  Pertinent information has been shared with the patient in the After Visit Summary.  An After Visit Summary and PPPS were made available to the patient.    Follow Up:   Next Medicare Wellness visit to be scheduled in 1 year.     Assessment & Plan  Medicare annual wellness visit, subsequent  The patient is here for health maintenance visit.  Currently, the patient consumes a healthy diet and has an adequate exercise regimen.  Screening lab work is ordered.  Immunizations were reviewed today.  Advice and education was given regarding nutrition, aerobic exercise, routine dental evaluations, routine eye exams, reproductive health, cardiovascular risk reduction, sunscreen use, self skin examination (annual dermatology evaluations) and seatbelt use (general overall safety).  Further recommendations will be given if needed after lab evaluation.  Annual wellness evaluation is recommended.    Orders:    Comprehensive Metabolic Panel; Future    CBC & Differential; Future    Lipid Panel; Future    PSA Screen; Future    Vitamin D,25-Hydroxy; Future    Mixed hyperlipidemia       Orders:    Comprehensive Metabolic Panel; Future    Lipid Panel; Future    Essential hypertension  Hypertension is uncontrolled  Medication changes per orders.  Blood pressure will be reassessedin 3 months.    Orders:    Comprehensive Metabolic Panel; Future    CBC & Differential; Future    Lipid Panel; Future    amLODIPine (NORVASC) 10 MG tablet; Take 1 tablet by mouth Daily.    Vitamin D deficiency    Orders:    Vitamin D,25-Hydroxy; Future    Encounter for screening for malignant neoplasm of prostate     Orders:    PSA  Screen; Future    Chronic pain of both knees    Orders:    diclofenac (VOLTAREN) 75 MG EC tablet; Take 1 tablet by mouth 2 (Two) Times a Day. As needed         Follow Up:   Return in about 3 months (around 7/9/2025) for Follow-up HTN.

## 2025-04-09 NOTE — ASSESSMENT & PLAN NOTE
Orders:    PSA Screen; Future     Refill request received for   rosuvastatin (CRESTOR) 20 MG tablet  Last office visit: 9/9/2022   Next office visit: 3/13/2023   Last refill: previous provider   Last Labs: 9/6/2022

## 2025-04-09 NOTE — ASSESSMENT & PLAN NOTE
The patient is here for health maintenance visit.  Currently, the patient consumes a healthy diet and has an adequate exercise regimen.  Screening lab work is ordered.  Immunizations were reviewed today.  Advice and education was given regarding nutrition, aerobic exercise, routine dental evaluations, routine eye exams, reproductive health, cardiovascular risk reduction, sunscreen use, self skin examination (annual dermatology evaluations) and seatbelt use (general overall safety).  Further recommendations will be given if needed after lab evaluation.  Annual wellness evaluation is recommended.    Orders:    Comprehensive Metabolic Panel; Future    CBC & Differential; Future    Lipid Panel; Future    PSA Screen; Future    Vitamin D,25-Hydroxy; Future

## 2025-04-10 LAB
25(OH)D3 SERPL-MCNC: 35 NG/ML (ref 30–100)
ALBUMIN SERPL-MCNC: 4.1 G/DL (ref 3.5–5.2)
ALBUMIN/GLOB SERPL: 1.4 G/DL
ALP SERPL-CCNC: 83 U/L (ref 39–117)
ALT SERPL W P-5'-P-CCNC: 27 U/L (ref 1–41)
ANION GAP SERPL CALCULATED.3IONS-SCNC: 8.7 MMOL/L (ref 5–15)
AST SERPL-CCNC: 36 U/L (ref 1–40)
BILIRUB SERPL-MCNC: 0.4 MG/DL (ref 0–1.2)
BUN SERPL-MCNC: 14 MG/DL (ref 8–23)
BUN/CREAT SERPL: 12.3 (ref 7–25)
CALCIUM SPEC-SCNC: 9.3 MG/DL (ref 8.6–10.5)
CHLORIDE SERPL-SCNC: 104 MMOL/L (ref 98–107)
CHOLEST SERPL-MCNC: 155 MG/DL (ref 0–200)
CO2 SERPL-SCNC: 23.3 MMOL/L (ref 22–29)
CREAT SERPL-MCNC: 1.14 MG/DL (ref 0.76–1.27)
EGFRCR SERPLBLD CKD-EPI 2021: 68.8 ML/MIN/1.73
GLOBULIN UR ELPH-MCNC: 2.9 GM/DL
GLUCOSE SERPL-MCNC: 107 MG/DL (ref 65–99)
HDLC SERPL-MCNC: 35 MG/DL (ref 40–60)
LDLC SERPL CALC-MCNC: 99 MG/DL (ref 0–100)
LDLC/HDLC SERPL: 2.77 {RATIO}
POTASSIUM SERPL-SCNC: 4.3 MMOL/L (ref 3.5–5.2)
PROT SERPL-MCNC: 7 G/DL (ref 6–8.5)
PSA SERPL-MCNC: 0.4 NG/ML (ref 0–4)
SODIUM SERPL-SCNC: 136 MMOL/L (ref 136–145)
TRIGL SERPL-MCNC: 116 MG/DL (ref 0–150)
VLDLC SERPL-MCNC: 21 MG/DL (ref 5–40)

## 2025-06-06 ENCOUNTER — HOSPITAL ENCOUNTER (OUTPATIENT)
Dept: GENERAL RADIOLOGY | Facility: HOSPITAL | Age: 70
Discharge: HOME | End: 2025-06-06
Payer: MEDICARE

## 2025-06-06 DIAGNOSIS — R05.1 ACUTE COUGH: ICD-10-CM

## 2025-06-06 DIAGNOSIS — R06.00 DYSPNEA, UNSPECIFIED TYPE: ICD-10-CM

## 2025-06-06 DIAGNOSIS — R06.2 WHEEZING: ICD-10-CM

## 2025-06-06 PROCEDURE — 71046 X-RAY EXAM CHEST 2 VIEWS: CPT

## 2025-06-10 ENCOUNTER — PATIENT ROUNDING (BHMG ONLY) (OUTPATIENT)
Dept: URGENT CARE | Facility: CLINIC | Age: 70
End: 2025-06-10
Payer: MEDICARE

## 2025-06-10 NOTE — ED NOTES
Thank you for letting us care for you in your recent visit to our urgent care center. We would love to hear about your experience with us. Was this the first time you have visited our location?    We’re always looking for ways to make our patients’ experiences even better. Do you have any recommendations on ways we may improve?     I appreciate you taking the time to respond. Please be on the lookout for a survey about your recent visit from [facility] via text or email. We would greatly appreciate if you could fill that out and turn it back in. We want your voice to be heard and we value your feedback.   Thank you for choosing [facility] for your healthcare needs.

## 2025-06-16 ENCOUNTER — OFFICE VISIT (OUTPATIENT)
Dept: FAMILY MEDICINE CLINIC | Facility: CLINIC | Age: 70
End: 2025-06-16
Payer: MEDICARE

## 2025-06-16 VITALS
SYSTOLIC BLOOD PRESSURE: 144 MMHG | OXYGEN SATURATION: 99 % | DIASTOLIC BLOOD PRESSURE: 80 MMHG | TEMPERATURE: 98.6 F | HEART RATE: 93 BPM | WEIGHT: 197.2 LBS | BODY MASS INDEX: 29.12 KG/M2

## 2025-06-16 DIAGNOSIS — R05.2 SUBACUTE COUGH: ICD-10-CM

## 2025-06-16 DIAGNOSIS — R91.8 OPACITY OF LUNG ON IMAGING STUDY: ICD-10-CM

## 2025-06-16 DIAGNOSIS — J18.9 PNEUMONIA DUE TO INFECTIOUS ORGANISM, UNSPECIFIED LATERALITY, UNSPECIFIED PART OF LUNG: Primary | ICD-10-CM

## 2025-06-16 DIAGNOSIS — J98.8 CONGESTION OF UPPER AIRWAY: ICD-10-CM

## 2025-06-16 PROCEDURE — 1160F RVW MEDS BY RX/DR IN RCRD: CPT | Performed by: FAMILY MEDICINE

## 2025-06-16 PROCEDURE — 3077F SYST BP >= 140 MM HG: CPT | Performed by: FAMILY MEDICINE

## 2025-06-16 PROCEDURE — 1126F AMNT PAIN NOTED NONE PRSNT: CPT | Performed by: FAMILY MEDICINE

## 2025-06-16 PROCEDURE — G2211 COMPLEX E/M VISIT ADD ON: HCPCS | Performed by: FAMILY MEDICINE

## 2025-06-16 PROCEDURE — 1159F MED LIST DOCD IN RCRD: CPT | Performed by: FAMILY MEDICINE

## 2025-06-16 PROCEDURE — 3079F DIAST BP 80-89 MM HG: CPT | Performed by: FAMILY MEDICINE

## 2025-06-16 PROCEDURE — 99214 OFFICE O/P EST MOD 30 MIN: CPT | Performed by: FAMILY MEDICINE

## 2025-06-16 RX ORDER — GUAIFENESIN AND DEXTROMETHORPHAN HYDROBROMIDE 1200; 60 MG/1; MG/1
TABLET, EXTENDED RELEASE ORAL
COMMUNITY

## 2025-06-16 RX ORDER — AZITHROMYCIN 250 MG/1
TABLET, FILM COATED ORAL
Qty: 6 TABLET | Refills: 0 | Status: SHIPPED | OUTPATIENT
Start: 2025-06-16

## 2025-06-16 NOTE — ASSESSMENT & PLAN NOTE
Cough has been chronic and without upper airway congestion I suspect that there is a component of allergy at play.  Patient will begin over-the-counter antihistamine such as Claritin, Allegra, Zyrtec, or Xyzal in addition to an intranasal steroid such as Flonase.  I suspect that he has been undertreated for pneumonia as he has received Augmentin at 1 visit to urgent care and doxycycline at the other visit urgent care but has not had a combination of antibiotics to treat the pneumonia.  Will treat with Augmentin and azithromycin.  CT chest with contrast ordered to further evaluate chronic left lower lung opacity.  RTC/ED precautions given.

## 2025-06-16 NOTE — PROGRESS NOTES
Kt Davis is a 70 y.o. male who presents today for Pneumonia, Cough, and URI      HPI     Patient was seen in urgent care on 6/6/2025 with fatigue, runny nose, cough for 5 days.  He has had intermittent wheezing and shortness of breath for 2 to 3 days.  He was treated for pneumonia back in December 2024.  He had no known fevers.  X-ray showed peripheral opacities with persistent increased opacity in the retrocardiac significant aspect of the left base.  Is recommended to do CT chest with contrast given these findings have been persistent have worsened.  Patient was treated for pneumonia given doxycycline for 7 days and a 5-day steroid burst.  He was also given albuterol inhaler to use as needed advised to use Mucinex for symptomatic relief.    He finished the steroid and antibiotic. He still has coughing fits and feels they are improving slightly and he is not waking up at night coughing as much.  He also still has some wheezing. He has continued the mucinex-DM. He has SOA with coughing spells and is a little more short of breath than normal when going up stairs. He uses the albuterol inhaler and does find this helps some.     Review of Systems   Constitutional:  Negative for chills, fever and unexpected weight loss.   HENT:  Positive for congestion, postnasal drip and rhinorrhea. Negative for ear pain, sinus pressure and sore throat.    Eyes:  Negative for visual disturbance.   Respiratory:  Positive for cough, shortness of breath and wheezing.    Cardiovascular:  Negative for chest pain and palpitations.   Gastrointestinal:  Negative for abdominal pain, blood in stool, constipation, diarrhea, nausea, vomiting and GERD.   Endocrine: Negative for polydipsia and polyuria.   Genitourinary:  Negative for difficulty urinating.   Musculoskeletal:  Negative for joint swelling and myalgias.   Skin:  Negative for rash and skin lesions.   Allergic/Immunologic: Negative for environmental allergies.   Neurological:   Negative for seizures and syncope.   Hematological:  Does not bruise/bleed easily.   Psychiatric/Behavioral:  Negative for suicidal ideas.         The following portions of the patient's history were reviewed and updated as appropriate: allergies, current medications, past family history, past medical history, past social history, past surgical history and problem list.    Current Outpatient Medications on File Prior to Visit   Medication Sig Dispense Refill    albuterol sulfate  (90 Base) MCG/ACT inhaler Inhale 2 puffs Every 4 (Four) Hours As Needed for Shortness of Air or Wheezing. 18 g 0    amLODIPine (NORVASC) 10 MG tablet Take 1 tablet by mouth Daily. 90 tablet 3    Dextromethorphan-guaiFENesin (Mucinex DM Maximum Strength)  MG tablet sustained-release 12 hour Take  by mouth.      diclofenac (VOLTAREN) 75 MG EC tablet Take 1 tablet by mouth 2 (Two) Times a Day. As needed 30 tablet 6    omeprazole (priLOSEC) 40 MG capsule Take 1 capsule by mouth Daily. 90 capsule 3     No current facility-administered medications on file prior to visit.       Allergies   Allergen Reactions    Lisinopril Cough    Valsartan Cough        Visit Vitals  /80 (BP Location: Left arm, Patient Position: Sitting, Cuff Size: Adult)   Pulse 93   Temp 98.6 °F (37 °C) (Infrared)   Wt 89.4 kg (197 lb 3.2 oz)   SpO2 99%   BMI 29.12 kg/m²        Physical Exam  Constitutional:       General: He is not in acute distress.     Appearance: He is well-developed. He is not diaphoretic.   HENT:      Head: Atraumatic.   Cardiovascular:      Rate and Rhythm: Normal rate and regular rhythm.      Heart sounds: Normal heart sounds. No murmur heard.     No friction rub. No gallop.   Pulmonary:      Effort: Pulmonary effort is normal. No respiratory distress.      Breath sounds: No stridor. Rhonchi (left lower lung field) present. No wheezing or rales.   Musculoskeletal:      Cervical back: Normal range of motion and neck supple.   Skin:      General: Skin is warm and dry.   Neurological:      Mental Status: He is alert and oriented to person, place, and time.   Psychiatric:         Behavior: Behavior normal.          Results for orders placed or performed during the hospital encounter of 06/06/25   Covid-19 + Flu A&B AG, Veritor    Collection Time: 06/06/25 10:36 AM    Specimen: Swab   Result Value Ref Range    COVID19 Not Detected     Influenza A Antigen KERI Not Detected     Influenza B Antigen KERI Not Detected     Internal Control Passed     Lot Number [id number]     Expiration Date 1/23/26         Problems Addressed this Visit          Pulmonary and Pneumonias    Pneumonia - Primary    Cough has been chronic and without upper airway congestion I suspect that there is a component of allergy at play.  Patient will begin over-the-counter antihistamine such as Claritin, Allegra, Zyrtec, or Xyzal in addition to an intranasal steroid such as Flonase.  I suspect that he has been undertreated for pneumonia as he has received Augmentin at 1 visit to urgent care and doxycycline at the other visit urgent care but has not had a combination of antibiotics to treat the pneumonia.  Will treat with Augmentin and azithromycin.  CT chest with contrast ordered to further evaluate chronic left lower lung opacity.  RTC/ED precautions given.         Relevant Medications    Dextromethorphan-guaiFENesin (Mucinex DM Maximum Strength)  MG tablet sustained-release 12 hour    amoxicillin-clavulanate (AUGMENTIN) 875-125 MG per tablet    azithromycin (ZITHROMAX) 250 MG tablet    Subacute cough    Relevant Orders    CT Chest With Contrast Diagnostic    Congestion of upper airway    Opacity of lung on imaging study    Relevant Orders    CT Chest With Contrast Diagnostic     Diagnoses         Codes Comments      Pneumonia due to infectious organism, unspecified laterality, unspecified part of lung    -  Primary ICD-10-CM: J18.9  ICD-9-CM: 486       Subacute cough      ICD-10-CM: R05.2  ICD-9-CM: 786.2       Congestion of upper airway     ICD-10-CM: J98.8  ICD-9-CM: 519.8       Opacity of lung on imaging study     ICD-10-CM: R91.8  ICD-9-CM: 793.19             Return in about 4 weeks (around 7/14/2025) for Follow-up pneumonia and HTN.    Tani Vazquez MD   6/16/2025

## 2025-07-03 ENCOUNTER — HOSPITAL ENCOUNTER (OUTPATIENT)
Dept: CT IMAGING | Facility: HOSPITAL | Age: 70
Discharge: HOME | End: 2025-07-03
Admitting: FAMILY MEDICINE
Payer: MEDICARE

## 2025-07-03 DIAGNOSIS — R05.2 SUBACUTE COUGH: ICD-10-CM

## 2025-07-03 DIAGNOSIS — R91.8 OPACITY OF LUNG ON IMAGING STUDY: ICD-10-CM

## 2025-07-03 PROCEDURE — 71260 CT THORAX DX C+: CPT

## 2025-07-03 PROCEDURE — 25510000001 IOPAMIDOL 61 % SOLUTION: Performed by: FAMILY MEDICINE

## 2025-07-03 RX ORDER — IOPAMIDOL 612 MG/ML
85 INJECTION, SOLUTION INTRAVASCULAR
Status: COMPLETED | OUTPATIENT
Start: 2025-07-03 | End: 2025-07-03

## 2025-07-03 RX ADMIN — IOPAMIDOL 85 ML: 612 INJECTION, SOLUTION INTRAVENOUS at 15:47

## 2025-07-09 ENCOUNTER — OFFICE VISIT (OUTPATIENT)
Dept: FAMILY MEDICINE CLINIC | Facility: CLINIC | Age: 70
End: 2025-07-09
Payer: MEDICARE

## 2025-07-09 VITALS
HEIGHT: 69 IN | WEIGHT: 197.2 LBS | BODY MASS INDEX: 29.21 KG/M2 | SYSTOLIC BLOOD PRESSURE: 134 MMHG | DIASTOLIC BLOOD PRESSURE: 76 MMHG | HEART RATE: 78 BPM | OXYGEN SATURATION: 97 % | TEMPERATURE: 98.9 F

## 2025-07-09 DIAGNOSIS — I10 ESSENTIAL HYPERTENSION: Primary | ICD-10-CM

## 2025-07-09 DIAGNOSIS — J30.89 ENVIRONMENTAL AND SEASONAL ALLERGIES: ICD-10-CM

## 2025-07-09 PROBLEM — R91.8 OPACITY OF LUNG ON IMAGING STUDY: Status: RESOLVED | Noted: 2025-06-16 | Resolved: 2025-07-09

## 2025-07-09 PROBLEM — R05.2 SUBACUTE COUGH: Status: RESOLVED | Noted: 2025-06-16 | Resolved: 2025-07-09

## 2025-07-09 PROCEDURE — 1160F RVW MEDS BY RX/DR IN RCRD: CPT | Performed by: FAMILY MEDICINE

## 2025-07-09 PROCEDURE — 3078F DIAST BP <80 MM HG: CPT | Performed by: FAMILY MEDICINE

## 2025-07-09 PROCEDURE — 1159F MED LIST DOCD IN RCRD: CPT | Performed by: FAMILY MEDICINE

## 2025-07-09 PROCEDURE — 99214 OFFICE O/P EST MOD 30 MIN: CPT | Performed by: FAMILY MEDICINE

## 2025-07-09 PROCEDURE — G2211 COMPLEX E/M VISIT ADD ON: HCPCS | Performed by: FAMILY MEDICINE

## 2025-07-09 PROCEDURE — 3075F SYST BP GE 130 - 139MM HG: CPT | Performed by: FAMILY MEDICINE

## 2025-07-09 PROCEDURE — 1126F AMNT PAIN NOTED NONE PRSNT: CPT | Performed by: FAMILY MEDICINE

## 2025-07-09 RX ORDER — FLUTICASONE PROPIONATE 50 MCG
2 SPRAY, SUSPENSION (ML) NASAL DAILY
Qty: 16 G | Refills: 3 | Status: SHIPPED | OUTPATIENT
Start: 2025-07-09

## 2025-07-09 NOTE — ASSESSMENT & PLAN NOTE
Lungs were clear on exam today.  I suspect most of the symptoms he is having now are due to uncontrolled environmental and seasonal allergies.  Patient will use the Mucinex as needed for congestion.  He will start one of the over-the-counter antihistamine such as Claritin, Allegra, Zyrtec, or Xyzal.  He was given an order for Flonase to help with postnasal drip.  RTC/ED precautions given.

## 2025-07-09 NOTE — PROGRESS NOTES
Kt Davis is a 70 y.o. male who presents today for Pneumonia (F/U from June ), Results (CT scan last week ), and Hypertension      HPI     Patient completed the course of augmentin and azithromycin and feel symptoms have significantly improved. He still has mild occasional cough but feels its from allergies. Patient has been taking amlodipine 10mg daily. He has tolerated this well. He sees numbers in the 130/70-80s at home.     Review of Systems   Constitutional:  Negative for chills, fever and unexpected weight loss.   HENT:  Positive for postnasal drip. Negative for congestion, ear pain, rhinorrhea and sore throat.    Eyes:  Negative for visual disturbance.   Respiratory:  Positive for cough (improving). Negative for shortness of breath and wheezing.    Cardiovascular:  Negative for chest pain and palpitations.   Gastrointestinal:  Negative for abdominal pain, blood in stool, constipation, diarrhea, nausea, vomiting and GERD.   Endocrine: Negative for polydipsia and polyuria.   Genitourinary:  Negative for difficulty urinating.   Musculoskeletal:  Negative for joint swelling and myalgias.   Skin:  Negative for rash and skin lesions.   Allergic/Immunologic: Negative for environmental allergies.   Neurological:  Negative for seizures and syncope.   Hematological:  Does not bruise/bleed easily.   Psychiatric/Behavioral:  Negative for suicidal ideas.         The following portions of the patient's history were reviewed and updated as appropriate: allergies, current medications, past family history, past medical history, past social history, past surgical history and problem list.    Current Outpatient Medications on File Prior to Visit   Medication Sig Dispense Refill    albuterol sulfate  (90 Base) MCG/ACT inhaler Inhale 2 puffs Every 4 (Four) Hours As Needed for Shortness of Air or Wheezing. 18 g 0    amLODIPine (NORVASC) 10 MG tablet Take 1 tablet by mouth Daily. 90 tablet 3     "Dextromethorphan-guaiFENesin (Mucinex DM Maximum Strength)  MG tablet sustained-release 12 hour Take  by mouth.      diclofenac (VOLTAREN) 75 MG EC tablet Take 1 tablet by mouth 2 (Two) Times a Day. As needed 30 tablet 6    omeprazole (priLOSEC) 40 MG capsule Take 1 capsule by mouth Daily. 90 capsule 3    [DISCONTINUED] amoxicillin-clavulanate (AUGMENTIN) 875-125 MG per tablet Take 1 tablet by mouth 2 (Two) Times a Day. 14 tablet 0    [DISCONTINUED] azithromycin (ZITHROMAX) 250 MG tablet Take 2 tablets the first day, then 1 tablet daily for 4 days. 6 tablet 0     No current facility-administered medications on file prior to visit.       Allergies   Allergen Reactions    Lisinopril Cough    Valsartan Cough        Visit Vitals  /76 (BP Location: Left arm, Patient Position: Sitting)   Pulse 78   Temp 98.9 °F (37.2 °C) (Infrared)   Ht 175.3 cm (69\")   Wt 89.4 kg (197 lb 3.2 oz)   SpO2 97%   BMI 29.12 kg/m²        Physical Exam  Constitutional:       General: He is not in acute distress.     Appearance: He is well-developed. He is not diaphoretic.   HENT:      Head: Atraumatic.   Cardiovascular:      Rate and Rhythm: Normal rate and regular rhythm.      Heart sounds: Normal heart sounds. No murmur heard.     No friction rub. No gallop.   Pulmonary:      Effort: Pulmonary effort is normal. No respiratory distress.      Breath sounds: Normal breath sounds. No stridor. No wheezing, rhonchi or rales.   Musculoskeletal:      Cervical back: Normal range of motion and neck supple.   Skin:     General: Skin is warm and dry.   Neurological:      Mental Status: He is alert and oriented to person, place, and time.   Psychiatric:         Behavior: Behavior normal.          Results for orders placed or performed during the hospital encounter of 06/06/25   Covid-19 + Flu A&B AG, Veritor    Collection Time: 06/06/25 10:36 AM    Specimen: Swab   Result Value Ref Range    COVID19 Not Detected     Influenza A Antigen KERI Not " Detected     Influenza B Antigen KERI Not Detected     Internal Control Passed     Lot Number [id number]     Expiration Date 1/23/26         Problems Addressed this Visit          Allergies and Adverse Reactions    Environmental and seasonal allergies    Lungs were clear on exam today.  I suspect most of the symptoms he is having now are due to uncontrolled environmental and seasonal allergies.  Patient will use the Mucinex as needed for congestion.  He will start one of the over-the-counter antihistamine such as Claritin, Allegra, Zyrtec, or Xyzal.  He was given an order for Flonase to help with postnasal drip.  RTC/ED precautions given.         Relevant Medications    fluticasone (FLONASE) 50 MCG/ACT nasal spray       Cardiac and Vasculature    Essential hypertension - Primary    Hypertension is stable and controlled  Continue current treatment regimen.  Blood pressure will be reassessed 5 months.          Diagnoses         Codes Comments      Essential hypertension    -  Primary ICD-10-CM: I10  ICD-9-CM: 401.9       Environmental and seasonal allergies     ICD-10-CM: J30.89  ICD-9-CM: 477.8             Return in about 5 months (around 12/1/2025) for Follow-up HTN.    Tani Vazquez MD   7/9/2025

## 2025-07-09 NOTE — ASSESSMENT & PLAN NOTE
Hypertension is stable and controlled  Continue current treatment regimen.  Blood pressure will be reassessed 5 months.

## (undated) DEVICE — GLV SURG SENSICARE PI ORTHO SZ8 LF STRL

## (undated) DEVICE — PILLW ABD MD

## (undated) DEVICE — ELECTRD BLD EZ CLN STD 6.5IN

## (undated) DEVICE — SUT VIC 1 CTX 36IN OBGYN VCP977H

## (undated) DEVICE — TBG PENCL TELESCP MEGADYNE SMOKE EVAC 10FT

## (undated) DEVICE — GLV SURG PREMIERPRO MIC LTX PF SZ7.5 BRN

## (undated) DEVICE — BLANKT WARM UPPR/BDY ARM/OUT 57X196CM

## (undated) DEVICE — NEEDLE, QUINCKE 22GX3.5": Brand: MEDLINE INDUSTRIES, INC.

## (undated) DEVICE — TB SXN FRAZIER 12F STRL

## (undated) DEVICE — SPNG LAP PREWSH SFTPK 18X18IN STRL PK/5

## (undated) DEVICE — STERILE PVP: Brand: MEDLINE INDUSTRIES, INC.

## (undated) DEVICE — TRY EPID SFTY 18G 3.5IN 1T7680

## (undated) DEVICE — PEG PAD FOR SURG DISP

## (undated) DEVICE — UNDERGLV SURG BIOGEL INDICAT PI SZ8 BLU

## (undated) DEVICE — GLV SURG PREMIERPRO MIC LTX PF SZ8 BRN

## (undated) DEVICE — BIT DRL 3.3X25MM DISP

## (undated) DEVICE — APPL CHLORAPREP TINTED 26ML TEAL

## (undated) DEVICE — ADHS LIQ MASTISOL 2/3ML

## (undated) DEVICE — MONOFILAMENT POLYBUTESTER: Brand: NOVAFIL

## (undated) DEVICE — HEWSON SUTURE RETRIEVER: Brand: HEWSON SUTURE RETRIEVER

## (undated) DEVICE — SYR LUERLOK 30CC

## (undated) DEVICE — PK HIP TOTL UNIV 10

## (undated) DEVICE — ADHS SKIN PREMIERPRO EXOFIN TOPICAL HI/VISC .5ML

## (undated) DEVICE — PK MINOR SPLT 10

## (undated) DEVICE — BNDG ELAS CO-FLEX SLF ADHR 6IN 5YD LF STRL

## (undated) DEVICE — GLV SURG SENSICARE PI MIC PF SZ9 LF STRL

## (undated) DEVICE — DRAPE,REIN 53X77,STERILE: Brand: MEDLINE

## (undated) DEVICE — SST TWIST DRILL, STANDARD, 2MM DIA. X 127MM: Brand: MICROAIRE®

## (undated) DEVICE — DRSNG SURESITE WNDW 4X4.5

## (undated) DEVICE — COATED BRAIDED POLYESTER: Brand: TI-CRON

## (undated) DEVICE — STRYKER PERFORMANCE SERIES SAGITTAL BLADE: Brand: STRYKER PERFORMANCE SERIES

## (undated) DEVICE — DRSNG SURG AQUACEL AG/ADVNTGE 9X25CM 3.5X10IN

## (undated) DEVICE — SUT VIC 0 SH 27IN J418H

## (undated) DEVICE — GOWN,NON-REINFORCED,SIRUS,SET IN SLV,XL: Brand: MEDLINE

## (undated) DEVICE — SUT MNCRYL PLS ANTIB UD 4/0 PS2 18IN

## (undated) DEVICE — DRN PENRS 1/4X18IN LTX

## (undated) DEVICE — ANTIBACTERIAL UNDYED BRAIDED (POLYGLACTIN 910), SYNTHETIC ABSORBABLE SUTURE: Brand: COATED VICRYL

## (undated) DEVICE — TRAP FLD MINIVAC MEGADYNE 100ML

## (undated) DEVICE — 3M™ IOBAN™ 2 ANTIMICROBIAL INCISE DRAPE 6650EZ: Brand: IOBAN™ 2

## (undated) DEVICE — PATIENT RETURN ELECTRODE, SINGLE-USE, CONTACT QUALITY MONITORING, ADULT, WITH 9FT CORD, FOR PATIENTS WEIGING OVER 33LBS. (15KG): Brand: MEGADYNE

## (undated) DEVICE — SUT MONOCRYL PLS ANTIB UND 3/0  PS1 27IN

## (undated) DEVICE — STCKNT IMPERV 12IN STRL

## (undated) DEVICE — PULLOVER TOGA, 2X LARGE: Brand: FLYTE, SURGICOOL